# Patient Record
Sex: FEMALE | Race: WHITE | Employment: UNEMPLOYED | URBAN - METROPOLITAN AREA
[De-identification: names, ages, dates, MRNs, and addresses within clinical notes are randomized per-mention and may not be internally consistent; named-entity substitution may affect disease eponyms.]

---

## 2017-01-18 RX ORDER — LORAZEPAM 1 MG/1
1 TABLET ORAL 2 TIMES DAILY
Qty: 30 TAB | Refills: 0 | Status: SHIPPED | OUTPATIENT
Start: 2017-01-18 | End: 2017-03-28 | Stop reason: SDUPTHER

## 2017-03-28 NOTE — TELEPHONE ENCOUNTER
From: Merna Kim  To:  Jessica Kaufman MD  Sent: 3/28/2017 3:23 PM EDT  Subject: Medication Renewal Request    Original authorizing provider: MD Merna Ferris would like a refill of the following medications:  LORazepam (ATIVAN) 1 mg tablet Jessica Kaufman MD]    Preferred pharmacy: [facility]ACY #[id number] - [address] AT [facility]    Comment:

## 2017-03-29 RX ORDER — LORAZEPAM 1 MG/1
1 TABLET ORAL 2 TIMES DAILY
Qty: 30 TAB | Refills: 0 | Status: SHIPPED | OUTPATIENT
Start: 2017-03-29 | End: 2017-06-13 | Stop reason: SDUPTHER

## 2017-06-14 NOTE — TELEPHONE ENCOUNTER
From: Fiona Rogers  To:  Rosette Dalal MD  Sent: 6/13/2017 7:13 PM EDT  Subject: Medication Renewal Request    Original authorizing provider: MD Fiona Mauricio would like a refill of the following medications:  LORazepam (ATIVAN) 1 mg tablet Rosette Dalal MD]    Preferred pharmacy: [address], [address]     Comment:

## 2017-06-15 RX ORDER — LORAZEPAM 1 MG/1
1 TABLET ORAL 2 TIMES DAILY
Qty: 30 TAB | Refills: 0 | Status: SHIPPED | OUTPATIENT
Start: 2017-06-15 | End: 2017-06-19 | Stop reason: SDUPTHER

## 2017-06-19 ENCOUNTER — HOSPITAL ENCOUNTER (OUTPATIENT)
Dept: MAMMOGRAPHY | Age: 42
Discharge: HOME | End: 2017-06-19
Attending: INTERNAL MEDICINE
Payer: COMMERCIAL

## 2017-06-19 ENCOUNTER — TELEPHONE (OUTPATIENT)
Dept: INTERNAL MEDICINE CLINIC | Facility: CLINIC | Age: 42
End: 2017-06-19

## 2017-06-19 DIAGNOSIS — Z12.31 ENCOUNTER FOR MAMMOGRAM TO ESTABLISH BASELINE MAMMOGRAM: ICD-10-CM

## 2017-06-19 PROCEDURE — 77067 SCR MAMMO BI INCL CAD: CPT

## 2017-06-19 RX ORDER — LORAZEPAM 1 MG/1
1 TABLET ORAL 2 TIMES DAILY
Qty: 30 TAB | Refills: 0 | Status: SHIPPED | OUTPATIENT
Start: 2017-06-19 | End: 2017-08-28 | Stop reason: SDUPTHER

## 2017-06-19 NOTE — TELEPHONE ENCOUNTER
Pt is here to  her Rx but Dr. Lila Thomason is not yet here to sign the previously approved Rx. Wrote new Rx.  Lattie Collet will find and shred the not yet signed copy from Dr. Lila Thomason.

## 2017-06-22 ENCOUNTER — HOSPITAL ENCOUNTER (OUTPATIENT)
Dept: MAMMOGRAPHY | Age: 42
Discharge: HOME | End: 2017-06-22
Attending: INTERNAL MEDICINE
Payer: COMMERCIAL

## 2017-06-22 ENCOUNTER — HOSPITAL ENCOUNTER (OUTPATIENT)
Dept: ULTRASOUND IMAGING | Age: 42
Discharge: HOME | End: 2017-06-22
Attending: INTERNAL MEDICINE
Payer: COMMERCIAL

## 2017-06-22 DIAGNOSIS — R92.8 ABNORMAL MAMMOGRAM OF LEFT BREAST: ICD-10-CM

## 2017-06-22 PROCEDURE — 77065 DX MAMMO INCL CAD UNI: CPT

## 2017-06-22 PROCEDURE — 76642 ULTRASOUND BREAST LIMITED: CPT

## 2017-08-29 NOTE — TELEPHONE ENCOUNTER
From: Kobi Schwartz  To: Joann Ma PA-C  Sent: 8/28/2017 10:23 PM EDT  Subject: Medication Renewal Request    Original authorizing provider: SHUN Díaz would like a refill of the following medications:  LORazepam (ATIVAN) 1 mg tablet Joann Ma PA-C]    Preferred pharmacy: [address]     Comment:

## 2017-08-30 RX ORDER — LORAZEPAM 1 MG/1
1 TABLET ORAL 2 TIMES DAILY
Qty: 30 TAB | Refills: 0 | Status: SHIPPED | OUTPATIENT
Start: 2017-08-30 | End: 2017-11-29 | Stop reason: SDUPTHER

## 2017-09-01 ENCOUNTER — OFFICE VISIT (OUTPATIENT)
Dept: INTERNAL MEDICINE CLINIC | Facility: CLINIC | Age: 42
End: 2017-09-01

## 2017-09-01 VITALS
RESPIRATION RATE: 18 BRPM | HEART RATE: 80 BPM | TEMPERATURE: 97 F | HEIGHT: 69 IN | WEIGHT: 188 LBS | BODY MASS INDEX: 27.85 KG/M2 | DIASTOLIC BLOOD PRESSURE: 96 MMHG | SYSTOLIC BLOOD PRESSURE: 126 MMHG

## 2017-09-01 DIAGNOSIS — F41.9 ANXIETY AND DEPRESSION: ICD-10-CM

## 2017-09-01 DIAGNOSIS — F32.A ANXIETY AND DEPRESSION: ICD-10-CM

## 2017-09-01 DIAGNOSIS — Z23 ENCOUNTER FOR IMMUNIZATION: ICD-10-CM

## 2017-09-01 DIAGNOSIS — R60.9 PERIPHERAL EDEMA: ICD-10-CM

## 2017-09-01 DIAGNOSIS — Z00.01 ENCOUNTER FOR GENERAL ADULT MEDICAL EXAMINATION WITH ABNORMAL FINDINGS: Primary | ICD-10-CM

## 2017-09-01 DIAGNOSIS — F43.10 PTSD (POST-TRAUMATIC STRESS DISORDER): ICD-10-CM

## 2017-09-01 DIAGNOSIS — Z87.898 HISTORY OF SYNCOPE: ICD-10-CM

## 2017-09-01 DIAGNOSIS — E55.9 VITAMIN D DEFICIENCY: ICD-10-CM

## 2017-09-01 RX ORDER — HYDROCHLOROTHIAZIDE 12.5 MG/1
12.5 TABLET ORAL DAILY
Qty: 30 TAB | Refills: 0 | Status: SHIPPED | OUTPATIENT
Start: 2017-09-01 | End: 2017-09-30 | Stop reason: SDUPTHER

## 2017-09-01 RX ORDER — SERTRALINE HYDROCHLORIDE 50 MG/1
50 TABLET, FILM COATED ORAL DAILY
Qty: 30 TAB | Refills: 2 | Status: SHIPPED | OUTPATIENT
Start: 2017-09-01 | End: 2017-09-18 | Stop reason: DRUGHIGH

## 2017-09-01 NOTE — PROGRESS NOTES
Subjective:      Lary Eddy is a 40 y.o. female who presents today for CPE. She has a history of polyps and IBS-D, she had a colonoscopy recently due to persistent diarrhea. IBS: she has done a food journal, dietary changes and elimination diets but statesit does not help. She believes her stress/anxiety may be contributing to her symptoms. She states she is frustrated because she has been told she has IBS but has not been given therapy to treat it. Anxiety/Depression: She states she is having trouble sleeping, trouble with concentration, trouble speaking, confusion, panic attacks. She has a history of PTSD. She states she gets brain zaps with buspirone    Syncope: this week she states she was having a panic attack and passed out, she did not fall because she was already sitting but came to slumped over in her chair. She felt the same sensation begin to happen today and went to see a nurse at her work, she had her BP checked and it was 130s/high 90s. She has not been diagnosed with hypertension but states she had postural hypotension as a adolescent, she was also diagnosed with MVP. She has had recent peripheral lower extremity edema to the point where her sock \"cut into\" her leg. She states she is chronically dehydrated. Health Maintenance  Immunizations:    Influenza: she will plan on getting it this fall. Tetanus: up to date. Cancer screening:    Cervical: complete hysterctomy, reviewed guidelines. Breast: she will be going back to get additional imaging in 6 months, reviewed guidelines    Patient Care Team:  Pao Cox MD as PCP - General (Internal Medicine)  Vicki Granado MD as Obstetrician (Obstetrics & Gynecology)  Shanna Merrill MD (Gynecology)       The following sections were reviewed & updated as appropriate: PMH, PSH, FH, and SH.     Patient Active Problem List   Diagnosis Code    Fibroids D25.9    Cellulitis of left upper extremity L03.114    Arthralgia M25.50    Positive MAURICIO (antinuclear antibody) R76.8    Hyperlipidemia E78.5    Vitamin D deficiency E55.9    Anxiety F41.9    DDD (degenerative disc disease), lumbar M51.36    Colitis K52.9    Mild intermittent asthma without complication W50.81    Family history of exposure to Agent Orange Z84.89          Prior to Admission medications    Medication Sig Start Date End Date Taking? Authorizing Provider   LORazepam (ATIVAN) 1 mg tablet Take 1 Tab by mouth two (2) times a day. Max Daily Amount: 2 mg. Prn anxiety 8/30/17  Yes Ar Domínguez MD      Allergies   Allergen Reactions    Latex Rash    Sulfa (Sulfonamide Antibiotics) Anaphylaxis    Adhesive Tape-Silicones Rash    Morphine Rash    Neosporin [Benz[place] Chloride] Rash      Family History   Problem Relation Age of Onset    Cancer Mother     Heart Disease Father     Depression Father     Cancer Sister     Alzheimer Maternal Grandmother     Diabetes Maternal Grandfather     Heart Surgery Paternal Grandmother     Heart Surgery Paternal Grandfather      Social History   Substance Use Topics    Smoking status: Former Smoker     Packs/day: 1.00     Years: 10.00     Quit date: 4/7/2004    Smokeless tobacco: Never Used    Alcohol use No        Review of Systems    A comprehensive review of systems was negative except for: Neurological: positive for syncope  Behvioral/Psych: positive for anxiety and depression     Objective:     Visit Vitals    BP (!) 126/96    Pulse 80    Temp 97 °F (36.1 °C) (Oral)    Resp 18    Ht 5' 9\" (1.753 m)    Wt 188 lb (85.3 kg)    LMP 03/18/2016    BMI 27.76 kg/m2     General:  Alert, cooperative, no distress, appears stated age. Head:  Normocephalic, without obvious abnormality, atraumatic. Eyes:  Conjunctivae/corneas clear. PERRL, EOMs intact. Ears:  Normal TMs and external ear canals both ears. Nose: Nares normal. Septum midline. Mucosa normal. No drainage or sinus tenderness.    Throat: Lips, mucosa, and tongue normal. Teeth and gums normal.   Neck: Supple, symmetrical, trachea midline, no adenopathy, thyroid: no enlargement/tenderness/nodules, no carotid bruit and no JVD. Back:   Symmetric, no curvature. ROM normal. No CVA tenderness. Lungs:   Clear to auscultation bilaterally. Chest wall:  No tenderness or deformity. Heart:  Regular rate and rhythm, S1, S2 normal, no murmur, click, rub or gallop. Abdomen:   Soft, non-tender. Bowel sounds normal. No masses,  No organomegaly. Extremities: Extremities normal, atraumatic, no cyanosis or edema. Pulses: 2+ and symmetric all extremities. Skin: Skin color, texture, turgor normal. No rashes or lesions. Lymph nodes: Cervical, supraclavicular, and axillary nodes normal.   Neurologic: CNII-XII intact. Normal strength, sensation and reflexes throughout. Nursing note and vitals reviewed  Assessment/Plan:       ICD-10-CM ICD-9-CM    1. Encounter for general adult medical examination with abnormal findings Z00.01 V70.0 CBC WITH AUTOMATED DIFF      LIPID PANEL      METABOLIC PANEL, COMPREHENSIVE      TSH 3RD GENERATION      VITAMIN D, 25 HYDROXY   2. Encounter for immunization Z23 V03.89 PNEUMOCOCCAL POLYSACCHARIDE VACCINE, 23-VALENT, ADULT OR IMMUNOSUPPRESSED PT DOSE,   3. History of syncope Z87.898 V15.89 AMB POC EKG ROUTINE W/ 12 LEADS, INTER & REP      REFERRAL TO NEUROLOGY      REFERRAL TO CARDIOLOGY   4. Anxiety and depression F41.9 300.00 sertraline (ZOLOFT) 50 mg tablet    F32.9 311    5. PTSD (post-traumatic stress disorder) F43.10 309.81 REFERRAL TO BEHAVIORAL HEALTH   6. Vitamin D deficiency E55.9 268.9    7. Peripheral edema R60.9 782.3 hydroCHLOROthiazide (HYDRODIURIL) 12.5 mg tablet     Concern for TIA, referral to neurology placed.  Blood pressure has been erratic and now with new onset peripheral edema, cardiology referral placed, EKG done today and is normal. Low dose hydrochlorothiazide started, she will check pressure over the weekend and discontinue if it is low. Info given about bentyl, she will review and let me know if she wants to trial it. Behavioral therapist referral placed also. Follow-up Disposition:  Return in about 4 weeks (around 9/29/2017) for Blood pressure check, Anxiety/Depression. Advised her to call back or return to office if symptoms worsen/change/persist.  Discussed expected course/resolution/complications of diagnosis in detail with patient. Medication risks/benefits/costs/interactions/alternatives discussed with patient. She was given an after visit summary which includes diagnoses, current medications, & vitals. She expressed understanding with the diagnosis and plan.

## 2017-09-01 NOTE — PATIENT INSTRUCTIONS
Dicyclomine (By mouth)   Dicyclomine (dye-SYE-kloe-meen)  Treats irritable bowel syndrome. Brand Name(s): Bentyl   There may be other brand names for this medicine. When This Medicine Should Not Be Used: You should not use this medicine if you have had an allergic reaction to dicyclomine. Do not use this medicine if you have glaucoma, myasthenia gravis, or trouble urinating because of a blockage (such as an enlarged prostate). Make sure your doctor knows about all digestion problems you have, including reflux esophagitis (GERD), or severe ulcerative colitis. You should not use this medicine if you are breast feeding. This medicine should not be given to infants less than 6 months old. How to Use This Medicine:   Capsule, Tablet, Long Acting Tablet, Liquid  · Take your medicine as directed. Your dose may need to be changed several times to find what works best for you. · Swallow the extended-release tablet whole. Do not break, crush or chew. · Measure the oral liquid medicine with a marked measuring spoon, oral syringe, or medicine cup. If a dose is missed:   · Take a dose as soon as you remember. If it is almost time for your next dose, wait until then and take a regular dose. Do not take extra medicine to make up for a missed dose. How to Store and Dispose of This Medicine:   · Store the medicine in a closed container at room temperature, away from heat, moisture, and direct light. Do not freeze the oral liquid. · Ask your pharmacist, doctor, or health caregiver about the best way to dispose of any outdated medicine or medicine no longer needed. · Keep all medicine out of the reach of children. Never share your medicine with anyone. Drugs and Foods to Avoid:   Ask your doctor or pharmacist before using any other medicine, including over-the-counter medicines, vitamins, and herbal products.   · Make sure your doctor knows if you are using amantadine (Symmetrel®), digoxin (Lanoxin®), or a belladonna medicine such as atropine, hyoscyamine, scopolamine, Anaspaz®, Arco-Lase® Plus, or Donnatal®. Tell your doctor if you are using an MAO inhibitor such as Eldepryl®, Marplan®, [place], or Parnate®. · Tell your doctor if you are also using narcotic pain medicine, medicine for heart rhythm problems, an antihistamine, medicine to treat depression, phenothiazines (medicines to treat certain mental problems or severe nausea or vomiting), or a steroid medicine. Meperidine (Demerol®) is a narcotic pain medicine. Some medicines for heart rhythm problems are disopyramide, procainamide, quinidine, Cardioquin®, Norpace®, Procanbid®, or Natalie Lyme. Diphenhydramine (Benadryl®) is an antihistamine. Some phenothiazine medicines are Compazine®, Mellaril®, Phenergan®, Serentil®, Tacaryl®, Thorazine®, or Trilafon®. Some medicines to treat depression are amitriptyline, nortriptyline, Norpramin®, or Vivactil®. Cortisone and prednisone are steroid medicines. · You should not use dicyclomine within 2 to 3 hours of taking antacids (Maalox®, Mylanta®) or medicine to stop diarrhea. Warnings While Using This Medicine:   · Make sure your doctor knows if you are pregnant, if you have liver disease, kidney disease, or overactive thyroid. Tell your doctor about any heart or blood vessel problems you have, including heart rhythm problems, congestive heart failure, or high blood pressure. Make sure your doctor knows if you have ongoing diarrhea, or an ileostomy or colostomy. Tell your doctor if you have autonomic neuropathy (a nerve problem), or a hiatal hernia (problems with your esophagus). · This medicine may make you sweat less. Your body could get too hot if you do not sweat enough. Stay out of hot places. Try to stay indoors or somewhere cool during hot weather. If you have a fever, call your doctor for advice. If your body gets too hot, you might feel dizzy, weak, tired, or confused. You might have an upset stomach or vomit.  Call your doctor if you are too hot and cannot cool down. · This medicine may make you drowsy. Avoid driving, using machines, or doing anything else that could be dangerous if you are not alert. · Your eyes may be more sensitive to bright light while you are using this medicine. You may want to wear sunglasses in bright sunlight. Possible Side Effects While Using This Medicine:   Call your doctor right away if you notice any of these side effects:  · Allergic reaction: Itching or hives, swelling in your face or hands, swelling or tingling in your mouth or throat, chest tightness, trouble breathing  · Fast or uneven heartbeat. · Restlessness, agitation, or confusion. · Severe dizziness or light-headedness. If you notice these less serious side effects, talk with your doctor:   · Decrease in how much or how often you urinate. · Drowsiness or weakness. · Dry mouth. · Nausea, vomiting, constipation, or stomach pain. · Trouble focusing or other vision changes. If you notice other side effects that you think are caused by this medicine, tell your doctor. Call your doctor for medical advice about side effects. You may report side effects to FDA at 4-832-FDA-9839  © 2017 [address] Information is for End User's use only and may not be sold, redistributed or otherwise used for commercial purposes. The above information is an  only. It is not intended as medical advice for individual conditions or treatments. Talk to your doctor, nurse or pharmacist before following any medical regimen to see if it is safe and effective for you. Dicyclomine (By mouth)   Dicyclomine (dye-SYE-kloe-meen)  Treats irritable bowel syndrome. Brand Name(s): Bentyl   There may be other brand names for this medicine. When This Medicine Should Not Be Used: You should not use this medicine if you have had an allergic reaction to dicyclomine.  Do not use this medicine if you have glaucoma, myasthenia gravis, or trouble urinating because of a blockage (such as an enlarged prostate). Make sure your doctor knows about all digestion problems you have, including reflux esophagitis (GERD), or severe ulcerative colitis. You should not use this medicine if you are breast feeding. This medicine should not be given to infants less than 6 months old. How to Use This Medicine:   Capsule, Tablet, Long Acting Tablet, Liquid  · Take your medicine as directed. Your dose may need to be changed several times to find what works best for you. · Swallow the extended-release tablet whole. Do not break, crush or chew. · Measure the oral liquid medicine with a marked measuring spoon, oral syringe, or medicine cup. If a dose is missed:   · Take a dose as soon as you remember. If it is almost time for your next dose, wait until then and take a regular dose. Do not take extra medicine to make up for a missed dose. How to Store and Dispose of This Medicine:   · Store the medicine in a closed container at room temperature, away from heat, moisture, and direct light. Do not freeze the oral liquid. · Ask your pharmacist, doctor, or health caregiver about the best way to dispose of any outdated medicine or medicine no longer needed. · Keep all medicine out of the reach of children. Never share your medicine with anyone. Drugs and Foods to Avoid:   Ask your doctor or pharmacist before using any other medicine, including over-the-counter medicines, vitamins, and herbal products. · Make sure your doctor knows if you are using amantadine (Symmetrel®), digoxin (Lanoxin®), or a belladonna medicine such as atropine, hyoscyamine, scopolamine, Anaspaz®, Arco-Lase® Plus, or Donnatal®. Tell your doctor if you are using an MAO inhibitor such as Eldepryl®, Marplan®, [place], or Parnate®.   · Tell your doctor if you are also using narcotic pain medicine, medicine for heart rhythm problems, an antihistamine, medicine to treat depression, phenothiazines (medicines to treat certain mental problems or severe nausea or vomiting), or a steroid medicine. Meperidine (Demerol®) is a narcotic pain medicine. Some medicines for heart rhythm problems are disopyramide, procainamide, quinidine, Cardioquin®, Norpace®, Procanbid®, or Starlet Lev. Diphenhydramine (Benadryl®) is an antihistamine. Some phenothiazine medicines are Compazine®, Mellaril®, Phenergan®, Serentil®, Tacaryl®, Thorazine®, or Trilafon®. Some medicines to treat depression are amitriptyline, nortriptyline, Norpramin®, or Vivactil®. Cortisone and prednisone are steroid medicines. · You should not use dicyclomine within 2 to 3 hours of taking antacids (Maalox®, Mylanta®) or medicine to stop diarrhea. Warnings While Using This Medicine:   · Make sure your doctor knows if you are pregnant, if you have liver disease, kidney disease, or overactive thyroid. Tell your doctor about any heart or blood vessel problems you have, including heart rhythm problems, congestive heart failure, or high blood pressure. Make sure your doctor knows if you have ongoing diarrhea, or an ileostomy or colostomy. Tell your doctor if you have autonomic neuropathy (a nerve problem), or a hiatal hernia (problems with your esophagus). · This medicine may make you sweat less. Your body could get too hot if you do not sweat enough. Stay out of hot places. Try to stay indoors or somewhere cool during hot weather. If you have a fever, call your doctor for advice. If your body gets too hot, you might feel dizzy, weak, tired, or confused. You might have an upset stomach or vomit. Call your doctor if you are too hot and cannot cool down. · This medicine may make you drowsy. Avoid driving, using machines, or doing anything else that could be dangerous if you are not alert. · Your eyes may be more sensitive to bright light while you are using this medicine. You may want to wear sunglasses in bright sunlight.   Possible Side Effects While Using This Medicine:   Call your doctor right away if you notice any of these side effects:  · Allergic reaction: Itching or hives, swelling in your face or hands, swelling or tingling in your mouth or throat, chest tightness, trouble breathing  · Fast or uneven heartbeat. · Restlessness, agitation, or confusion. · Severe dizziness or light-headedness. If you notice these less serious side effects, talk with your doctor:   · Decrease in how much or how often you urinate. · Drowsiness or weakness. · Dry mouth. · Nausea, vomiting, constipation, or stomach pain. · Trouble focusing or other vision changes. If you notice other side effects that you think are caused by this medicine, tell your doctor. Call your doctor for medical advice about side effects. You may report side effects to FDA at 9-115-FDA-0268  © 2017 [facility] Information is for End User's use only and may not be sold, redistributed or otherwise used for commercial purposes. The above information is an  only. It is not intended as medical advice for individual conditions or treatments. Talk to your doctor, nurse or pharmacist before following any medical regimen to see if it is safe and effective for you. Irritable Bowel Syndrome: Care Instructions  Your Care Instructions  Irritable bowel syndrome, or IBS, is a problem with the intestines that causes belly pain, bloating, gas, constipation, and diarrhea. The cause of IBS is not well known. IBS can last for many years, but it does not get worse over time or lead to serious disease. Most people can control their symptoms by changing their diet and reducing stress. Follow-up care is a key part of your treatment and safety. Be sure to make and go to all appointments, and call your doctor if you are having problems. It's also a good idea to know your test results and keep a list of the medicines you take. How can you care for yourself at home?   · For constipation:  ¨ Include fruits, vegetables, beans, and whole grains in your diet each day. These foods are high in fiber. ¨ Drink plenty of fluids, enough so that your urine is light yellow or clear like water. If you have kidney, heart, or liver disease and have to limit fluids, talk with your doctor before you increase the amount of fluids you drink. ¨ Get some exercise every day. Build up slowly to 30 to 60 minutes a day on 5 or more days of the week. ¨ Take a fiber supplement, such as Citrucel or Metamucil, every day if needed. Read and follow all instructions on the label. ¨ Schedule time each day for a bowel movement. Having a daily routine may help. Take your time and do not strain when having a bowel movement. · If you often have diarrhea, limit foods and drinks that make it worse. These are different for each person but may include caffeine (found in coffee, tea, chocolate, and cola drinks), alcohol, fatty foods, gas-producing foods (such as beans, cabbage, and broccoli), some dairy products, and spicy foods. Do not eat candy or gum that contains sorbitol. · Keep a daily diary of what you eat and what symptoms you have. This may help find foods that cause you problems. · Eat slowly. Try to make mealtime relaxing. · Find ways to reduce stress. · Get at least 30 minutes of exercise on most days of the week. Exercise can help reduce tension and prevent constipation. Walking is a good choice. You also may want to do other activities, such as running, swimming, cycling, or playing tennis or team sports. When should you call for help? Call your doctor now or seek immediate medical care if:  · Your pain is different than usual or occurs with fever. · You lose weight without trying, or you lose your appetite and you do not know why. · Your symptoms often wake you from sleep. · Your stools are black and tarlike or have streaks of blood.   Watch closely for changes in your health, and be sure to contact your doctor if:  · Your IBS symptoms get worse or begin to disrupt your day-to-day life. · You become more tired than usual.  · Your home treatment stops working. Where can you learn more? Go to http://demarco-max.info/. Enter S435 in the search box to learn more about \"Irritable Bowel Syndrome: Care Instructions. \"  Current as of: August 9, 2016  Content Version: 11.3  © 8691-9102 [employer]. Care instructions adapted under license by [employer] (which disclaims liability or warranty for this information). If you have questions about a medical condition or this instruction, always ask your healthcare professional. [place] [address] any warranty or liability for your use of this information. Hydrochlorothiazide (By mouth)   Hydrochlorothiazide (genesis-droe-klor-hb-KJTM-g-zide)  Treats high blood pressure and fluid retention (edema). This medicine is a diuretic (water pill). Brand Name(s): Microzide   There may be other brand names for this medicine. When This Medicine Should Not Be Used: This medicine is not right for everyone. Do not use this medicine if you had an allergic reaction to hydrochlorothiazide or a sulfa drug. How to Use This Medicine:   Capsule, Liquid, Tablet  · Take your medicine as directed. Your dose may need to be changed several times to find what works best for you. · Measure the oral liquid medicine with a marked measuring spoon, oral syringe, or medicine cup. · Missed dose: Take a dose as soon as you remember. If it is almost time for your next dose, wait until then and take a regular dose. Do not take extra medicine to make up for a missed dose. · Store the medicine in a closed container at room temperature, away from heat, moisture, and direct light. Drugs and Foods to Avoid:   Ask your doctor or pharmacist before using any other medicine, including over-the-counter medicines, vitamins, and herbal products.   · Some medicines and foods can affect how hydrochlorothiazide works. Tell your doctor if you are also using any of the following:   ¨ Cholestyramine, colestipol, digoxin, lithium, insulin or other diabetes medicine  ¨ An NSAID pain or arthritis medicine (such as aspirin, diclofenac, ibuprofen, naproxen, celecoxib), or a steroid medicine (such as hydrocortisone, methylprednisolone, prednisone, prednisolone, dexamethasone)  Warnings While Using This Medicine:   · Tell your doctor if you are pregnant or breastfeeding, or if you have kidney disease, liver disease, heart disease or heart failure, high cholesterol, diabetes, gout, trouble urinating, or lupus. · This medicine may cause the following problems:  ¨ Glaucoma and other vision problems  ¨ Acute gout  ¨ Damage to the parathyroid gland  ¨ Low or high levels of minerals in your blood (including potassium and sodium)  · This medicine may make you dizzy. Do not drive or do anything else that could be dangerous until you know how this medicine affects you. · This medicine could lower your blood pressure too much, especially when you first use it or if you are dehydrated. Stand or sit up slowly if you feel lightheaded or dizzy. Alcohol may make this problem worse. · Tell any doctor or dentist who treats you that you are using this medicine. · Your doctor will check your progress and the effects of this medicine at regular visits. Keep all appointments. · Keep all medicine out of the reach of children. Never share your medicine with anyone.   Possible Side Effects While Using This Medicine:   Call your doctor right away if you notice any of these side effects:  · Allergic reaction: Itching or hives, swelling in your face or hands, swelling or tingling in your mouth or throat, chest tightness, trouble breathing  · Blistering, peeling, or red skin rash  · Confusion, weakness, and muscle twitching  · Dry mouth, increased thirst, muscle cramps, nausea or vomiting, uneven heartbeat  · Lightheadedness, dizziness, or fainting  · Nausea, vomiting, unusual tiredness or weakness, muscle cramps, confusion  · Trouble seeing, eye pain, blurred vision or other vision changes  If you notice these less serious side effects, talk with your doctor:   · Headache  · Mild diarrhea, constipation, nausea  If you notice other side effects that you think are caused by this medicine, tell your doctor. Call your doctor for medical advice about side effects. You may report side effects to FDA at 9-793-ZIN-2026  © 2017 [address] Information is for End User's use only and may not be sold, redistributed or otherwise used for commercial purposes. The above information is an  only. It is not intended as medical advice for individual conditions or treatments. Talk to your doctor, nurse or pharmacist before following any medical regimen to see if it is safe and effective for you. Elevated Blood Pressure: Care Instructions  Your Care Instructions    Blood pressure is a measure of how hard the blood pushes against the walls of your arteries. It's normal for blood pressure to go up and down throughout the day. But if it stays up over time, you have high blood pressure. Two numbers tell you your blood pressure. The first number is the systolic pressure. It shows how hard the blood pushes when your heart is pumping. The second number is the diastolic pressure. It shows how hard the blood pushes between heartbeats, when your heart is relaxed and filling with blood. An ideal blood pressure in adults is less than 120/80 (say \"120 over 80\"). High blood pressure is 140/90 or higher. You have high blood pressure if your top number is 140 or higher or your bottom number is 90 or higher, or both. The main test for high blood pressure is simple, fast, and painless. To diagnose high blood pressure, your doctor will test your blood pressure at different times.  After testing your blood pressure, your doctor may ask you to test it again when you are home. If you are diagnosed with high blood pressure, you can work with your doctor to make a long-term plan to manage it. Follow-up care is a key part of your treatment and safety. Be sure to make and go to all appointments, and call your doctor if you are having problems. It's also a good idea to know your test results and keep a list of the medicines you take. How can you care for yourself at home? · Do not smoke. Smoking increases your risk for heart attack and stroke. If you need help quitting, talk to your doctor about stop-smoking programs and medicines. These can increase your chances of quitting for good. · Stay at a healthy weight. · Try to limit how much sodium you eat to less than 2,300 milligrams (mg) a day. Your doctor may ask you to try to eat less than 1,500 mg a day. · Be physically active. Get at least 30 minutes of exercise on most days of the week. Walking is a good choice. You also may want to do other activities, such as running, swimming, cycling, or playing tennis or team sports. · Avoid or limit alcohol. Talk to your doctor about whether you can drink any alcohol. · Eat plenty of fruits, vegetables, and low-fat dairy products. Eat less saturated and total fats. · Learn how to check your blood pressure at home. When should you call for help? Call your doctor now or seek immediate medical care if:  · Your blood pressure is much higher than normal (such as 180/110 or higher). · You think high blood pressure is causing symptoms such as:  ¨ Severe headache. ¨ Blurry vision. Watch closely for changes in your health, and be sure to contact your doctor if:  · You do not get better as expected. Where can you learn more? Go to http://demarco-max.info/. Enter R870 in the search box to learn more about \"Elevated Blood Pressure: Care Instructions. \"  Current as of: April 3, 2017  Content Version: 11.3  © 3813-3241 [employer]. Care instructions adapted under license by [employer] (which disclaims liability or warranty for this information). If you have questions about a medical condition or this instruction, always ask your healthcare professional. [place] [address] any warranty or liability for your use of this information.

## 2017-09-01 NOTE — PROGRESS NOTES
Chief Complaint   Patient presents with    Physical    Medication Evaluation     Ativan       1. Have you been to the ER, urgent care clinic since your last visit? Hospitalized since your last visit? Yes, [facility] 2 weeks ago for back pain    2. Have you seen or consulted any other health care providers outside of the [address] [place] [address] since your last visit? Include any pap smears or colon screening. Sandrine Hinton  is a 40 y.o.  female  who present for Pneumovax 23 immunizations/injections. He/she denies any symptoms , reactions or allergies that would exclude them from being immunized today. Risks and adverse reactions were discussed and the VIS was given if applicable to them. All questions were addressed. He/She was observed for 5 min post injection. There were no reactions observed.     Michelle Cuenca LPN

## 2017-09-01 NOTE — MR AVS SNAPSHOT
Visit Information Date & Time Provider Department Dept. Phone Encounter #  
 9/1/2017  3:30 PM Teodoro Thayer, [address] Internal Medicine [phone] [id number] Upcoming Health Maintenance Date Due Pneumococcal 19-64 Medium Risk (1 of 1 - PPSV23) 5/18/1995 INFLUENZA AGE 9 TO ADULT 8/1/2017 PAP AKA CERVICAL CYTOLOGY 2/22/2019 DTaP/Tdap/Td series (2 - Td) 2/22/2026 Allergies as of 9/1/2017  Review Complete On: 9/1/2017 By: Teodoro Thayer NP Severity Noted Reaction Type Reactions Latex  02/03/2016    Rash  
 Sulfa (Sulfonamide Antibiotics) High 02/03/2016    Anaphylaxis Adhesive Tape-silicones  19/10/3341    Rash Morphine  02/03/2016    Rash Neosporin [Benzalkonium Chloride]  08/18/2016    Rash Current Immunizations  Reviewed on 2/23/2016 Name Date Influenza Vaccine (Quad) PF 2/3/2016 Pneumococcal Polysaccharide (PPSV-23)  Incomplete Tdap 2/22/2016 Not reviewed this visit You Were Diagnosed With   
  
 Codes Comments Encounter for general adult medical examination with abnormal findings    -  Primary ICD-10-CM: Z00.01 
ICD-9-CM: V70.0 Encounter for immunization     ICD-10-CM: F02 ICD-9-CM: V03.89 History of syncope     ICD-10-CM: Z87.898 ICD-9-CM: V15.89 Anxiety and depression     ICD-10-CM: F41.9, F32.9 ICD-9-CM: 300.00, 311 PTSD (post-traumatic stress disorder)     ICD-10-CM: F43.10 ICD-9-CM: 309.81 Vitamin D deficiency     ICD-10-CM: E55.9 ICD-9-CM: 268.9 Peripheral edema     ICD-10-CM: R60.9 ICD-9-CM: 026. 3 Vitals BP Pulse Temp Resp Height(growth percentile) Weight(growth percentile) (!) 126/96 80 97 °F (36.1 °C) (Oral) 18 5' 9\" (1.753 m) 188 lb (85.3 kg) LMP BMI OB Status Smoking Status 03/18/2016 27.76 kg/m2 Hysterectomy Former Smoker Vitals History BMI and BSA Data  Body Mass Index Body Surface Area  
 27.76 kg/m 2 2.04 m 2  
  
  
 Preferred Pharmacy Pharmacy Name Phone [facility]/PHARMACY #[id number] AT [address] [place] [address] [phone] Your Updated Medication List  
  
   
This list is accurate as of: 9/1/17  4:26 PM.  Always use your most recent med list.  
  
  
  
  
 hydroCHLOROthiazide 12.5 mg tablet Commonly known as:  HYDRODIURIL Take 1 Tab by mouth daily. LORazepam 1 mg tablet Commonly known as:  ATIVAN Take 1 Tab by mouth two (2) times a day. Max Daily Amount: 2 mg. Prn anxiety  
  
 sertraline 50 mg tablet Commonly known as:  ZOLOFT Take 1 Tab by mouth daily. Prescriptions Sent to Pharmacy Refills  
 sertraline (ZOLOFT) 50 mg tablet 2 Sig: Take 1 Tab by mouth daily. Class: Normal  
 Pharmacy: [facility]/pharmacy #[id number] AT [address] [place] [address] Ph #: [phone] Route: Oral  
 hydroCHLOROthiazide (HYDRODIURIL) 12.5 mg tablet 0 Sig: Take 1 Tab by mouth daily. Class: Normal  
 Pharmacy: [facility]/pharmacy #[id number] AT [address] [place] [address] Ph #: [phone] Route: Oral  
  
We Performed the Following AMB POC EKG ROUTINE W/ 12 LEADS, INTER & REP [50403 CPT(R)] CBC WITH AUTOMATED DIFF [45235 CPT(R)] LIPID PANEL [62422 CPT(R)] METABOLIC PANEL, COMPREHENSIVE [45717 CPT(R)] PNEUMOCOCCAL POLYSACCHARIDE VACCINE, 23-VALENT, ADULT OR IMMUNOSUPPRESSED PT DOSE, [98065 CPT(R)] REFERRAL TO BEHAVIORAL HEALTH [REF8 Custom] REFERRAL TO CARDIOLOGY [BZM34 Custom] REFERRAL TO NEUROLOGY [KEW62 Custom] TSH 3RD GENERATION [70543 CPT(R)] VITAMIN D, 25 HYDROXY [id number] CPT(R)] To-Do List   
 12/19/2017 8:30 AM  
  Appointment with Pending sale to [facility] at [facility] ([phone]) Shower or bathe using soap and water.   Do not use deodorant, powder, perfumes, or lotion the day of your exam.  If your prior mammograms were not performed at [facility] 6 please bring films with you or forward prior images 2 days before your procedure. If patient is not a callback diagnostic, the patient must have an order/script from the physician for the diagnostic. Please bring it on the day of the mammogram or have it faxed to the department. [facility]  [phone] [facility] [phone] SANDR[facility]  [phone] [facility] [phone] [facility] [phone] [facility] [phone] Chele Alegria [phone]  
  
 12/19/2017 9:00 AM  
  Appointment with [address] [place] [address] at [facility] ([phone]) Shower or bathe using soap and water. Do not use deodorant, powder, perfumes, or lotion. If your prior films were not performed at a local [facility] facility please bring or forward prior images 2 days before procedure. Referral Information Referral ID Referred By Referred To  
  
 [id number] SKIFF, Jack Quail, [facility] [address] [facility], [address] Phone: [phone] Fax: [phone] Visits Status Start Date End Date 1 New Request 9/1/17 9/1/18 If your referral has a status of pending review or denied, additional information will be sent to support the outcome of this decision. Referral ID Referred By Referred To  
 [id number] SKIFF, [address], DO  
   [address] [facility] [address] [facility] Neurology [facility], [address] Phone: [phone] Fax: [phone] Visits Status Start Date End Date 1 New Request 9/1/17 9/1/18 If your referral has a status of pending review or denied, additional information will be sent to support the outcome of this decision. Referral ID Referred By Referred To  
 [id number] SKIFF, Patrecia Settles[address] [facility] [address], [address] Phone: [phone] Fax: [phone] Visits Status Start Date End Date 1 New Request 9/1/17 9/1/18 If your referral has a status of pending review or denied, additional information will be sent to support the outcome of this decision. Patient Instructions Dicyclomine (By mouth) Dicyclomine (dye-SYE-kloe-meen) Treats irritable bowel syndrome. Brand Name(s): Bentyl There may be other brand names for this medicine. When This Medicine Should Not Be Used: You should not use this medicine if you have had an allergic reaction to dicyclomine. Do not use this medicine if you have glaucoma, myasthenia gravis, or trouble urinating because of a blockage (such as an enlarged prostate). Make sure your doctor knows about all digestion problems you have, including reflux esophagitis (GERD), or severe ulcerative colitis. You should not use this medicine if you are breast feeding. This medicine should not be given to infants less than 6 months old. How to Use This Medicine:  
Capsule, Tablet, Long Acting Tablet, Liquid · Take your medicine as directed. Your dose may need to be changed several times to find what works best for you. · Swallow the extended-release tablet whole. Do not break, crush or chew. · Measure the oral liquid medicine with a marked measuring spoon, oral syringe, or medicine cup. If a dose is missed: · Take a dose as soon as you remember. If it is almost time for your next dose, wait until then and take a regular dose. Do not take extra medicine to make up for a missed dose. How to Store and Dispose of This Medicine: · Store the medicine in a closed container at room temperature, away from heat, moisture, and direct light. Do not freeze the oral liquid. · Ask your pharmacist, doctor, or health caregiver about the best way to dispose of any outdated medicine or medicine no longer needed. · Keep all medicine out of the reach of children. Never share your medicine with anyone. Drugs and Foods to Avoid: Ask your doctor or pharmacist before using any other medicine, including over-the-counter medicines, vitamins, and herbal products. · Make sure your doctor knows if you are using amantadine (Symmetrel®), digoxin (Lanoxin®), or a belladonna medicine such as atropine, hyoscyamine, scopolamine, Anaspaz®, Arco-Lase® Plus, or Donnatal®. Tell your doctor if you are using an MAO inhibitor such as Eldepryl®, Marplan®, [place], or Parnate®. · Tell your doctor if you are also using narcotic pain medicine, medicine for heart rhythm problems, an antihistamine, medicine to treat depression, phenothiazines (medicines to treat certain mental problems or severe nausea or vomiting), or a steroid medicine. Meperidine (Demerol®) is a narcotic pain medicine. Some medicines for heart rhythm problems are disopyramide, procainamide, quinidine, Cardioquin®, Norpace®, Procanbid®, or Carlos Weller. Diphenhydramine (Benadryl®) is an antihistamine. Some phenothiazine medicines are Compazine®, Mellaril®, Phenergan®, Serentil®, Tacaryl®, Thorazine®, or Trilafon®. Some medicines to treat depression are amitriptyline, nortriptyline, Norpramin®, or Vivactil®. Cortisone and prednisone are steroid medicines. · You should not use dicyclomine within 2 to 3 hours of taking antacids (Maalox®, Mylanta®) or medicine to stop diarrhea. Warnings While Using This Medicine: · Make sure your doctor knows if you are pregnant, if you have liver disease, kidney disease, or overactive thyroid. Tell your doctor about any heart or blood vessel problems you have, including heart rhythm problems, congestive heart failure, or high blood pressure. Make sure your doctor knows if you have ongoing diarrhea, or an ileostomy or colostomy. Tell your doctor if you have autonomic neuropathy (a nerve problem), or a hiatal hernia (problems with your esophagus). · This medicine may make you sweat less. Your body could get too hot if you do not sweat enough. Stay out of hot places.  Try to stay indoors or somewhere cool during hot weather. If you have a fever, call your doctor for advice. If your body gets too hot, you might feel dizzy, weak, tired, or confused. You might have an upset stomach or vomit. Call your doctor if you are too hot and cannot cool down. · This medicine may make you drowsy. Avoid driving, using machines, or doing anything else that could be dangerous if you are not alert. · Your eyes may be more sensitive to bright light while you are using this medicine. You may want to wear sunglasses in bright sunlight. Possible Side Effects While Using This Medicine:  
Call your doctor right away if you notice any of these side effects: · Allergic reaction: Itching or hives, swelling in your face or hands, swelling or tingling in your mouth or throat, chest tightness, trouble breathing · Fast or uneven heartbeat. · Restlessness, agitation, or confusion. · Severe dizziness or light-headedness. If you notice these less serious side effects, talk with your doctor: · Decrease in how much or how often you urinate. · Drowsiness or weakness. · Dry mouth. · Nausea, vomiting, constipation, or stomach pain. · Trouble focusing or other vision changes. If you notice other side effects that you think are caused by this medicine, tell your doctor. Call your doctor for medical advice about side effects. You may report side effects to FDA at 5-596-FDA-0128 © 2017 [facility] Information is for End User's use only and may not be sold, redistributed or otherwise used for commercial purposes. The above information is an  only. It is not intended as medical advice for individual conditions or treatments. Talk to your doctor, nurse or pharmacist before following any medical regimen to see if it is safe and effective for you. Dicyclomine (By mouth) Dicyclomine (dye-SYE-kloe-meen) Treats irritable bowel syndrome. Brand Name(s): Bentyl There may be other brand names for this medicine. When This Medicine Should Not Be Used: You should not use this medicine if you have had an allergic reaction to dicyclomine. Do not use this medicine if you have glaucoma, myasthenia gravis, or trouble urinating because of a blockage (such as an enlarged prostate). Make sure your doctor knows about all digestion problems you have, including reflux esophagitis (GERD), or severe ulcerative colitis. You should not use this medicine if you are breast feeding. This medicine should not be given to infants less than 6 months old. How to Use This Medicine:  
Capsule, Tablet, Long Acting Tablet, Liquid · Take your medicine as directed. Your dose may need to be changed several times to find what works best for you. · Swallow the extended-release tablet whole. Do not break, crush or chew. · Measure the oral liquid medicine with a marked measuring spoon, oral syringe, or medicine cup. If a dose is missed: · Take a dose as soon as you remember. If it is almost time for your next dose, wait until then and take a regular dose. Do not take extra medicine to make up for a missed dose. How to Store and Dispose of This Medicine: · Store the medicine in a closed container at room temperature, away from heat, moisture, and direct light. Do not freeze the oral liquid. · Ask your pharmacist, doctor, or health caregiver about the best way to dispose of any outdated medicine or medicine no longer needed. · Keep all medicine out of the reach of children. Never share your medicine with anyone. Drugs and Foods to Avoid: Ask your doctor or pharmacist before using any other medicine, including over-the-counter medicines, vitamins, and herbal products. · Make sure your doctor knows if you are using amantadine (Symmetrel®), digoxin (Lanoxin®), or a belladonna medicine such as atropine, hyoscyamine, scopolamine, Anaspaz®, Arco-Lase® Plus, or Donnatal®.  Tell your doctor if you are using an MAO inhibitor such as Eldepryl®, Marplan®, [place], or Parnate®. · Tell your doctor if you are also using narcotic pain medicine, medicine for heart rhythm problems, an antihistamine, medicine to treat depression, phenothiazines (medicines to treat certain mental problems or severe nausea or vomiting), or a steroid medicine. Meperidine (Demerol®) is a narcotic pain medicine. Some medicines for heart rhythm problems are disopyramide, procainamide, quinidine, Cardioquin®, Norpace®, Procanbid®, or Benjy Coon. Diphenhydramine (Benadryl®) is an antihistamine. Some phenothiazine medicines are Compazine®, Mellaril®, Phenergan®, Serentil®, Tacaryl®, Thorazine®, or Trilafon®. Some medicines to treat depression are amitriptyline, nortriptyline, Norpramin®, or Vivactil®. Cortisone and prednisone are steroid medicines. · You should not use dicyclomine within 2 to 3 hours of taking antacids (Maalox®, Mylanta®) or medicine to stop diarrhea. Warnings While Using This Medicine: · Make sure your doctor knows if you are pregnant, if you have liver disease, kidney disease, or overactive thyroid. Tell your doctor about any heart or blood vessel problems you have, including heart rhythm problems, congestive heart failure, or high blood pressure. Make sure your doctor knows if you have ongoing diarrhea, or an ileostomy or colostomy. Tell your doctor if you have autonomic neuropathy (a nerve problem), or a hiatal hernia (problems with your esophagus). · This medicine may make you sweat less. Your body could get too hot if you do not sweat enough. Stay out of hot places. Try to stay indoors or somewhere cool during hot weather. If you have a fever, call your doctor for advice. If your body gets too hot, you might feel dizzy, weak, tired, or confused. You might have an upset stomach or vomit. Call your doctor if you are too hot and cannot cool down. · This medicine may make you drowsy. Avoid driving, using machines, or doing anything else that could be dangerous if you are not alert. · Your eyes may be more sensitive to bright light while you are using this medicine. You may want to wear sunglasses in bright sunlight. Possible Side Effects While Using This Medicine:  
Call your doctor right away if you notice any of these side effects: · Allergic reaction: Itching or hives, swelling in your face or hands, swelling or tingling in your mouth or throat, chest tightness, trouble breathing · Fast or uneven heartbeat. · Restlessness, agitation, or confusion. · Severe dizziness or light-headedness. If you notice these less serious side effects, talk with your doctor: · Decrease in how much or how often you urinate. · Drowsiness or weakness. · Dry mouth. · Nausea, vomiting, constipation, or stomach pain. · Trouble focusing or other vision changes. If you notice other side effects that you think are caused by this medicine, tell your doctor. Call your doctor for medical advice about side effects. You may report side effects to FDA at 6-890-FDA-1849 © 2017 [facility] Information is for End User's use only and may not be sold, redistributed or otherwise used for commercial purposes. The above information is an  only. It is not intended as medical advice for individual conditions or treatments. Talk to your doctor, nurse or pharmacist before following any medical regimen to see if it is safe and effective for you. Irritable Bowel Syndrome: Care Instructions Your Care Instructions Irritable bowel syndrome, or IBS, is a problem with the intestines that causes belly pain, bloating, gas, constipation, and diarrhea. The cause of IBS is not well known. IBS can last for many years, but it does not get worse over time or lead to serious disease.  
Most people can control their symptoms by changing their diet and reducing stress. Follow-up care is a key part of your treatment and safety. Be sure to make and go to all appointments, and call your doctor if you are having problems. It's also a good idea to know your test results and keep a list of the medicines you take. How can you care for yourself at home? · For constipation: 
¨ Include fruits, vegetables, beans, and whole grains in your diet each day. These foods are high in fiber. ¨ Drink plenty of fluids, enough so that your urine is light yellow or clear like water. If you have kidney, heart, or liver disease and have to limit fluids, talk with your doctor before you increase the amount of fluids you drink. ¨ Get some exercise every day. Build up slowly to 30 to 60 minutes a day on 5 or more days of the week. ¨ Take a fiber supplement, such as Citrucel or Metamucil, every day if needed. Read and follow all instructions on the label. ¨ Schedule time each day for a bowel movement. Having a daily routine may help. Take your time and do not strain when having a bowel movement. · If you often have diarrhea, limit foods and drinks that make it worse. These are different for each person but may include caffeine (found in coffee, tea, chocolate, and cola drinks), alcohol, fatty foods, gas-producing foods (such as beans, cabbage, and broccoli), some dairy products, and spicy foods. Do not eat candy or gum that contains sorbitol. · Keep a daily diary of what you eat and what symptoms you have. This may help find foods that cause you problems. · Eat slowly. Try to make mealtime relaxing. · Find ways to reduce stress. · Get at least 30 minutes of exercise on most days of the week. Exercise can help reduce tension and prevent constipation. Walking is a good choice. You also may want to do other activities, such as running, swimming, cycling, or playing tennis or team sports. When should you call for help? Call your doctor now or seek immediate medical care if: · Your pain is different than usual or occurs with fever. · You lose weight without trying, or you lose your appetite and you do not know why. · Your symptoms often wake you from sleep. · Your stools are black and tarlike or have streaks of blood. Watch closely for changes in your health, and be sure to contact your doctor if: 
· Your IBS symptoms get worse or begin to disrupt your day-to-day life. · You become more tired than usual. 
· Your home treatment stops working. Where can you learn more? Go to http://demarco-max.info/. Enter O992 in the search box to learn more about \"Irritable Bowel Syndrome: Care Instructions. \" Current as of: August 9, 2016 Content Version: 11.3 © 5751-0032 [employer]. Care instructions adapted under license by [employer] (which disclaims liability or warranty for this information). If you have questions about a medical condition or this instruction, always ask your healthcare professional. [place] [address] any warranty or liability for your use of this information. Hydrochlorothiazide (By mouth) Hydrochlorothiazide (genesis-droe-klor-kn-HZEU-i-zide) Treats high blood pressure and fluid retention (edema). This medicine is a diuretic (water pill). Brand Name(s): Microzide There may be other brand names for this medicine. When This Medicine Should Not Be Used: This medicine is not right for everyone. Do not use this medicine if you had an allergic reaction to hydrochlorothiazide or a sulfa drug. How to Use This Medicine:  
Capsule, Liquid, Tablet · Take your medicine as directed. Your dose may need to be changed several times to find what works best for you. · Measure the oral liquid medicine with a marked measuring spoon, oral syringe, or medicine cup. · Missed dose: Take a dose as soon as you remember. If it is almost time for your next dose, wait until then and take a regular dose.  Do not take extra medicine to make up for a missed dose. · Store the medicine in a closed container at room temperature, away from heat, moisture, and direct light. Drugs and Foods to Avoid: Ask your doctor or pharmacist before using any other medicine, including over-the-counter medicines, vitamins, and herbal products. · Some medicines and foods can affect how hydrochlorothiazide works. Tell your doctor if you are also using any of the following: ¨ Cholestyramine, colestipol, digoxin, lithium, insulin or other diabetes medicine ¨ An NSAID pain or arthritis medicine (such as aspirin, diclofenac, ibuprofen, naproxen, celecoxib), or a steroid medicine (such as hydrocortisone, methylprednisolone, prednisone, prednisolone, dexamethasone) Warnings While Using This Medicine: · Tell your doctor if you are pregnant or breastfeeding, or if you have kidney disease, liver disease, heart disease or heart failure, high cholesterol, diabetes, gout, trouble urinating, or lupus. · This medicine may cause the following problems: ¨ Glaucoma and other vision problems ¨ Acute gout ¨ Damage to the parathyroid gland ¨ Low or high levels of minerals in your blood (including potassium and sodium) · This medicine may make you dizzy. Do not drive or do anything else that could be dangerous until you know how this medicine affects you. · This medicine could lower your blood pressure too much, especially when you first use it or if you are dehydrated. Stand or sit up slowly if you feel lightheaded or dizzy. Alcohol may make this problem worse. · Tell any doctor or dentist who treats you that you are using this medicine. · Your doctor will check your progress and the effects of this medicine at regular visits. Keep all appointments. · Keep all medicine out of the reach of children. Never share your medicine with anyone. Possible Side Effects While Using This Medicine: Call your doctor right away if you notice any of these side effects: · Allergic reaction: Itching or hives, swelling in your face or hands, swelling or tingling in your mouth or throat, chest tightness, trouble breathing · Blistering, peeling, or red skin rash · Confusion, weakness, and muscle twitching · Dry mouth, increased thirst, muscle cramps, nausea or vomiting, uneven heartbeat · Lightheadedness, dizziness, or fainting · Nausea, vomiting, unusual tiredness or weakness, muscle cramps, confusion · Trouble seeing, eye pain, blurred vision or other vision changes If you notice these less serious side effects, talk with your doctor:  
· Headache · Mild diarrhea, constipation, nausea If you notice other side effects that you think are caused by this medicine, tell your doctor. Call your doctor for medical advice about side effects. You may report side effects to FDA at 3-392-FDA-1367 © 2017 [address] Information is for End User's use only and may not be sold, redistributed or otherwise used for commercial purposes. The above information is an  only. It is not intended as medical advice for individual conditions or treatments. Talk to your doctor, nurse or pharmacist before following any medical regimen to see if it is safe and effective for you. Introducing [facility]! Dear Ernie Lovelace: 
Thank you for requesting a [employer] account. Our records indicate that you already have an active [employer] account. You can access your account anytime at [email or web address] Did you know that you can access your hospital and ER discharge instructions at any time in [employer]? You can also review all of your test results from your hospital stay or ER visit. Additional Information If you have questions, please visit the Frequently Asked Questions section of the [employer] website at [email or web address] Remember, MyChart is NOT to be used for urgent needs. For medical emergencies, dial 911. Now available from your iPhone and Android! Please provide this summary of care documentation to your next provider. Your primary care clinician is listed as Bartolome French. If you have any questions after today's visit, please call [phone].

## 2017-09-02 LAB
25(OH)D3+25(OH)D2 SERPL-MCNC: 23.6 NG/ML (ref 30–100)
ALBUMIN SERPL-MCNC: 4.8 G/DL (ref 3.5–5.5)
ALBUMIN/GLOB SERPL: 2.1 {RATIO} (ref 1.2–2.2)
ALP SERPL-CCNC: 70 IU/L (ref 39–117)
ALT SERPL-CCNC: 79 IU/L (ref 0–32)
AST SERPL-CCNC: 39 IU/L (ref 0–40)
BASOPHILS # BLD AUTO: 0.1 X10E3/UL (ref 0–0.2)
BASOPHILS NFR BLD AUTO: 1 %
BILIRUB SERPL-MCNC: 1.4 MG/DL (ref 0–1.2)
BUN SERPL-MCNC: 10 MG/DL (ref 6–24)
BUN/CREAT SERPL: 14 (ref 9–23)
CALCIUM SERPL-MCNC: 9.7 MG/DL (ref 8.7–10.2)
CHLORIDE SERPL-SCNC: 103 MMOL/L (ref 96–106)
CHOLEST SERPL-MCNC: 203 MG/DL (ref 100–199)
CO2 SERPL-SCNC: 22 MMOL/L (ref 18–29)
CREAT SERPL-MCNC: 0.73 MG/DL (ref 0.57–1)
EOSINOPHIL # BLD AUTO: 0.2 X10E3/UL (ref 0–0.4)
EOSINOPHIL NFR BLD AUTO: 2 %
ERYTHROCYTE [DISTWIDTH] IN BLOOD BY AUTOMATED COUNT: 13.1 % (ref 12.3–15.4)
GLOBULIN SER CALC-MCNC: 2.3 G/DL (ref 1.5–4.5)
GLUCOSE SERPL-MCNC: 90 MG/DL (ref 65–99)
HCT VFR BLD AUTO: 43.6 % (ref 34–46.6)
HDLC SERPL-MCNC: 45 MG/DL
HGB BLD-MCNC: 14.6 G/DL (ref 11.1–15.9)
IMM GRANULOCYTES # BLD: 0 X10E3/UL (ref 0–0.1)
IMM GRANULOCYTES NFR BLD: 0 %
LDLC SERPL CALC-MCNC: 134 MG/DL (ref 0–99)
LYMPHOCYTES # BLD AUTO: 2.8 X10E3/UL (ref 0.7–3.1)
LYMPHOCYTES NFR BLD AUTO: 35 %
MCH RBC QN AUTO: 29.7 PG (ref 26.6–33)
MCHC RBC AUTO-ENTMCNC: 33.5 G/DL (ref 31.5–35.7)
MCV RBC AUTO: 89 FL (ref 79–97)
MONOCYTES # BLD AUTO: 0.6 X10E3/UL (ref 0.1–0.9)
MONOCYTES NFR BLD AUTO: 7 %
NEUTROPHILS # BLD AUTO: 4.6 X10E3/UL (ref 1.4–7)
NEUTROPHILS NFR BLD AUTO: 55 %
PLATELET # BLD AUTO: 268 X10E3/UL (ref 150–379)
POTASSIUM SERPL-SCNC: 4.3 MMOL/L (ref 3.5–5.2)
PROT SERPL-MCNC: 7.1 G/DL (ref 6–8.5)
RBC # BLD AUTO: 4.92 X10E6/UL (ref 3.77–5.28)
SODIUM SERPL-SCNC: 140 MMOL/L (ref 134–144)
TRIGL SERPL-MCNC: 122 MG/DL (ref 0–149)
TSH SERPL DL<=0.005 MIU/L-ACNC: 1.66 UIU/ML (ref 0.45–4.5)
VLDLC SERPL CALC-MCNC: 24 MG/DL (ref 5–40)
WBC # BLD AUTO: 8.2 X10E3/UL (ref 3.4–10.8)

## 2017-09-05 DIAGNOSIS — E55.9 VITAMIN D DEFICIENCY: Primary | ICD-10-CM

## 2017-09-05 PROBLEM — R74.01 ELEVATED ALT MEASUREMENT: Status: ACTIVE | Noted: 2017-09-05

## 2017-09-05 PROBLEM — E80.6 HYPERBILIRUBINEMIA: Status: ACTIVE | Noted: 2017-09-05

## 2017-09-05 RX ORDER — MELATONIN
1000 DAILY
Qty: 60 TAB | Refills: 11 | Status: SHIPPED | OUTPATIENT
Start: 2017-09-05 | End: 2019-07-26

## 2017-09-05 NOTE — PROGRESS NOTES
CBC was normal, LDL cholesterol elevated at 134 but is improved since last year, bilirubin and ALT elevated

## 2017-09-12 ENCOUNTER — OFFICE VISIT (OUTPATIENT)
Dept: CARDIOLOGY CLINIC | Age: 42
End: 2017-09-12

## 2017-09-12 VITALS
BODY MASS INDEX: 27.46 KG/M2 | OXYGEN SATURATION: 97 % | HEIGHT: 69 IN | HEART RATE: 89 BPM | WEIGHT: 185.4 LBS | DIASTOLIC BLOOD PRESSURE: 85 MMHG | SYSTOLIC BLOOD PRESSURE: 132 MMHG

## 2017-09-12 DIAGNOSIS — R55 SYNCOPE, UNSPECIFIED SYNCOPE TYPE: Primary | ICD-10-CM

## 2017-09-12 DIAGNOSIS — R00.2 RAPID PALPITATIONS: ICD-10-CM

## 2017-09-12 DIAGNOSIS — R07.9 CHEST PAIN, UNSPECIFIED TYPE: ICD-10-CM

## 2017-09-12 DIAGNOSIS — E78.5 HYPERLIPIDEMIA, UNSPECIFIED HYPERLIPIDEMIA TYPE: ICD-10-CM

## 2017-09-12 NOTE — MR AVS SNAPSHOT
Visit Information Date & Time Provider Department Dept. Phone Encounter #  
 9/12/2017  3:00 PM Rodger Merrill MD [facility] 1 [id number] Your Appointments 9/22/2017  3:00 PM  
New Patient with DO K[facility] [address]) Appt Note: n/p possible TIA-referred by Corinne Kennedy $0cp 9/5/17 [address] [facility] [address]  
[phone]  
  
   
 [address] [address] Upcoming Health Maintenance Date Due INFLUENZA AGE 9 TO ADULT 8/1/2017 PAP AKA CERVICAL CYTOLOGY 2/22/2019 DTaP/Tdap/Td series (2 - Td) 2/22/2026 Allergies as of 9/12/2017  Review Complete On: 9/1/2017 By: Heidi Dawn NP Severity Noted Reaction Type Reactions Latex  02/03/2016    Rash  
 Sulfa (Sulfonamide Antibiotics) High 02/03/2016    Anaphylaxis Adhesive Tape-silicones  44/54/7251    Rash Morphine  02/03/2016    Rash Neosporin [Benzalkonium Chloride]  08/18/2016    Rash Current Immunizations  Reviewed on 2/23/2016 Name Date Influenza Vaccine (Quad) PF 2/3/2016 Pneumococcal Polysaccharide (PPSV-23) 9/1/2017 Tdap 2/22/2016 Not reviewed this visit You Were Diagnosed With   
  
 Codes Comments Syncope, unspecified syncope type    -  Primary ICD-10-CM: R55 
ICD-9-CM: 780.2 Hyperlipidemia, unspecified hyperlipidemia type     ICD-10-CM: E78.5 ICD-9-CM: 272.4 Chest pain, unspecified type     ICD-10-CM: R07.9 ICD-9-CM: 786.50 Rapid palpitations     ICD-10-CM: R00.2 ICD-9-CM: 785.1 Vitals BP Pulse Height(growth percentile) Weight(growth percentile) LMP SpO2  
 132/85 89 5' 9\" (1.753 m) 185 lb 6.4 oz (84.1 kg) 03/18/2016 97% BMI OB Status Smoking Status 27.38 kg/m2 Hysterectomy Former Smoker Vitals History BMI and BSA Data Body Mass Index Body Surface Area  
 27.38 kg/m 2 2.02 m 2 Preferred Pharmacy Pharmacy Name Phone [id number] [place] - [address] AT [address] [place] [address] [phone] Your Updated Medication List  
  
   
This list is accurate as of: 9/12/17  4:51 PM.  Always use your most recent med list.  
  
  
  
  
 cholecalciferol 1,000 unit tablet Commonly known as:  VITAMIN D3 Take 1 Tab by mouth daily. hydroCHLOROthiazide 12.5 mg tablet Commonly known as:  HYDRODIURIL Take 1 Tab by mouth daily. LORazepam 1 mg tablet Commonly known as:  ATIVAN Take 1 Tab by mouth two (2) times a day. Max Daily Amount: 2 mg. Prn anxiety  
  
 sertraline 50 mg tablet Commonly known as:  ZOLOFT Take 1 Tab by mouth daily. ZYRTEC PO Take  by mouth as needed. We Performed the Following AMB POC EKG ROUTINE W/ 12 LEADS, INTER & REP [45188 CPT(R)] To-Do List   
 09/12/2017 ECHO:  2D ECHO COMPLETE ADULT (TTE) W OR WO CONTR   
  
 09/12/2017 ECG:  ECG HOLTER MONITOR, UP TO 48 HRS   
  
 12/19/2017 8:30 AM  
  Appointment with [address] \[facility] [place] [facility]\"" at [facility] ([phone]) Shower or bathe using soap and water. Do not use deodorant, powder, perfumes, or lotion the day of your exam.  If your prior mammograms were not performed at [facility] 6 please bring films with you or forward prior images 2 days before your procedure. If patient is not a callback diagnostic, the patient must have an order/script from the physician for the diagnostic. Please bring it on the day of the mammogram or have it faxed to the department. [facility] [place]  [phone] [address] [place] [address] [phone] [facility]  [phone] [phone] [facility] [phone] [facility] [phone] Hermes Thomason [phone]  
  
 12/19/2017 9:00 AM  
  Appointment with [address] [place] [address] at [facility] ([phone]) Shower or bathe using soap and water. Do not use deodorant, powder, perfumes, or lotion. If your prior films were not performed at a local [address] [place] [address] facility please bring or forward prior images 2 days before procedure. Patient Instructions -- Please schedule the echo and holter monitor here at [facility] [address] -- Please make a follow up appointment to discuss test results in 2-3 weeks Transthoracic Echocardiogram: About This Test 
What is it? An echocardiogram (also called an echo) uses sound waves to make an image of your heart. A device called a transducer sends sound waves that echo off your heart and back to the transducer. These echoes are turned into moving pictures of your heart that can be seen on a video screen. In a transthoracic echocardiogram (TTE), the transducer is moved across your chest or belly. A TTE is the most common type of echocardiogram. 
Why is this test done? This test is done to check your heart health. It's used for many reasons. Your doctor may do an echocardiogram to: · Check a heart murmur. · Look for the cause of shortness of breath or unexplained chest pain or pressure. · Check how well your heart is pumping blood. · Check to see how well your heart valves are working. · Look for blood clots inside your heart. What happens during the test? 
· You will remove your clothes above your waist. You may be given a cloth or paper covering to use during the test. 
· You will lie on your back or on your left side on a bed or table. · You may receive medicine through a vein (intravenously, or IV). The IV can be used to give you a contrast material, which helps your doctor get good views of your heart. · Small pads or patches (electrodes) will be taped to your arms and legs to record your heart rate during the test. 
· A small amount of gel will be rubbed on the side of your chest to help  the sound waves. · The transducer will be pressed firmly against your chest and moved slowly back and forth. It is usually moved to different areas on your chest or belly to get specific views of your heart. · You will be asked to do several things, such as hold very still, breathe in and out very slowly, hold your breath, or lie on your left side. This test usually takes 30 to 60 minutes. What else should you know about the test? 
· You will not have any pain from an echocardiogram. You may have a brief, sharp pain if an intravenous (IV) needle is placed in a vein in your arm. · No electricity passes through your body during the test. There is no danger of getting an electrical shock. · You do not receive any radiation. What happens after the test? 
· You will probably be able to go home right away. · You can go back to your usual activities right away. Follow-up care is a key part of your treatment and safety. Be sure to make and go to all appointments, and call your doctor if you are having problems. It's also a good idea to keep a list of the medicines you take. Ask your doctor when you can expect to have your test results. Where can you learn more? Go to http://demarco-max.info/. Enter E130 in the search box to learn more about \"Transthoracic Echocardiogram: About This Test.\" Current as of: April 3, 2017 Content Version: 11.3 © 5499-9550 Simplificare. Care instructions adapted under license by Copyright Agent (which disclaims liability or warranty for this information). If you have questions about a medical condition or this instruction, always ask your healthcare professional. [place] [address] any warranty or liability for your use of this information. Cardiac Event Monitoring: About This Test 
What is it?  
 
Cardiac event monitoring is a test that records the electrical activity of your heart. The test is done with a heart monitor device that you may wear or keep with you for up to a month. This test may be done to find out why you're having symptoms. Or it may be done to look for heartbeats that are too fast, too slow, or irregular. These are cardiac events. The monitor will give your doctor information similar to an electrocardiogram (EKG or ECG). An EKG translates the heart's electrical activity into line tracings on paper. There are different types of monitors. Your doctor will choose the type that works best for you and is most likely to help diagnose your heart problem. These monitors are safe to use. No electricity is sent through your body. So there is no chance of getting an electric shock. Why is this test done? This test is used to look for irregular heartbeats. It can help your doctor find out what is causing chest pain, fainting, lightheadedness, or other symptoms of heart problems. It also can help the doctor check to see if treatment for an irregular heartbeat is working. Many people have irregular heartbeats from time to time. Because these kinds of heartbeats can come and go, it may be hard to record one while you are in the doctor's office. Monitoring your heart for a longer time and during your normal activities makes it easier to capture your cardiac events. What happens before the test? 
If you are getting a monitor with electrode pads on your chest: 
· Several areas on your chest may be shaved and cleaned. Then a small amount of gel will be put on those areas. The electrode pads will then be attached to the skin of your chest. Thin wires will connect the electrodes to the monitor. · You will get instructions for how and when to change the electrodes at home. Some types of monitors don't use electrode pads. Some types are worn on your wrist like a watch.  Others are stuck to your chest with a sticky patch. Or you may have a monitor that you carry with you. Your doctor will explain which type of monitor you have and how to use it. What happens during the test? 
· Your monitor may start recording on its own when it detects an abnormal heartbeat. Or you may need to do something to start the recording when you have symptoms. You might use a handheld device to start the monitor. Or you may need to press a button on the monitor itself. Your doctor will explain which type you have and what you need to do. · You may keep a diary of what you were doing when you had symptoms such as chest pain or dizziness. Your doctor will show you how to do this. · You may need to send the information from your monitor to your doctor through a phone line or online. Some monitors send it automatically. You will get instructions from your doctor. Your information will stay private and secure whether you send it or it is sent automatically. · You may be able to do most of the things you usually do. But it's important to follow your doctor's instructions for bathing, exercise, and other daily activities. How long does the test take? You may use the monitor for up to a month or longer. It depends on how long it takes to record irregular heartbeat episodes. It also depends on how long your doctor wants to keep monitoring your heart. What happens after the test? 
· Lauren Humphreys return the monitor to your doctor's office or hospital. 
· You'll meet with your doctor to talk about the information recorded during your test. 
· Your doctor will check your diary of symptoms. He or she will compare the timing of your activities and symptoms with the recorded heart pattern. · Depending on your test results, your doctor may talk with you about other tests or treatment options. Follow-up care is a key part of your treatment and safety.  Be sure to make and go to all appointments, and call your doctor if you are having problems. It's also a good idea to keep a list of the medicines you take. Ask your doctor when you can expect to have your test results. Where can you learn more? Go to http://demarco-max.info/. Enter B927 in the search box to learn more about \"Cardiac Event Monitoring: About This Test.\" Current as of: March 7, 2017 Content Version: 11.3 © 7219-7883 Flight Steward. Care instructions adapted under license by [employer] (which disclaims liability or warranty for this information). If you have questions about a medical condition or this instruction, always ask your healthcare professional. [address] any warranty or liability for your use of this information. Introducing [facility]! Dear Jazmin Maynard: 
Thank you for requesting a [employer]he[employer] account. Our records indicate that you already have an active Dhe[employer] account. You can access your account anytime at [email or web address] Did you know that you can access your hospital and ER discharge instructions at any time in Dheere Bolo? You can also review all of your test results from your hospital stay or ER visit. Additional Information If you have questions, please visit the Frequently Asked Questions section of the Dheere [employer] website at [email or web address] Remember, Dheere Bolo is NOT to be used for urgent needs. For medical emergencies, dial 911. Now available from your iPhone and Android! Please provide this summary of care documentation to your next provider. Your primary care clinician is listed as Ian Kovacs. If you have any questions after today's visit, please call [phone].

## 2017-09-12 NOTE — PATIENT INSTRUCTIONS
-- Please schedule the echo and holter monitor here at [facility] [address]  -- Please make a follow up appointment to discuss test results in 2-3 weeks       Transthoracic Echocardiogram: About This Test  What is it? An echocardiogram (also called an echo) uses sound waves to make an image of your heart. A device called a transducer sends sound waves that echo off your heart and back to the transducer. These echoes are turned into moving pictures of your heart that can be seen on a video screen. In a transthoracic echocardiogram (TTE), the transducer is moved across your chest or belly. A TTE is the most common type of echocardiogram.  Why is this test done? This test is done to check your heart health. It's used for many reasons. Your doctor may do an echocardiogram to:  · Check a heart murmur. · Look for the cause of shortness of breath or unexplained chest pain or pressure. · Check how well your heart is pumping blood. · Check to see how well your heart valves are working. · Look for blood clots inside your heart. What happens during the test?  · You will remove your clothes above your waist. You may be given a cloth or paper covering to use during the test.  · You will lie on your back or on your left side on a bed or table. · You may receive medicine through a vein (intravenously, or IV). The IV can be used to give you a contrast material, which helps your doctor get good views of your heart. · Small pads or patches (electrodes) will be taped to your arms and legs to record your heart rate during the test.  · A small amount of gel will be rubbed on the side of your chest to help  the sound waves. · The transducer will be pressed firmly against your chest and moved slowly back and forth. It is usually moved to different areas on your chest or belly to get specific views of your heart.   · You will be asked to do several things, such as hold very still, breathe in and out very slowly, hold your breath, or lie on your left side. This test usually takes 30 to 60 minutes. What else should you know about the test?  · You will not have any pain from an echocardiogram. You may have a brief, sharp pain if an intravenous (IV) needle is placed in a vein in your arm. · No electricity passes through your body during the test. There is no danger of getting an electrical shock. · You do not receive any radiation. What happens after the test?  · You will probably be able to go home right away. · You can go back to your usual activities right away. Follow-up care is a key part of your treatment and safety. Be sure to make and go to all appointments, and call your doctor if you are having problems. It's also a good idea to keep a list of the medicines you take. Ask your doctor when you can expect to have your test results. Where can you learn more? Go to http://demarco-max.info/. Enter E130 in the search box to learn more about \"Transthoracic Echocardiogram: About This Test.\"  Current as of: April 3, 2017  Content Version: 11.3  © 8093-1819 Lucena Research. Care instructions adapted under license by Xen[employer] Biosciences (which disclaims liability or warranty for this information). If you have questions about a medical condition or this instruction, always ask your healthcare professional. [address] any warranty or liability for your use of this information. Cardiac Event Monitoring: About This Test  What is it? Cardiac event monitoring is a test that records the electrical activity of your heart. The test is done with a heart monitor device that you may wear or keep with you for up to a month. This test may be done to find out why you're having symptoms. Or it may be done to look for heartbeats that are too fast, too slow, or irregular. These are cardiac events.   The monitor will give your doctor information similar to an electrocardiogram (EKG or ECG). An EKG translates the heart's electrical activity into line tracings on paper. There are different types of monitors. Your doctor will choose the type that works best for you and is most likely to help diagnose your heart problem. These monitors are safe to use. No electricity is sent through your body. So there is no chance of getting an electric shock. Why is this test done? This test is used to look for irregular heartbeats. It can help your doctor find out what is causing chest pain, fainting, lightheadedness, or other symptoms of heart problems. It also can help the doctor check to see if treatment for an irregular heartbeat is working. Many people have irregular heartbeats from time to time. Because these kinds of heartbeats can come and go, it may be hard to record one while you are in the doctor's office. Monitoring your heart for a longer time and during your normal activities makes it easier to capture your cardiac events. What happens before the test?  If you are getting a monitor with electrode pads on your chest:  · Several areas on your chest may be shaved and cleaned. Then a small amount of gel will be put on those areas. The electrode pads will then be attached to the skin of your chest. Thin wires will connect the electrodes to the monitor. · You will get instructions for how and when to change the electrodes at home. Some types of monitors don't use electrode pads. Some types are worn on your wrist like a watch. Others are stuck to your chest with a sticky patch. Or you may have a monitor that you carry with you. Your doctor will explain which type of monitor you have and how to use it. What happens during the test?  · Your monitor may start recording on its own when it detects an abnormal heartbeat. Or you may need to do something to start the recording when you have symptoms. You might use a handheld device to start the monitor.  Or you may need to press a button on the monitor itself. Your doctor will explain which type you have and what you need to do. · You may keep a diary of what you were doing when you had symptoms such as chest pain or dizziness. Your doctor will show you how to do this. · You may need to send the information from your monitor to your doctor through a phone line or online. Some monitors send it automatically. You will get instructions from your doctor. Your information will stay private and secure whether you send it or it is sent automatically. · You may be able to do most of the things you usually do. But it's important to follow your doctor's instructions for bathing, exercise, and other daily activities. How long does the test take? You may use the monitor for up to a month or longer. It depends on how long it takes to record irregular heartbeat episodes. It also depends on how long your doctor wants to keep monitoring your heart. What happens after the test?  · Georgina Silveira return the monitor to your doctor's office or hospital.  · You'll meet with your doctor to talk about the information recorded during your test.  · Your doctor will check your diary of symptoms. He or she will compare the timing of your activities and symptoms with the recorded heart pattern. · Depending on your test results, your doctor may talk with you about other tests or treatment options. Follow-up care is a key part of your treatment and safety. Be sure to make and go to all appointments, and call your doctor if you are having problems. It's also a good idea to keep a list of the medicines you take. Ask your doctor when you can expect to have your test results. Where can you learn more? Go to http://demarco-max.info/. Enter K008 in the search box to learn more about \"Cardiac Event Monitoring: About This Test.\"  Current as of: March 7, 2017  Content Version: 11.3  © 8821-8057 [employer]vIt[employer].  Care instructions adapted under license by [employer] Connections (which disclaims liability or warranty for this information). If you have questions about a medical condition or this instruction, always ask your healthcare professional. [address] any warranty or liability for your use of this information.

## 2017-09-12 NOTE — Clinical Note
Thank you very much for this interesting referral.  She may turn out to have some form of dysautonomia but we need to make sure she is not having an arrhythmia and exclude potential anatomic substrate for that. I will keep you posted.  Jude Sanchez

## 2017-09-12 NOTE — PROGRESS NOTES
[facility]   [address] [facility], [address] [place] [address]                                          NEW PATIENT HPI/FOLLOW-UP      NAME:  Serina Prader   :   1975   MRN:   [id number]   PCP:  Lester Delcid NP           Subjective: The patient is a 40y.o. year old female h/o high-functioning autism/Asperger's, HTN, non-alcoholic fatty liver disease, IBS, anxiety and high cholesterol who presents to clinic for evaluation of syncopal episodes x 2. Patient reports that a few weeks ago, she was at school - which she describes as a high stress environment - sitting at her desk when she felt hot, sweaty, racing heart, and chest pain and \"passed out\". She describes this as \"slumping\" onto her desk. It was unwitnessed and she does not know how long she was out, but she recovered on her own, without aid from co-workers or anyone else. She reports a repeat episode later that week. She saw her PCP, SIGIFREDO Parkinson, who placed her on Zoloft for anxiety and managed her HTN and referred her to cardiology for evaluation. Pt reports occasional palpitations. She reports occasional SOB. No exertional sx. She reports intermittent swelling of both hands and feet. She is a former smoker. A non-drinker and a rare marijuana user. Doing satisfactorily. Review of Systems  General: Pt denies excessive weight gain or loss. Pt is able to conduct ADL's. Respiratory: +shortness of breath, Denies DALAL, wheezing or stridor.   Cardiovascular: +precordial pain, palpitations, edema   Gastrointestinal: Denies poor appetite, indigestion, abdominal pain or blood in stool  Peripheral vascular: Denies claudication, leg cramps  Neuropsychiatric: +anxiety Denies paresthesias,tingling,numbness,,depression,fatigue  Musculoskeletal: Denies pain,tenderness, soreness,swelling      Past Medical History:   Diagnosis Date    Adverse effect of anesthesia     hard to wake up    Anxiety     Arthritis     back    Asperger syndrome     Asthma     Hypercholesterolemia     IBD (inflammatory bowel disease)     Ill-defined condition     discomfort hips shoulders knees and feet and hands MAURICIO was positive    Irritable bowel disease 1995    Psychiatric disorder     anxiety and depression     Patient Active Problem List    Diagnosis Date Noted    Hyperbilirubinemia 09/05/2017    Elevated ALT measurement 09/05/2017    Family history of exposure to Agent Orange 12/21/2016    Cellulitis of left upper extremity 08/19/2016    Arthralgia 08/19/2016    Positive MAURICIO (antinuclear antibody) 08/19/2016    Hyperlipidemia 08/19/2016    Vitamin D deficiency 08/19/2016    Anxiety 08/19/2016    DDD (degenerative disc disease), lumbar 08/19/2016    Colitis 08/19/2016    Mild intermittent asthma without complication 10/09/9982    Fibroids 03/23/2016      Past Surgical History:   Procedure Laterality Date    HX HYSTERECTOMY  4/14/16    da Dotty Robotic Total Laparoscopic Hysterectomy with bilateral  salpingectomy more than 250 grams. Cystoscopy.     HX ORTHOPAEDIC      Spinal fusion, back surgery,rods and screws    HX ORTHOPAEDIC      achilles lenghtening    HX ORTHOPAEDIC      laminectomy    HX OTHER SURGICAL      tumor removed from nose as child    HX OTHER SURGICAL      dental extraction     Allergies   Allergen Reactions    Latex Rash    Sulfa (Sulfonamide Antibiotics) Anaphylaxis    Adhesive Tape-Silicones Rash    Morphine Rash    Neosporin [Benzalkonium Chloride] Rash      Family History   Problem Relation Age of Onset    Cancer Mother     Heart Disease Father     Depression Father     Cancer Sister     Alzheimer Maternal Grandmother     Diabetes Maternal Grandfather     Heart Surgery Paternal Grandmother     Heart Surgery Paternal Grandfather       Social History     Social History    Marital status:      Spouse name: N/A    Number of children: N/A    Years of education: N/A Occupational History    Not on file. Social History Main Topics    Smoking status: Former Smoker     Packs/day: 1.00     Years: 10.00     Quit date: 4/7/2004    Smokeless tobacco: Never Used    Alcohol use No    Drug use: No    Sexual activity: Yes     Partners: Female     Birth control/ protection: None     Other Topics Concern    Not on file     Social History Narrative      Current Outpatient Prescriptions   Medication Sig    CETIRIZINE HCL (ZYRTEC PO) Take  by mouth as needed.  cholecalciferol (VITAMIN D3) 1,000 unit tablet Take 1 Tab by mouth daily.  sertraline (ZOLOFT) 50 mg tablet Take 1 Tab by mouth daily.  hydroCHLOROthiazide (HYDRODIURIL) 12.5 mg tablet Take 1 Tab by mouth daily.  LORazepam (ATIVAN) 1 mg tablet Take 1 Tab by mouth two (2) times a day. Max Daily Amount: 2 mg. Prn anxiety     No current facility-administered medications for this visit. I have reviewed the MAs notes, vitals, problem list, allergy list, medical history, family medical, social history and medications. Objective:     Physical Exam:     Vitals:    09/12/17 1520   BP: 132/85   Pulse: 89   SpO2: 97%   Weight: 185 lb 6.4 oz (84.1 kg)   Height: 5' 9\" (1.753 m)    Body mass index is 27.38 kg/(m^2). General: WDWN adult female, in no acute distress. Full affect. SO accompanying her during visit. HEENT: No carotid bruits, no JVD, trach is midline. Heart:  Normal S1/S2 negative S3 or S4. Regular, no murmur, gallop or rub.   Respiratory: Clear bilaterally, no wheezing or rales  Abdomen:   Soft, non-tender, bowel sounds are active.   Extremities:  No edema, normal cap refill, no cyanosis. Neuro: A&Ox3, speech clear, gait stable. Skin: Skin color is normal. No rashes or lesions. No diaphoresis. Vascular: 2+ pulses symmetric in all extremities      Data Review:       Cardiographics:    EKG: NSR, biphasic P waves I, aVL, V1-V2, and left axis.  Early QRS fragmentation with normal IL interval. Cardiology Labs:    No results found for this or any previous visit. Lab Results   Component Value Date/Time    Cholesterol, total 203 09/01/2017 04:18 PM    HDL Cholesterol 45 09/01/2017 04:18 PM    LDL, calculated 134 09/01/2017 04:18 PM    Triglyceride 122 09/01/2017 04:18 PM       Lab Results   Component Value Date/Time    Sodium 140 09/01/2017 04:18 PM    Potassium 4.3 09/01/2017 04:18 PM    Chloride 103 09/01/2017 04:18 PM    CO2 22 09/01/2017 04:18 PM    Anion gap 10 04/15/2016 04:48 AM    Glucose 90 09/01/2017 04:18 PM    BUN 10 09/01/2017 04:18 PM    Creatinine 0.73 09/01/2017 04:18 PM    BUN/Creatinine ratio 14 09/01/2017 04:18 PM    GFR est  09/01/2017 04:18 PM    GFR est non- 09/01/2017 04:18 PM    Calcium 9.7 09/01/2017 04:18 PM    Bilirubin, total 1.4 09/01/2017 04:18 PM    AST (SGOT) 39 09/01/2017 04:18 PM    Alk. phosphatase 70 09/01/2017 04:18 PM    Protein, total 7.1 09/01/2017 04:18 PM    Albumin 4.8 09/01/2017 04:18 PM    A-G Ratio 2.1 09/01/2017 04:18 PM    ALT (SGPT) 79 09/01/2017 04:18 PM          Assessment:       ICD-10-CM ICD-9-CM    1. Syncope, unspecified syncope type R55 780.2 AMB POC EKG ROUTINE W/ 12 LEADS, INTER & REP   2. Hyperlipidemia, unspecified hyperlipidemia type E78.5 272.4    3. Chest pain, unspecified type R07.9 786.50          Discussion: Patient presents at this time stable from a cardiac perspective with concerning syncopal episodes. BP well controlled. Plan:   Discussed with Dr. Pagan     1. Continue same meds. Lipid profile and labs followed by PCP.   -- Echo and 48-hr holter    2. Encouraged to exercise to tolerance, lose weight, stop smoking and follow low fat, low cholesterol, low sodium predominantly Plant-based (consider Mediterranean) diet. 3.Follow up: 2 weeks after testing completed   -- Call with questions or concerns. -- Will follow up any test results by phone and/or f/u here in office if needed. .      I have discussed the diagnosis with the patient and the intended plan as seen in the above orders. The patient has received an after-visit summary and questions were answered concerning future plans. I have discussed any concerning medication side effects and warnings with the patient as well.     Patrick Cowan PA-C  9/12/2017

## 2017-09-18 ENCOUNTER — OFFICE VISIT (OUTPATIENT)
Dept: INTERNAL MEDICINE CLINIC | Facility: CLINIC | Age: 42
End: 2017-09-18

## 2017-09-18 ENCOUNTER — HOSPITAL ENCOUNTER (OUTPATIENT)
Dept: NON INVASIVE DIAGNOSTICS | Age: 42
Discharge: HOME | End: 2017-09-18
Attending: PHYSICIAN ASSISTANT
Payer: COMMERCIAL

## 2017-09-18 VITALS
DIASTOLIC BLOOD PRESSURE: 78 MMHG | OXYGEN SATURATION: 98 % | BODY MASS INDEX: 27.71 KG/M2 | SYSTOLIC BLOOD PRESSURE: 118 MMHG | TEMPERATURE: 98.4 F | WEIGHT: 187.1 LBS | RESPIRATION RATE: 18 BRPM | HEIGHT: 69 IN | HEART RATE: 88 BPM

## 2017-09-18 DIAGNOSIS — R03.0 ELEVATED BP WITHOUT DIAGNOSIS OF HYPERTENSION: ICD-10-CM

## 2017-09-18 DIAGNOSIS — F41.9 ANXIETY: Primary | ICD-10-CM

## 2017-09-18 DIAGNOSIS — R07.9 CHEST PAIN, UNSPECIFIED TYPE: ICD-10-CM

## 2017-09-18 DIAGNOSIS — R00.2 RAPID PALPITATIONS: ICD-10-CM

## 2017-09-18 DIAGNOSIS — R55 SYNCOPE, UNSPECIFIED SYNCOPE TYPE: ICD-10-CM

## 2017-09-18 PROCEDURE — 93306 TTE W/DOPPLER COMPLETE: CPT

## 2017-09-18 RX ORDER — SERTRALINE HYDROCHLORIDE 100 MG/1
100 TABLET, FILM COATED ORAL DAILY
Qty: 30 TAB | Refills: 5 | Status: SHIPPED | OUTPATIENT
Start: 2017-09-18 | End: 2017-10-18 | Stop reason: SDUPTHER

## 2017-09-18 NOTE — PROGRESS NOTES
holter 48  hr placed with insts and extra supplies given  Noting to pt to return device  To  on wed afternoon after three pm,

## 2017-09-18 NOTE — PROGRESS NOTES
Room 8    Chief Complaint   Patient presents with    Chest Pain     Patient states the pain started about an hour ago. No other symptoms     1. Have you been to the ER, urgent care clinic since your last visit? Hospitalized since your last visit? No    2. Have you seen or consulted any other health care providers outside of the [address] [place] [address] since your last visit? Include any pap smears or colon screening.  No     Health Maintenance Due   Topic Date Due    INFLUENZA AGE 9 TO ADULT  08/01/2017

## 2017-09-18 NOTE — PROGRESS NOTES
HISTORY OF PRESENT ILLNESS  Shane Hinton is a 40 y.o. female. Chief Complaint   Patient presents with    Chest Pain     Patient states the pain started about an hour ago. No other symptoms     HPI  1) Chest pain/Anxiety - has consulted with cardiology and has Echo and Holter scheduled at 2 PM today. Saw therapist (counselor) this morning - this was not a pleasant visit and pt had chest pain while driving home this morning. Pt's wife notes that she is concerned that pt may be having thoughts of ending her life. Pt denies that she has any suicidal intention or plan, but \"I don't want to be here right now\", she notes that she doesn't want to get out of bed, nothing that \"life is too confusing right now\". She has had some improvement in depression/anxiety with Zoloft 50 mg x 2.5 weeks and would like to try increasing the dose. Review of Systems   Constitutional: Positive for malaise/fatigue. Respiratory: Negative for shortness of breath. Cardiovascular: Negative for chest pain and palpitations. No current chest pain/SOB/palpitations. Neurological: Negative for focal weakness. Psychiatric/Behavioral: Positive for depression. Negative for substance abuse and suicidal ideas. The patient is nervous/anxious. Physical Exam   Constitutional: She is oriented to person, place, and time. She appears well-developed and well-nourished. No distress. HENT:   Head: Normocephalic and atraumatic. Neck: Neck supple. No JVD present. Cardiovascular: Normal rate, regular rhythm and normal heart sounds. Pulmonary/Chest: Effort normal and breath sounds normal. No respiratory distress. Musculoskeletal: She exhibits no edema. Neurological: She is alert and oriented to person, place, and time. Skin: Skin is warm and dry. Psychiatric: Her behavior is normal. Judgment and thought content normal.   Slightly anxious mood. Nursing note and vitals reviewed.       ASSESSMENT and PLAN ICD-10-CM ICD-9-CM    1. Anxiety F41.9 300.00 REFERRAL TO SOCIAL WORK      REFERRAL TO SOCIAL WORK      Increase dose to: sertraline (ZOLOFT) 100 mg tablet    Long discussion with pt and wife about going to [facility] ER if SI develops. They agree. Chest pain sounds non-cardiac as it started at rest this morning with significant emotional stress. As pt has studies scheduled at 2 PM this afternoon, I encouraged pt to attend this testing appointment, but go to ER if exertional chest pain develops or sx change. Pt and her wife agree. 2. Elevated BP without diagnosis of hypertension R03.0 796.2 Resolved at recheck.

## 2017-09-22 ENCOUNTER — OFFICE VISIT (OUTPATIENT)
Dept: NEUROLOGY | Age: 42
End: 2017-09-22

## 2017-09-22 VITALS
HEART RATE: 78 BPM | RESPIRATION RATE: 16 BRPM | DIASTOLIC BLOOD PRESSURE: 80 MMHG | SYSTOLIC BLOOD PRESSURE: 118 MMHG | OXYGEN SATURATION: 100 % | BODY MASS INDEX: 27.16 KG/M2 | WEIGHT: 183.4 LBS | HEIGHT: 69 IN

## 2017-09-22 DIAGNOSIS — G45.1 HEMISPHERIC CAROTID ARTERY SYNDROME: Primary | ICD-10-CM

## 2017-09-22 DIAGNOSIS — M21.372 FOOT DROP, LEFT: ICD-10-CM

## 2017-09-22 NOTE — MR AVS SNAPSHOT
Visit Information Date & Time Provider Department Dept. Phone Encounter #  
 9/22/2017  3:00  [facility], [address] [facility] at [address] [id number] Follow-up Instructions Return if symptoms worsen or fail to improve. Routing History Your Appointments 10/4/2017  3:00 PM  
ESTABLISHED PATIENT with Deirdre Gregory MD  
[address] [facility]) Appt Note: 3 WEEKS FOLLOWING ECHO HOLTER MONITOR  
 [address] 
[address] [place]  
[phone] [address] Upcoming Health Maintenance Date Due INFLUENZA AGE 9 TO ADULT 8/1/2017 PAP AKA CERVICAL CYTOLOGY 2/22/2019 DTaP/Tdap/Td series (2 - Td) 2/22/2026 Allergies as of 9/22/2017  Review Complete On: 9/22/2017 By: Mago Evans LPN Severity Noted Reaction Type Reactions Latex  02/03/2016    Rash  
 Sulfa (Sulfonamide Antibiotics) High 02/03/2016    Anaphylaxis Adhesive Tape-silicones  47/80/6707    Rash Morphine  02/03/2016    Rash Neosporin [Benzalkonium Chloride]  08/18/2016    Rash Current Immunizations  Reviewed on 2/23/2016 Name Date Influenza Vaccine (Quad) PF 2/3/2016 Pneumococcal Polysaccharide (PPSV-23) 9/1/2017 Tdap 2/22/2016 Not reviewed this visit You Were Diagnosed With   
  
 Codes Comments Hemispheric carotid artery syndrome    -  Primary ICD-10-CM: G45.1 ICD-9-CM: 435.8 Foot drop, left     ICD-10-CM: M21.372 ICD-9-CM: 736.79 Vitals BP Pulse Resp Height(growth percentile) Weight(growth percentile) LMP  
 118/80 78 16 5' 9\" (1.753 m) 183 lb 6.4 oz (83.2 kg) 03/18/2016 SpO2 BMI OB Status Smoking Status 100% 27.08 kg/m2 Hysterectomy Former Smoker Vitals History BMI and BSA Data Body Mass Index Body Surface Area  27.08 kg/m 2 2.01 m 2  
  
  
 Preferred Pharmacy Pharmacy Name Phone [id number] [place], [place] - [address] AT [facility] [phone] Your Updated Medication List  
  
   
This list is accurate as of: 9/22/17  3:34 PM.  Always use your most recent med list.  
  
  
  
  
 cholecalciferol 1,000 unit tablet Commonly known as:  VITAMIN D3 Take 1 Tab by mouth daily. hydroCHLOROthiazide 12.5 mg tablet Commonly known as:  HYDRODIURIL Take 1 Tab by mouth daily. LORazepam 1 mg tablet Commonly known as:  ATIVAN Take 1 Tab by mouth two (2) times a day. Max Daily Amount: 2 mg. Prn anxiety  
  
 sertraline 100 mg tablet Commonly known as:  ZOLOFT Take 1 Tab by mouth daily. ZYRTEC PO Take  by mouth as needed. We Performed the Following REFERRAL TO PHYSICAL THERAPY [YQB36 Custom] Comments:  
 Left AFO fitting Follow-up Instructions Return if symptoms worsen or fail to improve. To-Do List   
 09/22/2017 Imaging:  MRA BRAIN WO CONT   
  
 09/22/2017 Imaging:  MRI BRAIN WO CONT   
  
 12/19/2017 8:30 AM  
  Appointment with [place] [address] at [facility] ([phone]) Shower or bathe using soap and water. Do not use deodorant, powder, perfumes, or lotion the day of your exam.  If your prior mammograms were not performed at [facility] please bring films with you or forward prior images 2 days before your procedure. If patient is not a callback diagnostic, the patient must have an order/script from the physician for the diagnostic. Please bring it on the day of the mammogram or have it faxed to the department. [facility]  [phone] [facility] [phone] [place]  [phone] [facility] [phone] [facility] [phone] [facility] [phone] Tiffany Ro [phone]  
  
 12/19/2017 9:00 AM  
  Appointment with [address] [place] [address] at [facility] ([phone]) Shower or bathe using soap and water. Do not use deodorant, powder, perfumes, or lotion. If your prior films were not performed at a local [facility] facility please bring or forward prior images 2 days before procedure. Referral Information Referral ID Referred By Referred To  
  
 [id number] Carley Galdamez Not Available Visits Status Start Date End Date 1 New Request 9/22/17 9/22/18 If your referral has a status of pending review or denied, additional information will be sent to support the outcome of this decision. Patient Instructions Learning About Transient Ischemic Attack (TIA) What is a TIA? A transient ischemic attack (TIA) means that the blood flow to a part of the brain is blocked for a short time. A TIA feels like a stroke but usually lasts only 10 to 20 minutes. Unlike a stroke, a TIA does not cause lasting brain damage. A TIA is usually caused by a blood clot that blocks blood flow in the brain. A blood clot can form in another part of the body (often the heart) and travel through the bloodstream to the brain. When blood flow to part of the brain is blocked, the brain cells in that area are affected within seconds. This causes symptoms in parts of the body controlled by those brain cells. When the blood clot dissolves, blood flow returns, and the symptoms go away. Blood clots can be the result of hardening of the arteries (atherosclerosis) or a heart attack. Sometimes a TIA is caused by a sharp drop in blood pressure that reduces blood flow to the brain. This is called a \"low-flow\" TIA. It is not as common as a TIA caused by a blood clot. What happens after a TIA? TIA is a serious warning sign of a possible stroke in the future. If you have other medical conditions such as coronary artery disease or atherosclerosis, you may also have an increased risk for a heart attack. Talk to your doctor about your risk.  Understanding your risk will help you and your doctor plan your treatment options. You can do a lot to lower your chance of having another TIA or a stroke. Medicines can help, and you may also need to make lifestyle changes. What are the symptoms? Symptoms of a TIA are the same as symptoms of a stroke. But symptoms of a TIA don't last very long. Most of the time, they go away in 10 to 20 minutes. They may include: 
· Sudden numbness, tingling, weakness, or loss of movement in your face, arm, or leg, especially on only one side of your body. · Sudden vision changes. · Sudden trouble speaking. · Sudden confusion or trouble understanding simple statements. · Sudden problems with walking or balance. If you have any of these symptoms, call 911 or other emergency services right away. Ask your family, friends, and coworkers to learn the signs of a TIA. They may notice these signs before you do. Make sure they know to call 911 if these signs appear. How is a TIA treated? If you've had a TIA, you may need more testing and treatment after you get checked by your doctor. If you have a high risk of stroke, you may have to stay in the hospital for treatment. Your treatment for a TIA may include taking medicines to prevent blood clots or a stroke, or having surgery to reopen narrow arteries. How can you prevent another TIA? · Work with your doctor to treat any health problems you have. High blood pressure, high cholesterol, atrial fibrillation, and diabetes all raise your chances of having a stroke. · Be safe with medicines. Take your medicine exactly as prescribed. Call your doctor if you think you are having a problem with your medicine. · Have a healthy lifestyle. ¨ Do not smoke or allow others to smoke around you. If you need help quitting, talk to your doctor about stop-smoking programs and medicines. These can increase your chances of quitting for good. Smoking makes a stroke more likely. ¨ Limit alcohol to 2 drinks a day for men and 1 drink a day for women. ¨ Lose weight if you need to. A healthy weight will help you keep your heart and body healthy. ¨ Be active. Ask your doctor what type and level of activity are safe for you. ¨ Eat heart-healthy foods, like fruits, vegetables, and high-fiber foods. Follow-up care is a key part of your treatment and safety. Be sure to make and go to all appointments, and call your doctor if you are having problems. It's also a good idea to know your test results and keep a list of the medicines you take. Where can you learn more? Go to http://demarco-max.info/. Enter X371 in the search box to learn more about \"Learning About Transient Ischemic Attack (TIA). \" 
Current as of: March 20, 2017 Content Version: 11.3 © 5846-9675 [employer]. Care instructions adapted under license by [employer] (which disclaims liability or warranty for this information). If you have questions about a medical condition or this instruction, always ask your healthcare professional. [address] any warranty or liability for your use of this information. Introducing \[facility]\"" & HEALTH SERVICES! Dear Veronika Harman: 
Thank you for requesting a [employer] account. Our records indicate that you already have an active [employer] account. You can access your account anytime at [email or web address]. [employer] [facility] [place]/[employer] Did you know that you can access your hospital and ER discharge instructions at any time in [employer]? You can also review all of your test results from your hospital stay or ER visit. Additional Information If you have questions, please visit the Frequently Asked Questions section of the [employer] website at [email or web address]. [employer] [place]/. Remember, [employer] is NOT to be used for urgent needs. For medical emergencies, dial 911. Now available from your iPhone and Android! Please provide this summary of care documentation to your next provider. Your primary care clinician is listed as Ofelia Mcfadden. If you have any questions after today's visit, please call [phone].

## 2017-09-22 NOTE — PROGRESS NOTES
Chief Complaint   Patient presents with    TIA     eval       Referred by: NP Skiff      [facility]    Taylor Miles is a 50-year-old  here for various symptoms. 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 She recalls sitting at her desk suddenly feeling diaphoretic with some lightheadedness. She began to feel palpitations and then had tunnel vision. That is the last thing she clearly recalls. Her next clear memory is waking up at her desk. She was not confused. No incontinence or tongue biting. A few days prior she had a similar event but did not lose consciousness. At the same time prior to the syncopal event, she had an episode of word finding difficulty and imbalance when she got home one evening. By the next morning the symptoms had gone. She also has chronic mild bifrontal headaches usually worse at the end of the day. No migrainous symptoms. She is undergoing cardiac workup currently for the syncopal event. She is a history of hypertension. She is a non-smoker. She has a history of lumbar disc disease and surgeries. She has residual neuropathy of the left leg. Various family members on her father's side had heart disease at young ages. Review of Systems   Neurological: Positive for speech change and loss of consciousness. Imbalance   All other systems reviewed and are negative.       Past Medical History:   Diagnosis Date    Adverse effect of anesthesia     hard to wake up    Anxiety     Arthritis     back    Asperger syndrome     Asthma     Hypercholesterolemia     IBD (inflammatory bowel disease)     Ill-defined condition     discomfort hips shoulders knees and feet and hands MAURICIO was positive    Irritable bowel disease 1995    Psychiatric disorder     anxiety and depression     Family History   Problem Relation Age of Onset    Cancer Mother     Heart Disease Father  Depression Father     Cancer Sister     Alzheimer Maternal Grandmother     Diabetes Maternal Grandfather     Heart Surgery Paternal Grandmother     Heart Surgery Paternal Grandfather      Social History     Social History    Marital status:      Spouse name: N/A    Number of children: N/A    Years of education: N/A     Occupational History     9th and 12th grade      Social History Main Topics    Smoking status: Former Smoker     Packs/day: 1.00     Years: 10.00     Quit date: 4/7/2004    Smokeless tobacco: Never Used    Alcohol use No    Drug use: Yes     Special: Marijuana      Comment: 2x/week     Sexual activity: Yes     Partners: Female     Birth control/ protection: None     Other Topics Concern    Not on file     Social History Narrative    Lives with wife.  in 2013. Together since 2004.     1 cat. Current Outpatient Prescriptions   Medication Sig    sertraline (ZOLOFT) 100 mg tablet Take 1 Tab by mouth daily.  CETIRIZINE HCL (ZYRTEC PO) Take  by mouth as needed.  cholecalciferol (VITAMIN D3) 1,000 unit tablet Take 1 Tab by mouth daily.  hydroCHLOROthiazide (HYDRODIURIL) 12.5 mg tablet Take 1 Tab by mouth daily.  LORazepam (ATIVAN) 1 mg tablet Take 1 Tab by mouth two (2) times a day. Max Daily Amount: 2 mg. Prn anxiety     No current facility-administered medications for this visit. Allergies   Allergen Reactions    Latex Rash    Sulfa (Sulfonamide Antibiotics) Anaphylaxis    Adhesive Tape-Silicones Rash    Morphine Rash    Neosporin [Benzalkonium Chloride] Rash         Neurologic Exam     Mental Status   Oriented to person, place, and time. Attention: normal.   Speech: speech is normal   Level of consciousness: alert    Cranial Nerves   Cranial nerves II through XII intact. Motor Exam   Muscle bulk: normal  Overall muscle tone: normal    Strength   Strength 5/5 throughout.         Left foot drop noted  Left dorsiflexion 4+     Sensory Exam   Light touch normal.   Sensory deficit distribution on left: non-dermatomal distribution    Gait, Coordination, and Reflexes     Gait  Gait: normal    Tremor   Resting tremor: absent    Reflexes   Right biceps: 2+  Left biceps: 2+  Right patellar: 2+  Left patellar: 1+    Physical Exam   Constitutional: She is oriented to person, place, and time. She appears well-developed and well-nourished. Cardiovascular: Normal rate. Pulmonary/Chest: Effort normal.   Neurological: She is oriented to person, place, and time. She has normal strength. Gait normal.   Reflex Scores:       Bicep reflexes are 2+ on the right side and 2+ on the left side. Patellar reflexes are 2+ on the right side and 1+ on the left side. Skin: Skin is warm and dry. Psychiatric: She has a normal mood and affect. Her speech is normal and behavior is normal.   Vitals reviewed. Visit Vitals    /80    Pulse 78    Resp 16    Ht 5' 9\" (1.753 m)    Wt 83.2 kg (183 lb 6.4 oz)    LMP 03/18/2016    SpO2 100%    BMI 27.08 kg/m2       Lab Results  Component Value Date/Time   WBC 8.2 09/01/2017 04:18 PM   HGB 14.6 09/01/2017 04:18 PM   HCT 43.6 09/01/2017 04:18 PM   PLATELET 620 96/06/9535 04:18 PM   MCV 89 09/01/2017 04:18 PM     Lab Results  Component Value Date/Time   Glucose 90 09/01/2017 04:18 PM   LDL, calculated 134 09/01/2017 04:18 PM   Creatinine 0.73 09/01/2017 04:18 PM      Lab Results  Component Value Date/Time   Cholesterol, total 203 09/01/2017 04:18 PM   HDL Cholesterol 45 09/01/2017 04:18 PM   LDL, calculated 134 09/01/2017 04:18 PM   Triglyceride 122 09/01/2017 04:18 PM   Lab Results  Component Value Date/Time   ALT (SGPT) 79 09/01/2017 04:18 PM   AST (SGOT) 39 09/01/2017 04:18 PM   GGT 42 11/07/2016 09:05 AM   Alk.  phosphatase 70 09/01/2017 04:18 PM   Bilirubin, direct 0.14 12/21/2016 12:00 AM   Bilirubin, total 1.4 09/01/2017 04:18 PM   Albumin 4.8 09/01/2017 04:18 PM   Protein, total 7.1 09/01/2017 04:18 PM   INR 1.0 12/01/2011 07:30 AM   Prothrombin time 12.3 12/01/2011 07:30 AM   PLATELET 758 72/31/1651 04:18 PM                      Assessment and Plan Diagnoses and all orders for this visit:    1. Hemispheric carotid artery syndrome  -     MRI BRAIN WO CONT; Future  -     MRA BRAIN WO CONT; Future    2. Foot drop, left  -     REFERRAL TO PHYSICAL THERAPY      42-year-old woman who had an episode of word finding difficulty and imbalance that could be indicative for a TIA. She does have risk factors for stroke so I am going to proceed with an MRI and MRA of the brain. Noncontrast studies. She should start taking aspirin 81 mg daily for stroke prevention. Complete the cardiac workup ongoing. They are completing an echo. The syncopal event sounds purely syncopal.  It does not sound epileptiform. Would not pursue epilepsy workup. Her results will be available in her online medical record to review. I will review the MRI results when done in place a comment. No need follow-up unless she develops new symptoms. She does have recurrent tension type headaches. If those become debilitating she should follow-up to discuss those. I am sending her to physical therapy for evaluation for an AFO to the left foot. She has mild foot drop and is a falls risk. Suspect this is due to her history of lumbar disc disease. A notice of this visit/encounter being completed has been sent electronically to the patient's PCP and/or referring provider.      [address] [place] [address], [facility] Joaquim Machucaomatdemian WILBURN

## 2017-09-22 NOTE — PATIENT INSTRUCTIONS
Learning About Transient Ischemic Attack (TIA)  What is a TIA? A transient ischemic attack (TIA) means that the blood flow to a part of the brain is blocked for a short time. A TIA feels like a stroke but usually lasts only 10 to 20 minutes. Unlike a stroke, a TIA does not cause lasting brain damage. A TIA is usually caused by a blood clot that blocks blood flow in the brain. A blood clot can form in another part of the body (often the heart) and travel through the bloodstream to the brain. When blood flow to part of the brain is blocked, the brain cells in that area are affected within seconds. This causes symptoms in parts of the body controlled by those brain cells. When the blood clot dissolves, blood flow returns, and the symptoms go away. Blood clots can be the result of hardening of the arteries (atherosclerosis) or a heart attack. Sometimes a TIA is caused by a sharp drop in blood pressure that reduces blood flow to the brain. This is called a \"low-flow\" TIA. It is not as common as a TIA caused by a blood clot. What happens after a TIA? TIA is a serious warning sign of a possible stroke in the future. If you have other medical conditions such as coronary artery disease or atherosclerosis, you may also have an increased risk for a heart attack. Talk to your doctor about your risk. Understanding your risk will help you and your doctor plan your treatment options. You can do a lot to lower your chance of having another TIA or a stroke. Medicines can help, and you may also need to make lifestyle changes. What are the symptoms? Symptoms of a TIA are the same as symptoms of a stroke. But symptoms of a TIA don't last very long. Most of the time, they go away in 10 to 20 minutes. They may include:  · Sudden numbness, tingling, weakness, or loss of movement in your face, arm, or leg, especially on only one side of your body. · Sudden vision changes. · Sudden trouble speaking.   · Sudden confusion or trouble understanding simple statements. · Sudden problems with walking or balance. If you have any of these symptoms, call 911 or other emergency services right away. Ask your family, friends, and coworkers to learn the signs of a TIA. They may notice these signs before you do. Make sure they know to call 911 if these signs appear. How is a TIA treated? If you've had a TIA, you may need more testing and treatment after you get checked by your doctor. If you have a high risk of stroke, you may have to stay in the hospital for treatment. Your treatment for a TIA may include taking medicines to prevent blood clots or a stroke, or having surgery to reopen narrow arteries. How can you prevent another TIA? · Work with your doctor to treat any health problems you have. High blood pressure, high cholesterol, atrial fibrillation, and diabetes all raise your chances of having a stroke. · Be safe with medicines. Take your medicine exactly as prescribed. Call your doctor if you think you are having a problem with your medicine. · Have a healthy lifestyle. ¨ Do not smoke or allow others to smoke around you. If you need help quitting, talk to your doctor about stop-smoking programs and medicines. These can increase your chances of quitting for good. Smoking makes a stroke more likely. ¨ Limit alcohol to 2 drinks a day for men and 1 drink a day for women. ¨ Lose weight if you need to. A healthy weight will help you keep your heart and body healthy. ¨ Be active. Ask your doctor what type and level of activity are safe for you. ¨ Eat heart-healthy foods, like fruits, vegetables, and high-fiber foods. Follow-up care is a key part of your treatment and safety. Be sure to make and go to all appointments, and call your doctor if you are having problems. It's also a good idea to know your test results and keep a list of the medicines you take. Where can you learn more?   Go to http://talisha.info/. Enter X675 in the search box to learn more about \"Learning About Transient Ischemic Attack (TIA). \"  Current as of: March 20, 2017  Content Version: 11.3  © 8292-0145 [employer]. Care instructions adapted under license by [employer] (which disclaims liability or warranty for this information). If you have questions about a medical condition or this instruction, always ask your healthcare professional. [address] any warranty or liability for your use of this information.

## 2017-09-25 NOTE — PROGRESS NOTES
Dear Ms. Chetna Dye echo looks entirely normal with good heart function and valves. You have a slightly thickened heart muscle, which can be seen with high blood pressure over time. But there is no indication from your echo that the fainting you are experiencing is related to your heart. The results from your holter monitor are still pending. You should follow up with your PCP to monitor for any blood pressure issues -- which can be provoked by stress. And good stress management will be essential.     We look forward to seeing you at your follow up visit next week to discuss these results further and determine what next steps we should take.     Thank you,  Minna Rosas PA-C

## 2017-09-30 DIAGNOSIS — R60.9 PERIPHERAL EDEMA: ICD-10-CM

## 2017-10-02 RX ORDER — HYDROCHLOROTHIAZIDE 12.5 MG/1
TABLET ORAL
Qty: 30 TAB | Refills: 0 | Status: SHIPPED | OUTPATIENT
Start: 2017-10-02 | End: 2017-10-04 | Stop reason: ALTCHOICE

## 2017-10-04 ENCOUNTER — OFFICE VISIT (OUTPATIENT)
Dept: CARDIOLOGY CLINIC | Age: 42
End: 2017-10-04

## 2017-10-04 VITALS
WEIGHT: 182 LBS | SYSTOLIC BLOOD PRESSURE: 113 MMHG | OXYGEN SATURATION: 98 % | HEART RATE: 78 BPM | HEIGHT: 69 IN | BODY MASS INDEX: 26.96 KG/M2 | DIASTOLIC BLOOD PRESSURE: 82 MMHG

## 2017-10-04 DIAGNOSIS — I11.9 HYPERTENSIVE HEART DISEASE WITHOUT HEART FAILURE: Primary | ICD-10-CM

## 2017-10-04 DIAGNOSIS — R55 VASOVAGAL SYNCOPE: ICD-10-CM

## 2017-10-04 DIAGNOSIS — I51.89 DIASTOLIC DYSFUNCTION: ICD-10-CM

## 2017-10-04 NOTE — PATIENT INSTRUCTIONS
I have printed some information about the DASH diet and lifestyle. <Much of this may be material you already are using. Please stop the hydrochlorothoiazide. It may be related to your fainting and it can make hypertrophic changes in the heart get worse with age. If your blood pressure creeps up we can address it right away by telephone. Please call with further questions.  Your symptoms may get better simply with stopping the hydrochlorothiazide, but if they persist or get worse please call    The reference materials  for blood pressure activation and provocation of hypertrophic changes in the heart are as follows       R: renin   A: aldosterone   A Angiotensin (this one is the culprit in early hypertrophy)   S: system

## 2017-10-04 NOTE — Clinical Note
Despite her young age, this patient has early LV diastolic dysfunction. Hydrochlorothiazide can make this progressively worse by triggering angiotensin production, and it can also predispose to postural syncope by raul plasma volume. I stopped it without substitution with a plan to observe the blood pressure and remain open for questions or future medication selection. Thank you very much for this interesting referral.  We will be happy to reevaluate anytime you wish.  Felisha Mark

## 2017-10-04 NOTE — MR AVS SNAPSHOT
Visit Information Date & Time Provider Department Dept. Phone Encounter #  
 10/4/2017  3:00 PM Yehuda Reyes MD [facility] [place] [id number] Upcoming Health Maintenance Date Due INFLUENZA AGE 9 TO ADULT 8/1/2017 PAP AKA CERVICAL CYTOLOGY 2/22/2019 DTaP/Tdap/Td series (2 - Td) 2/22/2026 Allergies as of 10/4/2017  Review Complete On: 9/22/2017 By: [facility],  Severity Noted Reaction Type Reactions Latex  02/03/2016    Rash  
 Sulfa (Sulfonamide Antibiotics) High 02/03/2016    Anaphylaxis Adhesive Tape-silicones  53/45/6880    Rash Morphine  02/03/2016    Rash Neosporin [Benzalkonium Chloride]  08/18/2016    Rash Current Immunizations  Reviewed on 2/23/2016 Name Date Influenza Vaccine (Quad) PF 2/3/2016 Pneumococcal Polysaccharide (PPSV-23) 9/1/2017 Tdap 2/22/2016 Not reviewed this visit Vitals BP Pulse Height(growth percentile) Weight(growth percentile) LMP SpO2  
 113/82 78 5' 9\" (1.753 m) 182 lb (82.6 kg) 03/18/2016 98% BMI OB Status Smoking Status 26.88 kg/m2 Hysterectomy Former Smoker Vitals History BMI and BSA Data Body Mass Index Body Surface Area  
 26.88 kg/m 2 2.01 m 2 Preferred Pharmacy Pharmacy Name Phone [id number] [address] AT [address] [place] [address] [phone] Your Updated Medication List  
  
   
This list is accurate as of: 10/4/17  4:45 PM.  Always use your most recent med list.  
  
  
  
  
 cholecalciferol 1,000 unit tablet Commonly known as:  VITAMIN D3 Take 1 Tab by mouth daily. LORazepam 1 mg tablet Commonly known as:  ATIVAN Take 1 Tab by mouth two (2) times a day. Max Daily Amount: 2 mg. Prn anxiety  
  
 sertraline 100 mg tablet Commonly known as:  ZOLOFT Take 1 Tab by mouth daily. ZYRTEC PO Take  by mouth as needed. To-Do List   
 10/06/2017 4:15 PM  
(Arrive by 4:00 PM) Appointment with [facility] MRI 1 at [facility] [address] ([phone]) 1. Please bring a list or a bag of your current medications to your appointment 2. Please be sure to remove ALL hair clips, pins, extensions, etc., prior to arriving for your MRI procedure. 3. If you have any medical implants or devices, please bring associated medical card with you. 4. Bring any non [facility] films or CDs pertaining to the area being imaged with you on the day of appointment. 5. A written order with a valid diagnosis and Physicians  signature is required for all scheduled tests. 6. Check in at registration 30min before your appointment time unless you were instructed to do otherwise. [place] patients, please arrive 15 minutes prior to appointment for registration. 10/06/2017 5:00 PM  
(Arrive by 4:45 PM) Appointment with [facility] MRI 1 at [facility] [address] ([phone]) 1. Please bring a list or a bag of your current medications to your appointment 2. Please be sure to remove ALL hair clips, pins, extensions, etc., prior to arriving for your MRI procedure. 3. If you have any medical implants or devices, please bring associated medical card with you. 4. Bring any non [facility] films or CDs pertaining to the area being imaged with you on the day of appointment. 5. A written order with a valid diagnosis and Physicians  signature is required for all scheduled tests. 6. Check in at registration 30min before your appointment time unless you were instructed to do otherwise. [place] patients, please arrive 15 minutes prior to appointment for registration. 12/19/2017 8:30 AM  
  Appointment with [facility] 1 at [facility] ([phone]) Shower or bathe using soap and water.   Do not use deodorant, powder, perfumes, or lotion the day of your exam.  If your prior mammograms were not performed at [facility] 6 please bring films with you or forward prior images 2 days before your procedure. If patient is not a callback diagnostic, the patient must have an order/script from the physician for the diagnostic. Please bring it on the day of the mammogram or have it faxed to the department. [facility]  [phone] [facility] [phone] SANDR[place]  [id number] [address] [phone] [facility] [phone] [facility] [phone] Kevannini Ledesma [phone]  
  
 12/19/2017 9:00 AM  
  Appointment with [address] [place] [address] at [facility] ([phone]) Shower or bathe using soap and water. Do not use deodorant, powder, perfumes, or lotion. If your prior films were not performed at a local [facility] facility please bring or forward prior images 2 days before procedure. Patient Instructions I have printed some information about the DASH diet and lifestyle. <Much of this may be material you already are using. Please stop the hydrochlorothoiazide. It may be related to your fainting and it can make hypertrophic changes in the heart get worse with age. If your blood pressure creeps up we can address it right away by telephone. Please call with further questions. Your symptoms may get better simply with stopping the hydrochlorothiazide, but if they persist or get worse please call The reference materials  for blood pressure activation and provocation of hy[pertrophic changes in the heart are as follows R: renin A: aldosterone A Angiotensin (this one is the culprit in early hypertrophy) S: system Introducing [facility]! Dear Moe Hernandez: 
Thank you for requesting a [employer] account. Our records indicate that you already have an active Amed[employer] account. You can access your account anytime at [email or web address] Did you know that you can access your hospital and ER discharge instructions at any time in ARPU? You can also review all of your test results from your hospital stay or ER visit. Additional Information If you have questions, please visit the Frequently Asked Questions section of the ARPU website at [email or web address] Remember, ARPU is NOT to be used for urgent needs. For medical emergencies, dial 911. Now available from your iPhone and Android! Please provide this summary of care documentation to your next provider. Your primary care clinician is listed as Braydon Jean. If you have any questions after today's visit, please call [phone].

## 2017-10-13 ENCOUNTER — TELEPHONE (OUTPATIENT)
Dept: NEUROLOGY | Age: 42
End: 2017-10-13

## 2017-10-13 NOTE — TELEPHONE ENCOUNTER
----- Message from Igor Wharton DO sent at 10/13/2017  3:38 PM EDT -----  Regarding: RE: P2P Needed  Contact: [phone]  I received word of denial on October 4. Apparently the case was closed on October 5 prior to allowing enough time to even appeal.  We are going to resubmit request.  Patient suffered a TIA and requires MRI brain and MRA of the head. SOLE Lipscomb let the patient know where still working on this.  ----- Message -----     From: Narciso Gramajo LPN     Sent: 37/8/6700   2:29 PM       To: Igor Wharton DO  Subject: FW: P2P Needed                                       ----- Message -----     From: Julissa Galeano     Sent: 10/4/2017  10:10 AM       To: Dustin Oliver LPN  Subject: W6T Needed                                       Good Morning,    This patient's tests are requiring a p2p:    Per AIM website Senia Penn is pending for cpt [id number] & [id number], ordering  MD office needs to call [facility] @ [phone] to speak with one of their MD reviewers regarding the studies. .    Please let me know if the doctor is able to do these, otherwise the patient will be canceled/recheduled. Thank you.

## 2017-10-18 ENCOUNTER — OFFICE VISIT (OUTPATIENT)
Dept: INTERNAL MEDICINE CLINIC | Facility: CLINIC | Age: 42
End: 2017-10-18

## 2017-10-18 VITALS
WEIGHT: 184.9 LBS | HEIGHT: 69 IN | TEMPERATURE: 97.8 F | SYSTOLIC BLOOD PRESSURE: 128 MMHG | HEART RATE: 71 BPM | OXYGEN SATURATION: 98 % | RESPIRATION RATE: 18 BRPM | DIASTOLIC BLOOD PRESSURE: 82 MMHG | BODY MASS INDEX: 27.39 KG/M2

## 2017-10-18 DIAGNOSIS — M54.50 ACUTE RIGHT-SIDED LOW BACK PAIN WITHOUT SCIATICA: ICD-10-CM

## 2017-10-18 DIAGNOSIS — E78.5 HYPERLIPIDEMIA, UNSPECIFIED HYPERLIPIDEMIA TYPE: ICD-10-CM

## 2017-10-18 DIAGNOSIS — I47.1 SVT (SUPRAVENTRICULAR TACHYCARDIA) (HCC): ICD-10-CM

## 2017-10-18 DIAGNOSIS — F41.9 ANXIETY: Primary | ICD-10-CM

## 2017-10-18 RX ORDER — ASPIRIN 81 MG/1
TABLET ORAL DAILY
COMMUNITY

## 2017-10-18 RX ORDER — SERTRALINE HYDROCHLORIDE 100 MG/1
100 TABLET, FILM COATED ORAL DAILY
Qty: 30 TAB | Refills: 5 | Status: SHIPPED | OUTPATIENT
Start: 2017-10-18 | End: 2017-11-29 | Stop reason: DRUGHIGH

## 2017-10-18 RX ORDER — DICLOFENAC SODIUM 10 MG/G
4 GEL TOPICAL 4 TIMES DAILY
Qty: 100 G | Refills: 2 | Status: SHIPPED | OUTPATIENT
Start: 2017-10-18 | End: 2017-11-01 | Stop reason: ALTCHOICE

## 2017-10-18 RX ORDER — SERTRALINE HYDROCHLORIDE 50 MG/1
50 TABLET, FILM COATED ORAL DAILY
Qty: 30 TAB | Refills: 5 | Status: SHIPPED | OUTPATIENT
Start: 2017-10-18 | End: 2017-11-29 | Stop reason: DRUGHIGH

## 2017-10-18 NOTE — PROGRESS NOTES
Subjective:      Serina Prader is a 40 y.o. female who presents today for follow up on. Cardiovascular Review  The patient has SVT. Since last visit: she has seen Dr. Josy Do, he diagnosed her with SVT, took her off HCTZ, she has had echo and halter tests. She reports no TIA's, no chest pain on exertion, no dyspnea on exertion, no swelling of ankles, no palpitations. Diet and Lifestyle: generally follows a low fat low cholesterol diet, generally follows a low sodium diet, no formal exercise but active during the day. Labs: no lab studies available for review at time of visit. She has not passed out since her last visit with me. Neurological Review  She is here today to talk about TIA/syncope. This is a follow up problem, she has seen Dr. Billie Perez and has an MRI pending. She denies any episodes of syncope since last visit. Mental Health Review  Patient is seen for anxiety disorder. Since last visit: she has started seeing a therapist, she states anxiety is better controlled but the zoloft is not working as well as it has in the past. She states the medication changes give her improved symptoms for 2 weeks but then if feels like the zoloft is wearing off. Ongoing symptoms include: racing thoughts, racing heart, mood changes. She denies: SI/SA. Reported side effects from the treatment: none. She is now seeing KORY Sanford. Weight loss goals:  1. Meal and exercise plan  2. Going to the gym 3 times weekly for an hour  3. 5,000 steps daily  4. Identifying restaurants where she can get healthy options  5.  Get a fit bit    Patient Active Problem List    Diagnosis Date Noted    Elevated BP without diagnosis of hypertension 09/18/2017    Hyperbilirubinemia 09/05/2017    Elevated ALT measurement 09/05/2017    Family history of exposure to Agent Orange 12/21/2016    Cellulitis of left upper extremity 08/19/2016    Arthralgia 08/19/2016    Positive MAURICIO (antinuclear antibody) 08/19/2016  Hyperlipidemia 08/19/2016    Vitamin D deficiency 08/19/2016    Anxiety 08/19/2016    DDD (degenerative disc disease), lumbar 08/19/2016    Colitis 08/19/2016    Mild intermittent asthma without complication 28/41/3111    Fibroids 03/23/2016     Current Outpatient Prescriptions   Medication Sig Dispense Refill    aspirin delayed-release 81 mg tablet Take  by mouth daily.  sertraline (ZOLOFT) 50 mg tablet Take 1 Tab by mouth daily. 30 Tab 5    sertraline (ZOLOFT) 100 mg tablet Take 1 Tab by mouth daily. 30 Tab 5    diclofenac (VOLTAREN) 1 % gel Apply 4 g to affected area four (4) times daily. 100 g 2    CETIRIZINE HCL (ZYRTEC PO) Take  by mouth as needed.  cholecalciferol (VITAMIN D3) 1,000 unit tablet Take 1 Tab by mouth daily. 60 Tab 11    LORazepam (ATIVAN) 1 mg tablet Take 1 Tab by mouth two (2) times a day. Max Daily Amount: 2 mg.  Prn anxiety 30 Tab 0     Allergies   Allergen Reactions    Latex Rash    Sulfa (Sulfonamide Antibiotics) Anaphylaxis    Adhesive Tape-Silicones Rash    Morphine Rash    Neosporin [Benzalkonium Chloride] Rash     Past Medical History:   Diagnosis Date    Adverse effect of anesthesia     hard to wake up    Anxiety     Arthritis     back    Asperger syndrome     Asthma     Hypercholesterolemia     IBD (inflammatory bowel disease)     Ill-defined condition     discomfort hips shoulders knees and feet and hands MAURICIO was positive    Irritable bowel disease 1995    Psychiatric disorder     anxiety and depression     Family History   Problem Relation Age of Onset    Cancer Mother     Heart Disease Father     Depression Father     Cancer Sister     Alzheimer Maternal Grandmother     Diabetes Maternal Grandfather     Heart Surgery Paternal Grandmother     Heart Surgery Paternal Grandfather      Social History   Substance Use Topics    Smoking status: Former Smoker     Packs/day: 1.00     Years: 10.00     Quit date: 4/7/2004    Smokeless tobacco: Never Used    Alcohol use No        Review of Systems    A comprehensive review of systems was negative except for that written in the HPI. Objective:     Visit Vitals    /82 (BP 1 Location: Left arm, BP Patient Position: Sitting)    Pulse 71    Temp 97.8 °F (36.6 °C) (Oral)    Resp 18    Ht 5' 9\" (1.753 m)    Wt 184 lb 14.4 oz (83.9 kg)    LMP 03/18/2016    SpO2 98%    BMI 27.3 kg/m2     General appearance: alert, cooperative, no distress, appears stated age  Head: Normocephalic, without obvious abnormality, atraumatic  Back: symmetric, no curvature. ROM normal. No CVA tenderness. , tender to right of lumbar spine with deep palpation  Lungs: clear to auscultation bilaterally  Heart: regular rate and rhythm, S1, S2 normal, no murmur, click, rub or gallop  Extremities: extremities normal, atraumatic, no cyanosis or edema  Pulses: 2+ and symmetric  Skin: Skin color, texture, turgor normal. No rashes or lesions  Neurologic: Alert and oriented X 3, normal strength and tone. Normal symmetric reflexes. Normal coordination and gait  Psych: appropriate mood, speech, affect  Nursing note and vitals reviewed  Assessment/Plan:       ICD-10-CM ICD-9-CM    1. Anxiety F41.9 300.00 REFERRAL TO PSYCHIATRY      sertraline (ZOLOFT) 50 mg tablet      sertraline (ZOLOFT) 100 mg tablet   2. Acute right-sided low back pain without sciatica M54.5 724.2 diclofenac (VOLTAREN) 1 % gel   3. Hyperlipidemia, unspecified hyperlipidemia type E78.5 272.4    4. SVT (supraventricular tachycardia) (HCC) I47.1 427.89    She requests a letter for work regarding how stressed she has been, letter printed. She will bring FMLA forms. Weight loss/health goals sent to her [facility]. Follow-up Disposition:  Return in about 6 weeks (around 11/29/2017) for Hypercholesterolemia and fasting labs.     Advised her to call back or return to office if symptoms worsen/change/persist.  Discussed expected course/resolution/complications of diagnosis in detail with patient. Medication risks/benefits/costs/interactions/alternatives discussed with patient. She was given an after visit summary which includes diagnoses, current medications, & vitals. She expressed understanding with the diagnosis and plan.

## 2017-10-18 NOTE — PATIENT INSTRUCTIONS
Starting a Weight Loss Plan: Care Instructions  Your Care Instructions  If you are thinking about losing weight, it can be hard to know where to start. Your doctor can help you set up a weight loss plan that best meets your needs. You may want to take a class on nutrition or exercise, or join a weight loss support group. If you have questions about how to make changes to your eating or exercise habits, ask your doctor about seeing a registered dietitian or an exercise specialist.  It can be a big challenge to lose weight. But you do not have to make huge changes at once. Make small changes, and stick with them. When those changes become habit, add a few more changes. If you do not think you are ready to make changes right now, try to pick a date in the future. Make an appointment to see your doctor to discuss whether the time is right for you to start a plan. Follow-up care is a key part of your treatment and safety. Be sure to make and go to all appointments, and call your doctor if you are having problems. Its also a good idea to know your test results and keep a list of the medicines you take. How can you care for yourself at home? · Set realistic goals. Many people expect to lose much more weight than is likely. A weight loss of 5% to 10% of your body weight may be enough to improve your health. · Get family and friends involved to provide support. Talk to them about why you are trying to lose weight, and ask them to help. They can help by participating in exercise and having meals with you, even if they may be eating something different. · Find what works best for you. If you do not have time or do not like to cook, a program that offers meal replacement bars or shakes may be better for you. Or if you like to prepare meals, finding a plan that includes daily menus and recipes may be best.  · Ask your doctor about other health professionals who can help you achieve your weight loss goals.   ¨ A dietitian can help you make healthy changes in your diet. ¨ An exercise specialist or  can help you develop a safe and effective exercise program.  ¨ A counselor or psychiatrist can help you cope with issues such as depression, anxiety, or family problems that can make it hard to focus on weight loss. · Consider joining a support group for people who are trying to lose weight. Your doctor can suggest groups in your area. Where can you learn more? Go to http://demarco-max.info/. Enter I784 in the search box to learn more about \"Starting a Weight Loss Plan: Care Instructions. \"  Current as of: October 13, 2016  Content Version: 11.3  © 2495-6422 [email or web address]. Care instructions adapted under license by [employer] (which disclaims liability or warranty for this information). If you have questions about a medical condition or this instruction, always ask your healthcare professional. [address] any warranty or liability for your use of this information. Learning About the [address] Diet  What is the Mediterranean diet? The Mediterranean diet is a style of eating rather than a diet plan. It features foods eaten in [place], [place], [place] and [place], and other countries along the [facility]. It emphasizes eating foods like fish, fruits, vegetables, beans, high-fiber breads and whole grains, nuts, and olive oil. This style of eating includes limited red meat, cheese, and sweets. Why choose the Mediterranean diet? A Mediterranean-style diet may improve heart health. It contains more fat than other heart-healthy diets. But the fats are mainly from nuts, unsaturated oils (such as fish oils and olive oil), and certain nut or seed oils (such as canola, soybean, or flaxseed oil). These fats may help protect the heart and blood vessels. How can you get started on the Mediterranean diet?   Here are some things you can do to switch to a more Mediterranean way of eating. What to eat  · Eat a variety of fruits and vegetables each day, such as grapes, blueberries, tomatoes, broccoli, peppers, figs, olives, spinach, eggplant, beans, lentils, and chickpeas. · Eat a variety of whole-grain foods each day, such as oats, brown rice, and whole wheat bread, pasta, and couscous. · Eat fish at least 2 times a week. Try tuna, salmon, mackerel, lake trout, herring, or sardines. · Eat moderate amounts of low-fat dairy products, such as milk, cheese, or yogurt. · Eat moderate amounts of poultry and eggs. · Choose healthy (unsaturated) fats, such as nuts, olive oil, and certain nut or seed oils like canola, soybean, and flaxseed. · Limit unhealthy (saturated) fats, such as butter, palm oil, and coconut oil. And limit fats found in animal products, such as meat and dairy products made with whole milk. Try to eat red meat only a few times a month in very small amounts. · Limit sweets and desserts to only a few times a week. This includes sugar-sweetened drinks like soda. The Mediterranean diet may also include red wine with your meal--1 glass each day for women and up to 2 glasses a day for men. Tips for eating at home  · Use herbs, spices, garlic, lemon zest, and citrus juice instead of salt to add flavor to foods. · Add avocado slices to your sandwich instead of mayer. · Have fish for lunch or dinner instead of red meat. Brush the fish with olive oil, and broil or grill it. · Sprinkle your salad with seeds or nuts instead of cheese. · Cook with olive or canola oil instead of butter or oils that are high in saturated fat. · Switch from 2% milk or whole milk to 1% or fat-free milk. · Dip raw vegetables in a vinaigrette dressing or hummus instead of dips made from mayonnaise or sour cream.  · Have a piece of fruit for dessert instead of a piece of cake. Try baked apples, or have some dried fruit.   Tips for eating out  · Try broiled, grilled, baked, or poached fish instead of having it fried or breaded. · Ask your  to have your meals prepared with olive oil instead of butter. · Order dishes made with marinara sauce or sauces made from olive oil. Avoid sauces made from cream or mayonnaise. · Choose whole-grain breads, whole wheat pasta and pizza crust, brown rice, beans, and lentils. · Cut back on butter or margarine on bread. Instead, you can dip your bread in a small amount of olive oil. · Ask for a side salad or grilled vegetables instead of french fries or chips. Where can you learn more? Go to http://demarcoCaseRevmax.info/. Enter [phone] in the search box to learn more about \"Learning About the Mediterranean Diet. \"  Current as of: December 29, 2016  Content Version: 11.3  © 1278-4165 1-800-DOCTORS. Care instructions adapted under license by [employer] (which disclaims liability or warranty for this information). If you have questions about a medical condition or this instruction, always ask your healthcare professional. [place] [address] any warranty or liability for your use of this information. Learning About Low-Fat Eating  What is low-fat eating? Most food has some fat in it. Your body needs some fat to be healthy. But some kinds of fats are healthier than others. In a low-fat eating plan, you try to choose healthier fats and eat fewer unhealthy fats. Healthy fats include olive and canola oil. Try to avoid eating too much saturated fat (such as in cheese and meats) and trans fat (a type of fat found in many packaged snack foods and other baked goods). You do not need to cut all fat from your diet. But you can make healthier choices about the types and amount of fat you eat. Even though it is a good idea to choose healthier fats, it is still important to be careful of how much fat you eat, because all fats are high in calories.   What are the different types of fats?  Unhealthy fats  · Saturated fat. Saturated fats are mostly in animal foods, such as meat and dairy foods. Tropical oils, such as coconut oil, palm oil, and cocoa butter, are also saturated fats. · Trans fat. Trans fats include shortening, partially hydrogenated vegetable oils, and hydrogenated vegetable oils. Trans fats are made when a liquid fat is turned into a solid fat (for example, when corn oil is made into stick margarine). They are in many processed foods, such as cookies, crackers, and snack foods. Healthy fats  · Monounsaturated fat. Monounsaturated fats are liquid at room temperature but get solid when refrigerated. Eating foods that are high in this fat may help lower your \"bad\" (LDL) cholesterol, keep your \"good\" (HDL) cholesterol level up, and lower your chances of getting heart disease. This fat is found in canola oil, olive oil, peanut oil, olives, avocados, nuts, and nut butters. · Polyunsaturated fat. Polyunsaturated fats are liquid at room temperature. They are in safflower, sunflower, and corn oils. They are also the main fat in seafood. Omega-3 fatty acids are types of polyunsaturated fat. Omega-3 fatty acids may lower your chances of getting heart disease. Fatty fish such as salmon and mackerel contain these healthy fatty acids. So do ground flaxseeds and flaxseed oil, soybeans, walnuts, and seeds. Why cut down on unhealthy fats? Eating foods that contain saturated fats can raise the LDL (\"bad\") cholesterol in your blood. Having a high level of LDL cholesterol increases your chance of hardening of the arteries (atherosclerosis), which can lead to heart disease, heart attack, and stroke. Trans fat raises the level of \"bad\" LDL cholesterol in your blood and may lower the \"good\" HDL cholesterol in your blood. Trans fat can raise your risk of heart disease, heart attack, and stroke. In general:  · No more than 10% of your daily calories should come from saturated fat.  This is about 20 grams in a 2,000-calorie diet. · No more than 10% of your daily calories should come from polyunsaturated fat. This is about 20 grams in a 2,000-calorie diet. · Monounsaturated fats can be up to 15% of your daily calories. This is about 25 to 30 grams in a 2,000-calorie diet. If you're not sure how much fat you should be eating or how many calories you need each day to stay at a healthy weight, talk to a registered dietitian. He or she can help you create a plan that's right for you. What can you do to cut down on fat? Foods like cheese, butter, sausage, and desserts can have a lot of unhealthy fats. Try these tips for healthier meals at home and when you eat out. At home  · Fill up on fruits, vegetables, and whole grains. · Think of meat as a side dish instead of as the main part of your meal.  · When you do eat meat, make it extra-lean ground beef (97% lean), ground turkey breast (without skin added), meats with fat trimmed off before cooking, or skinless chicken. · Try main dishes that use whole wheat pasta, brown rice, dried beans, or vegetables. · Use cooking methods that use little or no fat, such as broiling, steaming, or grilling. Use cooking spray instead of oil. If you use oil, use a monounsaturated oil, such as canola or olive oil. · Read food labels on canned, bottled, or packaged foods. Choose those with little saturated fat and no trans fat. When eating out at a restaurant  · Order foods that are broiled or poached instead of fried or breaded. · Cut back on the amount of butter or margarine that you use on bread. Use small amounts of olive oil instead. · Order sauces, gravies, and salad dressings on the side, and use only a little. · When you order pasta, choose tomato sauce instead of cream sauce. · Ask for salsa with your baked potato instead of sour cream, butter, cheese, or mayer. Where can you learn more? Go to http://demarco-max.info/.   Enter X556 in the search box to learn more about \"Learning About Low-Fat Eating. \"  Current as of: July 26, 2016  Content Version: 11.3  © 0693-9536 [employer]. Care instructions adapted under license by "[employer]" (which disclaims liability or warranty for this information). If you have questions about a medical condition or this instruction, always ask your healthcare professional. [place] [address] any warranty or liability for your use of this information.

## 2017-10-18 NOTE — LETTER
NOTIFICATION RETURN TO WORK  
 
10/18/2017 4:52 PM 
 
Ms. Andrew Olivera 
[address], [place]. \[facility]"" [address] [id number] To Whom It May Concern: 
 
Cristian Bustamante is currently under the care of Mgao Jarrett. She is suffering from anxiety and depression that is exacerbated by her current employment. Changing employment has been recommended as part of the solution to this long term, chronic condition. If there are questions or concerns please have the patient contact our office.  
 
 
 
Sincerely, 
 
 
Jose Luis Rodriguez NP

## 2017-10-18 NOTE — MR AVS SNAPSHOT
Visit Information Date & Time Provider Department Dept. Phone Encounter #  
 10/18/2017  4:00 PM Champ Orellana, Adrian [address] Internal Medicine [phone] [id number] Follow-up Instructions Return in about 6 weeks (around 11/29/2017) for Hypercholesterolemia and fasting labs. Upcoming Health Maintenance Date Due INFLUENZA AGE 9 TO ADULT 8/1/2017 PAP AKA CERVICAL CYTOLOGY 2/22/2019 DTaP/Tdap/Td series (2 - Td) 2/22/2026 Allergies as of 10/18/2017  Review Complete On: 10/18/2017 By: Champ Orellana, NP Severity Noted Reaction Type Reactions Latex  02/03/2016    Rash  
 Sulfa (Sulfonamide Antibiotics) High 02/03/2016    Anaphylaxis Adhesive Tape-silicones  97/73/6478    Rash Morphine  02/03/2016    Rash Neosporin [Benzalkonium Chloride]  08/18/2016    Rash Current Immunizations  Reviewed on 2/23/2016 Name Date Influenza Vaccine (Quad) PF 2/3/2016 Pneumococcal Polysaccharide (PPSV-23) 9/1/2017 Tdap 2/22/2016 Not reviewed this visit You Were Diagnosed With   
  
 Codes Comments Anxiety    -  Primary ICD-10-CM: F41.9 ICD-9-CM: 300.00 Acute right-sided low back pain without sciatica     ICD-10-CM: M54.5 ICD-9-CM: 724.2 Hyperlipidemia, unspecified hyperlipidemia type     ICD-10-CM: E78.5 ICD-9-CM: 272.4 SVT (supraventricular tachycardia) (HCC)     ICD-10-CM: I47.1 ICD-9-CM: 427.89 Vitals BP Pulse Temp Resp Height(growth percentile) Weight(growth percentile) 128/82 (BP 1 Location: Left arm, BP Patient Position: Sitting) 71 97.8 °F (36.6 °C) (Oral) 18 5' 9\" (1.753 m) 184 lb 14.4 oz (83.9 kg) LMP SpO2 BMI OB Status Smoking Status 03/18/2016 98% 27.3 kg/m2 Hysterectomy Former Smoker Vitals History BMI and BSA Data Body Mass Index Body Surface Area  
 27.3 kg/m 2 2.02 m 2 Preferred Pharmacy Pharmacy Name Phone [facility]/PHARMACY #[id number] AT [address] [place] [address] [phone] Your Updated Medication List  
  
   
This list is accurate as of: 10/18/17  4:52 PM.  Always use your most recent med list.  
  
  
  
  
 aspirin delayed-release 81 mg tablet Take  by mouth daily. cholecalciferol 1,000 unit tablet Commonly known as:  VITAMIN D3 Take 1 Tab by mouth daily. diclofenac 1 % Gel Commonly known as:  VOLTAREN Apply 4 g to affected area four (4) times daily. LORazepam 1 mg tablet Commonly known as:  ATIVAN Take 1 Tab by mouth two (2) times a day. Max Daily Amount: 2 mg. Prn anxiety * sertraline 50 mg tablet Commonly known as:  ZOLOFT Take 1 Tab by mouth daily. * sertraline 100 mg tablet Commonly known as:  ZOLOFT Take 1 Tab by mouth daily. ZYRTEC PO Take  by mouth as needed. * Notice: This list has 2 medication(s) that are the same as other medications prescribed for you. Read the directions carefully, and ask your doctor or other care provider to review them with you. Prescriptions Sent to Pharmacy Refills  
 sertraline (ZOLOFT) 50 mg tablet 5 Sig: Take 1 Tab by mouth daily. Class: Normal  
 Pharmacy: [facility]/pharmacy #[id number] AT [address] [place] [address] Ph #: [phone] Route: Oral  
 sertraline (ZOLOFT) 100 mg tablet 5 Sig: Take 1 Tab by mouth daily. Class: Normal  
 Pharmacy: [facility]/pharmacy #[id number] AT [address] [place] [address] Ph #: [phone] Route: Oral  
 diclofenac (VOLTAREN) 1 % gel 2 Sig: Apply 4 g to affected area four (4) times daily. Class: Normal  
 Pharmacy: [facility]/pharmacy #[id number] AT [address] [place] [address] Ph #: [phone] Route: Topical  
  
We Performed the Following REFERRAL TO PSYCHIATRY [REF91 Custom] Follow-up Instructions Return in about 6 weeks (around 11/29/2017) for Hypercholesterolemia and fasting labs. To-Do List   
 12/19/2017 8:30 AM  
  Appointment with [address] [facility] 1 at [facility] ([phone]) Shower or bathe using soap and water. Do not use deodorant, powder, perfumes, or lotion the day of your exam.  If your prior mammograms were not performed at [place] [address] please bring films with you or forward prior images 2 days before your procedure. If patient is not a callback diagnostic, the patient must have an order/script from the physician for the diagnostic. Please bring it on the day of the mammogram or have it faxed to the department. [address] [place] [address]  [phone] [address] [place] [address] [phone] [facility]  [phone] [phone] [facility] [phone] [facility] [phone] [facility] [phone]  
  
 12/19/2017 9:00 AM  
  Appointment with [address] [facility] 1 at [facility] ([phone]) Shower or bathe using soap and water. Do not use deodorant, powder, perfumes, or lotion. If your prior films were not performed at a local [facility] facility please bring or forward prior images 2 days before procedure. Referral Information Referral ID Referred By Referred To  
  
 [id number] SKIFF, Charlyn Prom, NP   
   [address] [facility] [address] [facility], [address] Phone: [phone] Fax: [phone] Visits Status Start Date End Date 1 New Request 10/18/17 10/18/18 If your referral has a status of pending review or denied, additional information will be sent to support the outcome of this decision. Patient Instructions Starting a Weight Loss Plan: Care Instructions Your Care Instructions If you are thinking about losing weight, it can be hard to know where to start.  Your doctor can help you set up a weight loss plan that best meets your needs. You may want to take a class on nutrition or exercise, or join a weight loss support group. If you have questions about how to make changes to your eating or exercise habits, ask your doctor about seeing a registered dietitian or an exercise specialist. 
It can be a big challenge to lose weight. But you do not have to make huge changes at once. Make small changes, and stick with them. When those changes become habit, add a few more changes. If you do not think you are ready to make changes right now, try to pick a date in the future. Make an appointment to see your doctor to discuss whether the time is right for you to start a plan. Follow-up care is a key part of your treatment and safety. Be sure to make and go to all appointments, and call your doctor if you are having problems. Its also a good idea to know your test results and keep a list of the medicines you take. How can you care for yourself at home? · Set realistic goals. Many people expect to lose much more weight than is likely. A weight loss of 5% to 10% of your body weight may be enough to improve your health. · Get family and friends involved to provide support. Talk to them about why you are trying to lose weight, and ask them to help. They can help by participating in exercise and having meals with you, even if they may be eating something different. · Find what works best for you. If you do not have time or do not like to cook, a program that offers meal replacement bars or shakes may be better for you. Or if you like to prepare meals, finding a plan that includes daily menus and recipes may be best. 
· Ask your doctor about other health professionals who can help you achieve your weight loss goals. ¨ A dietitian can help you make healthy changes in your diet.  
¨ An exercise specialist or  can help you develop a safe and effective exercise program. 
 ¨ A counselor or psychiatrist can help you cope with issues such as depression, anxiety, or family problems that can make it hard to focus on weight loss. · Consider joining a support group for people who are trying to lose weight. Your doctor can suggest groups in your area. Where can you learn more? Go to http://demarco-max.info/. Enter K614 in the search box to learn more about \"Starting a Weight Loss Plan: Care Instructions. \" Current as of: October 13, 2016 Content Version: 11.3 © 6539-0750 TonZof. Care instructions adapted under license by [employer] (which disclaims liability or warranty for this information). If you have questions about a medical condition or this instruction, always ask your healthcare professional. [address] any warranty or liability for your use of this information. Learning About the [address] Diet What is the Mediterranean diet? The Mediterranean diet is a style of eating rather than a diet plan. It features foods eaten in [place], [place], [place] and [place], and other countries along the [facility]. It emphasizes eating foods like fish, fruits, vegetables, beans, high-fiber breads and whole grains, nuts, and olive oil. This style of eating includes limited red meat, cheese, and sweets. Why choose the Mediterranean diet? A Mediterranean-style diet may improve heart health. It contains more fat than other heart-healthy diets. But the fats are mainly from nuts, unsaturated oils (such as fish oils and olive oil), and certain nut or seed oils (such as canola, soybean, or flaxseed oil). These fats may help protect the heart and blood vessels. How can you get started on the Mediterranean diet? Here are some things you can do to switch to a more Mediterranean way of eating. What to eat · Eat a variety of fruits and vegetables each day, such as grapes, blueberries, tomatoes, broccoli, peppers, figs, olives, spinach, eggplant, beans, lentils, and chickpeas. · Eat a variety of whole-grain foods each day, such as oats, brown rice, and whole wheat bread, pasta, and couscous. · Eat fish at least 2 times a week. Try tuna, salmon, mackerel, lake trout, herring, or sardines. · Eat moderate amounts of low-fat dairy products, such as milk, cheese, or yogurt. · Eat moderate amounts of poultry and eggs. · Choose healthy (unsaturated) fats, such as nuts, olive oil, and certain nut or seed oils like canola, soybean, and flaxseed. · Limit unhealthy (saturated) fats, such as butter, palm oil, and coconut oil. And limit fats found in animal products, such as meat and dairy products made with whole milk. Try to eat red meat only a few times a month in very small amounts. · Limit sweets and desserts to only a few times a week. This includes sugar-sweetened drinks like soda. The Mediterranean diet may also include red wine with your meal1 glass each day for women and up to 2 glasses a day for men. Tips for eating at home · Use herbs, spices, garlic, lemon zest, and citrus juice instead of salt to add flavor to foods. · Add avocado slices to your sandwich instead of mayer. · Have fish for lunch or dinner instead of red meat. Brush the fish with olive oil, and broil or grill it. · Sprinkle your salad with seeds or nuts instead of cheese. · Cook with olive or canola oil instead of butter or oils that are high in saturated fat. · Switch from 2% milk or whole milk to 1% or fat-free milk. · Dip raw vegetables in a vinaigrette dressing or hummus instead of dips made from mayonnaise or sour cream. 
· Have a piece of fruit for dessert instead of a piece of cake. Try baked apples, or have some dried fruit. Tips for eating out · Try broiled, grilled, baked, or poached fish instead of having it fried or breaded. · Ask your  to have your meals prepared with olive oil instead of butter. · Order dishes made with marinara sauce or sauces made from olive oil. Avoid sauces made from cream or mayonnaise. · Choose whole-grain breads, whole wheat pasta and pizza crust, brown rice, beans, and lentils. · Cut back on butter or margarine on bread. Instead, you can dip your bread in a small amount of olive oil. · Ask for a side salad or grilled vegetables instead of french fries or chips. Where can you learn more? Go to http://demarcoXi'an 029ZP.commax.info/. Enter 212-162-6667 in the search box to learn more about \"Learning About the Mediterranean Diet. \" Current as of: December 29, 2016 Content Version: 11.3 © 1269-1585 [email or web address]. Care instructions adapted under license by [employer]bound (which disclaims liability or warranty for this information). If you have questions about a medical condition or this instruction, always ask your healthcare professional. [place] [address] any warranty or liability for your use of this information. Learning About Low-Fat Eating What is low-fat eating? Most food has some fat in it. Your body needs some fat to be healthy. But some kinds of fats are healthier than others. In a low-fat eating plan, you try to choose healthier fats and eat fewer unhealthy fats. Healthy fats include olive and canola oil. Try to avoid eating too much saturated fat (such as in cheese and meats) and trans fat (a type of fat found in many packaged snack foods and other baked goods). You do not need to cut all fat from your diet. But you can make healthier choices about the types and amount of fat you eat. Even though it is a good idea to choose healthier fats, it is still important to be careful of how much fat you eat, because all fats are high in calories. What are the different types of fats? Unhealthy fats · Saturated fat. Saturated fats are mostly in animal foods, such as meat and dairy foods. Tropical oils, such as coconut oil, palm oil, and cocoa butter, are also saturated fats. · Trans fat. Trans fats include shortening, partially hydrogenated vegetable oils, and hydrogenated vegetable oils. Trans fats are made when a liquid fat is turned into a solid fat (for example, when corn oil is made into stick margarine). They are in many processed foods, such as cookies, crackers, and snack foods. Healthy fats · Monounsaturated fat. Monounsaturated fats are liquid at room temperature but get solid when refrigerated. Eating foods that are high in this fat may help lower your \"bad\" (LDL) cholesterol, keep your \"good\" (HDL) cholesterol level up, and lower your chances of getting heart disease. This fat is found in canola oil, olive oil, peanut oil, olives, avocados, nuts, and nut butters. · Polyunsaturated fat. Polyunsaturated fats are liquid at room temperature. They are in safflower, sunflower, and corn oils. They are also the main fat in seafood. Omega-3 fatty acids are types of polyunsaturated fat. Omega-3 fatty acids may lower your chances of getting heart disease. Fatty fish such as salmon and mackerel contain these healthy fatty acids. So do ground flaxseeds and flaxseed oil, soybeans, walnuts, and seeds. Why cut down on unhealthy fats? Eating foods that contain saturated fats can raise the LDL (\"bad\") cholesterol in your blood. Having a high level of LDL cholesterol increases your chance of hardening of the arteries (atherosclerosis), which can lead to heart disease, heart attack, and stroke. Trans fat raises the level of \"bad\" LDL cholesterol in your blood and may lower the \"good\" HDL cholesterol in your blood. Trans fat can raise your risk of heart disease, heart attack, and stroke. In general: · No more than 10% of your daily calories should come from saturated fat. This is about 20 grams in a 2,000-calorie diet. · No more than 10% of your daily calories should come from polyunsaturated fat. This is about 20 grams in a 2,000-calorie diet. · Monounsaturated fats can be up to 15% of your daily calories. This is about 25 to 30 grams in a 2,000-calorie diet. If you're not sure how much fat you should be eating or how many calories you need each day to stay at a healthy weight, talk to a registered dietitian. He or she can help you create a plan that's right for you. What can you do to cut down on fat? Foods like cheese, butter, sausage, and desserts can have a lot of unhealthy fats. Try these tips for healthier meals at home and when you eat out. At home · Fill up on fruits, vegetables, and whole grains. · Think of meat as a side dish instead of as the main part of your meal. 
· When you do eat meat, make it extra-lean ground beef (97% lean), ground turkey breast (without skin added), meats with fat trimmed off before cooking, or skinless chicken. · Try main dishes that use whole wheat pasta, brown rice, dried beans, or vegetables. · Use cooking methods that use little or no fat, such as broiling, steaming, or grilling. Use cooking spray instead of oil. If you use oil, use a monounsaturated oil, such as canola or olive oil. · Read food labels on canned, bottled, or packaged foods. Choose those with little saturated fat and no trans fat. When eating out at a restaurant · Order foods that are broiled or poached instead of fried or breaded. · Cut back on the amount of butter or margarine that you use on bread. Use small amounts of olive oil instead. · Order sauces, gravies, and salad dressings on the side, and use only a little. · When you order pasta, choose tomato sauce instead of cream sauce. · Ask for salsa with your baked potato instead of sour cream, butter, cheese, or mayer. Where can you learn more? Go to http://demarco-max.info/. Enter G827 in the search box to learn more about \"Learning About Low-Fat Eating. \" Current as of: July 26, 2016 Content Version: 11.3 © 9927-9163 [employer], [employer]. Care instructions adapted under license by Tripshare (which disclaims liability or warranty for this information). If you have questions about a medical condition or this instruction, always ask your healthcare professional. [address] any warranty or liability for your use of this information. Introducing [facility]! Dear Tiffanie Jurado: 
Thank you for requesting a [employer] account. Our records indicate that you already have an active [employer] account. You can access your account anytime at [email or web address] Did you know that you can access your hospital and ER discharge instructions at any time in [employer]? You can also review all of your test results from your hospital stay or ER visit. Additional Information If you have questions, please visit the Frequently Asked Questions section of the [employer] website at [email or web address] Remember, [employer] is NOT to be used for urgent needs. For medical emergencies, dial 911. Now available from your iPhone and Android! Please provide this summary of care documentation to your next provider. Your primary care clinician is listed as Jan Tamayo. If you have any questions after today's visit, please call [phone].

## 2017-10-18 NOTE — PROGRESS NOTES
Room 5    Chief Complaint   Patient presents with    Results     Follow up from neurology and cardiology     1. Have you been to the ER, urgent care clinic since your last visit? Hospitalized since your last visit? No    2. Have you seen or consulted any other health care providers outside of the [address] [place] [address] since your last visit? Include any pap smears or colon screening.  NO

## 2017-10-20 ENCOUNTER — TELEPHONE (OUTPATIENT)
Dept: NEUROLOGY | Age: 42
End: 2017-10-20

## 2017-10-20 DIAGNOSIS — G45.9 TRANSIENT CEREBRAL ISCHEMIA, UNSPECIFIED TYPE: Primary | ICD-10-CM

## 2017-10-20 NOTE — TELEPHONE ENCOUNTER
----- Message from Anca Patel LPN sent at 18/95/5283 10:42 AM EDT -----  Regarding: FW: P2P Needed  Contact: [phone]  Reorder tests to initiate P2P again    ----- Message -----     From: Aminta Renae DO     Sent: 10/16/2017   8:21 AM       To: Memory Retort, LPN  Subject: FW: H9J Needed                                    See Pam's comment below. .. Need to contact \"coordination of care\"  ----- Message -----     From: Yasmin Teague     Sent: 10/13/2017   4:07 PM       To: Aminta Renae DO  Subject: RE: P2P Needed                                   Any appeals, peer to peers, etc., for high tech imaging orders (MRI, CT) can be communicated from nurse to Coordination of Care directly. I appreciate you informing me, Dr. Nereida Laureano, but honestly I don't need to be in the loop. If there are not giving you enough notice (which we have experienced here as well), the office should contact [facility] over at [place] to bring to her attention (which Emelyn Alvarez did for us a while back). It's still happening, but not as often it seems. I hope this helps. Rufus Salgado     ----- Message -----     From: Aminta Renae DO     Sent: 10/13/2017   3:38 PM       To: Cris Nguyễn LPN, #  Subject: RE: P2P Needed                                   I received word of denial on October 4. Apparently the case was closed on October 5 prior to allowing enough time to even appeal.  We are going to resubmit request.  Patient suffered a TIA and requires MRI brain and MRA of the head.     SOLE Lipscomb let the patient know where still working on this.  ----- Message -----     From: Anca Patel LPN     Sent: 00/9/6016   2:29 PM       To: Aminta Renae DO  Subject: FW: P2P Needed                                       ----- Message -----     From: Georgina Dodd     Sent: 10/4/2017  10:10 AM       To: Memory Retort, LPN  Subject: H1U Needed Good Morning,    This patient's tests are requiring a p2p:    Per AIM website Ania Pouch is pending for cpt [id number] & [id number], ordering  MD office needs to call [facility] @ [phone] to speak with one of their MD reviewers regarding the studies. .    Please let me know if the doctor is able to do these, otherwise the patient will be canceled/recheduled. Thank you.

## 2017-10-29 DIAGNOSIS — R60.9 PERIPHERAL EDEMA: ICD-10-CM

## 2017-10-30 RX ORDER — HYDROCHLOROTHIAZIDE 12.5 MG/1
TABLET ORAL
Refills: 0 | COMMUNITY
Start: 2017-10-02 | End: 2017-10-30 | Stop reason: SDUPTHER

## 2017-10-30 RX ORDER — HYDROCHLOROTHIAZIDE 12.5 MG/1
TABLET ORAL
Qty: 30 TAB | Refills: 0 | Status: SHIPPED | OUTPATIENT
Start: 2017-10-30 | End: 2017-10-30

## 2017-10-30 NOTE — PROGRESS NOTES
Ambulatory cardiology attending note:    Ms. Selwyn Cote is a 60-year-old female kindly referred by [facility] for cardiac assessment after an episode of syncope with a prodrome of malaise and palpitations. Detailed history is outlined by MARQUISE Chatterjee. I also participated in the interview and examination of this patient. Full discussion was then held both with and without the patient to decide a diagnostic plan of action.     Joycelyn Luong MD [facility]

## 2017-10-31 ENCOUNTER — TELEPHONE (OUTPATIENT)
Dept: NEUROLOGY | Age: 42
End: 2017-10-31

## 2017-10-31 DIAGNOSIS — K83.9 BILE DUCT ABNORMALITY: Primary | ICD-10-CM

## 2017-11-01 ENCOUNTER — OFFICE VISIT (OUTPATIENT)
Dept: SURGERY | Age: 42
End: 2017-11-01

## 2017-11-01 VITALS
DIASTOLIC BLOOD PRESSURE: 70 MMHG | SYSTOLIC BLOOD PRESSURE: 116 MMHG | BODY MASS INDEX: 26.36 KG/M2 | RESPIRATION RATE: 18 BRPM | HEIGHT: 69 IN | WEIGHT: 178 LBS | TEMPERATURE: 98.9 F | HEART RATE: 80 BPM | OXYGEN SATURATION: 98 %

## 2017-11-01 DIAGNOSIS — R10.13 EPIGASTRIC ABDOMINAL PAIN: ICD-10-CM

## 2017-11-01 DIAGNOSIS — E80.6 HYPERBILIRUBINEMIA: Primary | ICD-10-CM

## 2017-11-01 DIAGNOSIS — R19.7 DIARRHEA, UNSPECIFIED TYPE: ICD-10-CM

## 2017-11-01 NOTE — MR AVS SNAPSHOT
Visit Information Date & Time Provider Department Dept. Phone Encounter #  
 11/1/2017  1:20 PM Bria Dumont MD [address] [address] [phone] [id number] Follow-up Instructions Return in about 3 weeks (around 11/22/2017). Routing History Your Appointments 11/29/2017  9:00 AM  
PHYSICAL PRE OP with [facility] Appt Note: fu fasting labs [address] Upcoming Health Maintenance Date Due INFLUENZA AGE 9 TO ADULT 8/1/2017 PAP AKA CERVICAL CYTOLOGY 2/22/2019 DTaP/Tdap/Td series (2 - Td) 2/22/2026 Allergies as of 11/1/2017  Review Complete On: 11/1/2017 By: Bria Dumont MD  
  
 Severity Noted Reaction Type Reactions Latex  02/03/2016    Rash  
 Sulfa (Sulfonamide Antibiotics) High 02/03/2016    Anaphylaxis Adhesive Tape-silicones  05/20/2302    Rash Morphine  02/03/2016    Rash Neosporin [Benzalkonium Chloride]  08/18/2016    Rash Current Immunizations  Reviewed on 2/23/2016 Name Date Influenza Vaccine (Quad) PF 2/3/2016 Pneumococcal Polysaccharide (PPSV-23) 9/1/2017 Tdap 2/22/2016 Not reviewed this visit You Were Diagnosed With   
  
 Codes Comments Hyperbilirubinemia    -  Primary ICD-10-CM: E80.6 ICD-9-CM: 782.4 Diarrhea, unspecified type     ICD-10-CM: R19.7 ICD-9-CM: 787.91 Epigastric abdominal pain     ICD-10-CM: R10.13 ICD-9-CM: 789.06 Vitals BP Pulse Temp Resp Height(growth percentile) Weight(growth percentile) 116/70 80 98.9 °F (37.2 °C) 18 5' 9\" (1.753 m) 178 lb (80.7 kg) LMP SpO2 BMI OB Status Smoking Status 03/18/2016 98% 26.29 kg/m2 Hysterectomy Former Smoker Vitals History BMI and BSA Data Body Mass Index Body Surface Area  
 26.29 kg/m 2 1.98 m 2 Preferred Pharmacy Pharmacy Name Phone [facility][id number]- [place], [place] - [address] [facility] [phone] Your Updated Medication List  
  
   
This list is accurate as of: 11/1/17  4:34 PM.  Always use your most recent med list.  
  
  
  
  
 aspirin delayed-release 81 mg tablet Take  by mouth daily. cholecalciferol 1,000 unit tablet Commonly known as:  VITAMIN D3 Take 1 Tab by mouth daily. LORazepam 1 mg tablet Commonly known as:  ATIVAN Take 1 Tab by mouth two (2) times a day. Max Daily Amount: 2 mg. Prn anxiety * sertraline 50 mg tablet Commonly known as:  ZOLOFT Take 1 Tab by mouth daily. * sertraline 100 mg tablet Commonly known as:  ZOLOFT Take 1 Tab by mouth daily. ZYRTEC PO Take  by mouth as needed. * Notice: This list has 2 medication(s) that are the same as other medications prescribed for you. Read the directions carefully, and ask your doctor or other care provider to review them with you. We Performed the Following REFERRAL TO GASTROENTEROLOGY [UMJ73 Custom] Comments:  
 Elevated bilireubin levels, dilated common bile duct, setting up for MRCP Follow-up Instructions Return in about 3 weeks (around 11/22/2017). To-Do List   
 11/01/2017 Imaging:  MRI ABD W MRCP W WO CONT   
  
 12/19/2017 8:30 AM  
  Appointment with [facility]FAEL 1 at [facility] ([phone]) Shower or bathe using soap and water. Do not use deodorant, powder, perfumes, or lotion the day of your exam.  If your prior mammograms were not performed at [facility] 6 please bring films with you or forward prior images 2 days before your procedure. If patient is not a callback diagnostic, the patient must have an order/script from the physician for the diagnostic. Please bring it on the day of the mammogram or have it faxed to the department.   [facility]  [phone] [facility] [phone] CIL [phone] [facility] [phone] [facility] [phone] Martin Calloway [phone]  
  
 12/19/2017 9:00 AM  
  Appointment with [address] [facility] [address] at [address] [place] [address] ([phone]) Shower or bathe using soap and water. Do not use deodorant, powder, perfumes, or lotion. If your prior films were not performed at a local [facility] facility please bring or forward prior images 2 days before procedure. Referral Information Referral ID Referred By Referred To  
  
 [id number] [facility] [address] [facility] [phone] Tanner Vashti [place] [facility] Visits Status Start Date End Date 1 Open 11/1/17 11/1/18 If your referral has a status of pending review or denied, additional information will be sent to support the outcome of this decision. Patient Instructions Abdominal Pain: Care Instructions Your Care Instructions Abdominal pain has many possible causes. Some aren't serious and get better on their own in a few days. Others need more testing and treatment. If your pain continues or gets worse, you need to be rechecked and may need more tests to find out what is wrong. You may need surgery to correct the problem. Don't ignore new symptoms, such as fever, nausea and vomiting, urination problems, pain that gets worse, and dizziness. These may be signs of a more serious problem. Your doctor may have recommended a follow-up visit in the next 8 to 12 hours. If you are not getting better, you may need more tests or treatment. The doctor has checked you carefully, but problems can develop later. If you notice any problems or new symptoms, get medical treatment right away. Follow-up care is a key part of your treatment and safety. Be sure to make and go to all appointments, and call your doctor if you are having problems. It's also a good idea to know your test results and keep a list of the medicines you take. How can you care for yourself at home? · Rest until you feel better. · To prevent dehydration, drink plenty of fluids, enough so that your urine is light yellow or clear like water. Choose water and other caffeine-free clear liquids until you feel better. If you have kidney, heart, or liver disease and have to limit fluids, talk with your doctor before you increase the amount of fluids you drink. · If your stomach is upset, eat mild foods, such as rice, dry toast or crackers, bananas, and applesauce. Try eating several small meals instead of two or three large ones. · Wait until 48 hours after all symptoms have gone away before you have spicy foods, alcohol, and drinks that contain caffeine. · Do not eat foods that are high in fat. · Avoid anti-inflammatory medicines such as aspirin, ibuprofen (Advil, Motrin), and naproxen (Aleve). These can cause stomach upset. Talk to your doctor if you take daily aspirin for another health problem. When should you call for help? Call 911 anytime you think you may need emergency care. For example, call if: 
? · You passed out (lost consciousness). ? · You pass maroon or very bloody stools. ? · You vomit blood or what looks like coffee grounds. ? · You have new, severe belly pain. ?Call your doctor now or seek immediate medical care if: 
? · Your pain gets worse, especially if it becomes focused in one area of your belly. ? · You have a new or higher fever. ? · Your stools are black and look like tar, or they have streaks of blood. ? · You have unexpected vaginal bleeding. ? · You have symptoms of a urinary tract infection. These may include: 
¨ Pain when you urinate. ¨ Urinating more often than usual. 
¨ Blood in your urine. ? · You are dizzy or lightheaded, or you feel like you may faint. ? Watch closely for changes in your health, and be sure to contact your doctor if: 
? · You are not getting better after 1 day (24 hours). Where can you learn more? Go to http://demarco-max.info/. Enter X107 in the search box to learn more about \"Abdominal Pain: Care Instructions. \" Current as of: March 20, 2017 Content Version: 11.4 © 1065-5677 [employer]. Care instructions adapted under license by [employer] (which disclaims liability or warranty for this information). If you have questions about a medical condition or this instruction, always ask your healthcare professional. [address] any warranty or liability for your use of this information. Introducing [facility] & HEALTH SERVICES! Dear Milagros Young: 
Thank you for requesting a 404 Found! account. Our records indicate that you already have an active 404 Found! account. You can access your account anytime at [email or web address]. [email or web address] Did you know that you can access your hospital and ER discharge instructions at any time in 404 Found!? You can also review all of your test results from your hospital stay or ER visit. Additional Information If you have questions, please visit the Frequently Asked Questions section of the 404 Found! website at [email or web address] Remember, 404 Found! is NOT to be used for urgent needs. For medical emergencies, dial 911. Now available from your iPhone and Android! Please provide this summary of care documentation to your next provider. Your primary care clinician is listed as Cheli Hirsch. If you have any questions after today's visit, please call [phone].

## 2017-11-01 NOTE — PATIENT INSTRUCTIONS
Abdominal Pain: Care Instructions  Your Care Instructions    Abdominal pain has many possible causes. Some aren't serious and get better on their own in a few days. Others need more testing and treatment. If your pain continues or gets worse, you need to be rechecked and may need more tests to find out what is wrong. You may need surgery to correct the problem. Don't ignore new symptoms, such as fever, nausea and vomiting, urination problems, pain that gets worse, and dizziness. These may be signs of a more serious problem. Your doctor may have recommended a follow-up visit in the next 8 to 12 hours. If you are not getting better, you may need more tests or treatment. The doctor has checked you carefully, but problems can develop later. If you notice any problems or new symptoms, get medical treatment right away. Follow-up care is a key part of your treatment and safety. Be sure to make and go to all appointments, and call your doctor if you are having problems. It's also a good idea to know your test results and keep a list of the medicines you take. How can you care for yourself at home? · Rest until you feel better. · To prevent dehydration, drink plenty of fluids, enough so that your urine is light yellow or clear like water. Choose water and other caffeine-free clear liquids until you feel better. If you have kidney, heart, or liver disease and have to limit fluids, talk with your doctor before you increase the amount of fluids you drink. · If your stomach is upset, eat mild foods, such as rice, dry toast or crackers, bananas, and applesauce. Try eating several small meals instead of two or three large ones. · Wait until 48 hours after all symptoms have gone away before you have spicy foods, alcohol, and drinks that contain caffeine. · Do not eat foods that are high in fat. · Avoid anti-inflammatory medicines such as aspirin, ibuprofen (Advil, Motrin), and naproxen (Aleve).  These can cause stomach upset. Talk to your doctor if you take daily aspirin for another health problem. When should you call for help? Call 911 anytime you think you may need emergency care. For example, call if:  ? · You passed out (lost consciousness). ? · You pass maroon or very bloody stools. ? · You vomit blood or what looks like coffee grounds. ? · You have new, severe belly pain. ?Call your doctor now or seek immediate medical care if:  ? · Your pain gets worse, especially if it becomes focused in one area of your belly. ? · You have a new or higher fever. ? · Your stools are black and look like tar, or they have streaks of blood. ? · You have unexpected vaginal bleeding. ? · You have symptoms of a urinary tract infection. These may include:  ¨ Pain when you urinate. ¨ Urinating more often than usual.  ¨ Blood in your urine. ? · You are dizzy or lightheaded, or you feel like you may faint. ? Watch closely for changes in your health, and be sure to contact your doctor if:  ? · You are not getting better after 1 day (24 hours). Where can you learn more? Go to http://demarco-max.info/. Enter N478 in the search box to learn more about \"Abdominal Pain: Care Instructions. \"  Current as of: March 20, 2017  Content Version: 11.4  © 6115-6067 [employer]. Care instructions adapted under license by [employer] (which disclaims liability or warranty for this information). If you have questions about a medical condition or this instruction, always ask your healthcare professional. [place] [address] any warranty or liability for your use of this information.

## 2017-11-01 NOTE — LETTER
11/1/2017 4:34 PM 
 
Patient:  Angela Shields  
YOB: 1975 Date of Visit: 11/1/2017 Dear Lanette Ash NP 
[facility] [address] VIA In Basket 
 : Thank you for referring Ms. Angelica Yee to me for evaluation/treatment. Below are the relevant portions of my assessment and plan of care. If you have questions, please do not hesitate to call me. I look forward to following Ms. Olivera along with you. Sincerely, Huma Jacobson MD

## 2017-11-01 NOTE — PROGRESS NOTES
[facility] History and Physical    History of Present Illness:      Zari Chase is a 40 y.o. female who has been having abdominal pain, diarrhea, and elevated LFTs. She has been having these issues for the past 2 months. She has been seeing a GI at [facility] Dr Cassie Hou and had a colonoscopy which was reportedly normal.  She says her diarrhea is up to 10 times a day or more with a small amount of clear fluid. Her BM when solid appear to be light and tan color. She does have some abdominal pain epigastric. The pain does not seem to be related to eating. She says she was going to be sent for MRCP soon. [place] [facility] [place] showed no [place]stones but had a dilated common bile duct. Past Medical History:   Diagnosis Date    Adverse effect of anesthesia     hard to wake up    Anxiety     Arthritis     back    Asperger syndrome     Asthma     Hypercholesterolemia     IBD (inflammatory bowel disease)     Ill-defined condition     discomfort hips shoulders knees and feet and hands MAURICIO was positive    Irritable bowel disease 1995    Psychiatric disorder     anxiety and depression       Past Surgical History:   Procedure Laterality Date    HX HYSTERECTOMY  4/14/16    da Dotty Robotic Total Laparoscopic Hysterectomy with bilateral  salpingectomy more than 250 grams. Cystoscopy.  HX ORTHOPAEDIC      Spinal fusion, back surgery,rods and screws    HX ORTHOPAEDIC      achilles lenghtening    HX ORTHOPAEDIC      laminectomy    HX OTHER SURGICAL      tumor removed from nose as child    HX OTHER SURGICAL      dental extraction         Current Outpatient Prescriptions:     aspirin delayed-release 81 mg tablet, Take  by mouth daily. , Disp: , Rfl:     sertraline (ZOLOFT) 50 mg tablet, Take 1 Tab by mouth daily. , Disp: 30 Tab, Rfl: 5    sertraline (ZOLOFT) 100 mg tablet, Take 1 Tab by mouth daily. , Disp: 30 Tab, Rfl: 5    CETIRIZINE HCL (ZYRTEC PO), Take  by mouth as needed. , Disp: , Rfl:   cholecalciferol (VITAMIN D3) 1,000 unit tablet, Take 1 Tab by mouth daily. , Disp: 60 Tab, Rfl: 11    LORazepam (ATIVAN) 1 mg tablet, Take 1 Tab by mouth two (2) times a day. Max Daily Amount: 2 mg. Prn anxiety, Disp: 30 Tab, Rfl: 0    Allergies   Allergen Reactions    Latex Rash    Sulfa (Sulfonamide Antibiotics) Anaphylaxis    Adhesive Tape-Silicones Rash    Morphine Rash    Neosporin [Benzalkonium Chloride] Rash       Social History     Social History    Marital status:      Spouse name: N/A    Number of children: N/A    Years of education: N/A     Occupational History     9th and 12th grade      Social History Main Topics    Smoking status: Former Smoker     Packs/day: 1.00     Years: 10.00     Quit date: 4/7/2004    Smokeless tobacco: Never Used    Alcohol use No    Drug use: Yes     Special: Marijuana      Comment: 2x/week     Sexual activity: Yes     Partners: Female     Birth control/ protection: None     Other Topics Concern    Not on file     Social History Narrative    Lives with wife.  in 2013. Together since 2004.     1 cat.         Family History   Problem Relation Age of Onset    Cancer Mother     Heart Disease Father     Depression Father     Cancer Sister     Alzheimer Maternal Grandmother     Diabetes Maternal Grandfather     Heart Surgery Paternal Grandmother     Heart Surgery Paternal Grandfather        ROS   Constitutional: negative  Ears, Nose, Mouth, Throat, and Face: negative  Respiratory: negative  Cardiovascular: negative  Gastrointestinal: positive for nausea, diarrhea and abdominal pain  Genitourinary:negative  Integument/Breast: negative  Hematologic/Lymphatic: negative  Behavioral/Psychiatric: negative  Allergic/Immunologic: negative      Physical Exam:     Visit Vitals    /70    Pulse 80    Temp 98.9 °F (37.2 °C)    Resp 18    Ht 5' 9\" (1.753 m)    Wt 178 lb (80.7 kg)    SpO2 98%    BMI 26.29 kg/m2 General - alert and oriented, no apparent distress  HEENT - no jaundice, no hearing imparement  Pulm - CTAB, no C/W/R  CV - RRR, no M/R/G  Abd - soft, ND, BS present, NTTP, no hernia  Ext - pulses intact in UE and LE bilaterally, no edema  Skin - supple, no rashes  Psychiatric - normal affect, good mood    Labs  Lab Results   Component Value Date/Time    Sodium 140 09/01/2017 04:18 PM    Potassium 4.3 09/01/2017 04:18 PM    Chloride 103 09/01/2017 04:18 PM    CO2 22 09/01/2017 04:18 PM    Anion gap 10 04/15/2016 04:48 AM    Glucose 90 09/01/2017 04:18 PM    BUN 10 09/01/2017 04:18 PM    Creatinine 0.73 09/01/2017 04:18 PM    BUN/Creatinine ratio 14 09/01/2017 04:18 PM    GFR est  09/01/2017 04:18 PM    GFR est non- 09/01/2017 04:18 PM    Calcium 9.7 09/01/2017 04:18 PM    Bilirubin, total 1.4 09/01/2017 04:18 PM    AST (SGOT) 39 09/01/2017 04:18 PM    Alk. phosphatase 70 09/01/2017 04:18 PM    Protein, total 7.1 09/01/2017 04:18 PM    Albumin 4.8 09/01/2017 04:18 PM    A-G Ratio 2.1 09/01/2017 04:18 PM    ALT (SGPT) 79 09/01/2017 04:18 PM     Lab Results   Component Value Date/Time    WBC 8.2 09/01/2017 04:18 PM    HGB 14.6 09/01/2017 04:18 PM    HCT 43.6 09/01/2017 04:18 PM    PLATELET 563 74/98/3335 04:18 PM    MCV 89 09/01/2017 04:18 PM         Imaging  none  I have reviewed and agree with all of the pertinent images    Assessment:     Mela Guthrie is a 40 y.o. female with diarrhea, elevated bilirubin, dilated common bile duct and epigastric pain    Recommendations:     1. The cause of these issues is not currently clear with her work up so far. Her US shows dilated common bile duct and may have some level of obstruction there. She apparently does not have any stones on the US in the GB. She will need MRCP to see if there is an obstruction of the CBD. She will also be referred to Dr Nisha Rodriguez of GI for evaluation. She may need ERCP possibly.   I will call her with the results of the MRCP and make further plans then. Hardy Turk MD    Ms. Yanni Hernández has a reminder for a \"due or due soon\" health maintenance. I have asked that she contact her primary care provider for follow-up on this health maintenance.

## 2017-11-01 NOTE — PROGRESS NOTES
1. Have you been to the ER, urgent care clinic since your last visit? Hospitalized since your last visit?  no    2. Have you seen or consulted any other health care providers outside of the [address] [place] [address] since your last visit? Include any pap smears or colon screening.    no

## 2017-11-02 ENCOUNTER — TELEPHONE (OUTPATIENT)
Dept: NEUROLOGY | Age: 42
End: 2017-11-02

## 2017-11-02 NOTE — TELEPHONE ENCOUNTER
Peer to peer completed this morning with Dr. Tigist Mora by phone. She approved the MRI brain. She told me she is going to send the authorization this afternoon to the office. We will start with the MRI brain noncontrast first.  If there is any abnormality will proceed with vascular imaging. Insurance company will not cover the MRA at this time.

## 2017-11-06 ENCOUNTER — HOSPITAL ENCOUNTER (OUTPATIENT)
Dept: MRI IMAGING | Age: 42
Discharge: HOME | End: 2017-11-06
Attending: PSYCHIATRY & NEUROLOGY
Payer: COMMERCIAL

## 2017-11-06 DIAGNOSIS — G45.9 TRANSIENT CEREBRAL ISCHEMIA, UNSPECIFIED TYPE: ICD-10-CM

## 2017-11-06 PROCEDURE — 70551 MRI BRAIN STEM W/O DYE: CPT

## 2017-11-07 ENCOUNTER — TELEPHONE (OUTPATIENT)
Dept: NEUROLOGY | Age: 42
End: 2017-11-07

## 2017-11-07 NOTE — PROGRESS NOTES
Very normal-appearing and reassuring. The event she had was likely a TIA which does not leave any abnormalities on imaging. She will need to continue aspirin from here on out. I will have my office contact her with these results.

## 2017-11-07 NOTE — TELEPHONE ENCOUNTER
I reviewed the MRI brain and it is completely normal.  Continue the aspirin from here on out. We now have a very good baseline if in the event we ever have to repeat imaging. Continue follow-up with her primary care. Any questions, let me know.

## 2017-11-11 ENCOUNTER — HOSPITAL ENCOUNTER (OUTPATIENT)
Dept: MRI IMAGING | Age: 42
Discharge: HOME | End: 2017-11-11
Attending: SURGERY
Payer: COMMERCIAL

## 2017-11-11 VITALS — WEIGHT: 185 LBS | BODY MASS INDEX: 27.32 KG/M2

## 2017-11-11 DIAGNOSIS — E80.6 HYPERBILIRUBINEMIA: ICD-10-CM

## 2017-11-11 PROCEDURE — A9577 INJ MULTIHANCE: HCPCS | Performed by: SURGERY

## 2017-11-11 PROCEDURE — 74011000258 HC RX REV CODE- 258: Performed by: SURGERY

## 2017-11-11 PROCEDURE — 74011250636 HC RX REV CODE- 250/636: Performed by: SURGERY

## 2017-11-11 PROCEDURE — 74183 MRI ABD W/O CNTR FLWD CNTR: CPT

## 2017-11-11 RX ADMIN — GADOBENATE DIMEGLUMINE 17 ML: 529 INJECTION, SOLUTION INTRAVENOUS at 15:00

## 2017-11-11 RX ADMIN — SODIUM CHLORIDE 40 ML: 900 INJECTION, SOLUTION INTRAVENOUS at 15:00

## 2017-11-15 ENCOUNTER — TELEPHONE (OUTPATIENT)
Dept: SURGERY | Age: 42
End: 2017-11-15

## 2017-11-15 NOTE — TELEPHONE ENCOUNTER
I called the patient to tell her the MRI did not show any obstruction or issues with the bile ducts or gallbladder. Some fatty deposits in the liver were the only thing noted. No need for surgery at this time. I did refer to Dr Everett Mckeon office but she declined at this time.       Carrol Ybarra

## 2017-11-29 ENCOUNTER — OFFICE VISIT (OUTPATIENT)
Dept: INTERNAL MEDICINE CLINIC | Facility: CLINIC | Age: 42
End: 2017-11-29

## 2017-11-29 VITALS
HEIGHT: 69 IN | BODY MASS INDEX: 27.19 KG/M2 | HEART RATE: 75 BPM | SYSTOLIC BLOOD PRESSURE: 113 MMHG | OXYGEN SATURATION: 98 % | TEMPERATURE: 97.4 F | RESPIRATION RATE: 18 BRPM | WEIGHT: 183.6 LBS | DIASTOLIC BLOOD PRESSURE: 70 MMHG

## 2017-11-29 DIAGNOSIS — Z23 ENCOUNTER FOR IMMUNIZATION: ICD-10-CM

## 2017-11-29 DIAGNOSIS — H02.66 XANTHELASMA OF EYELID, BILATERAL: ICD-10-CM

## 2017-11-29 DIAGNOSIS — R74.01 ELEVATED ALT MEASUREMENT: Primary | ICD-10-CM

## 2017-11-29 DIAGNOSIS — E80.6 HYPERBILIRUBINEMIA: ICD-10-CM

## 2017-11-29 DIAGNOSIS — H02.63 XANTHELASMA OF EYELID, BILATERAL: ICD-10-CM

## 2017-11-29 DIAGNOSIS — E78.5 HYPERLIPIDEMIA, UNSPECIFIED HYPERLIPIDEMIA TYPE: ICD-10-CM

## 2017-11-29 DIAGNOSIS — F41.9 ANXIETY: ICD-10-CM

## 2017-11-29 DIAGNOSIS — F43.10 PTSD (POST-TRAUMATIC STRESS DISORDER): ICD-10-CM

## 2017-11-29 DIAGNOSIS — K58.0 IRRITABLE BOWEL SYNDROME WITH DIARRHEA: ICD-10-CM

## 2017-11-29 RX ORDER — SERTRALINE HYDROCHLORIDE 100 MG/1
100 TABLET, FILM COATED ORAL DAILY
Qty: 90 TAB | Refills: 3 | Status: SHIPPED | OUTPATIENT
Start: 2017-11-29 | End: 2017-12-06 | Stop reason: DRUGHIGH

## 2017-11-29 RX ORDER — LORAZEPAM 1 MG/1
1 TABLET ORAL 2 TIMES DAILY
Qty: 30 TAB | Refills: 0 | Status: SHIPPED | OUTPATIENT
Start: 2017-11-29 | End: 2018-02-02 | Stop reason: SDUPTHER

## 2017-11-29 NOTE — PROGRESS NOTES
Subjective:      Tomas Villasenor is a 40 y.o. female who presents today for fasting labs. She also notes eye concern. Eye complaint: she has a bump on her lower eye lid. She first noted deposits under her eyelids years ago. Mental Health Review  Patient is seen for anxiety disorder. Since last visit: she states she has been doing extremely well. She is still having a lot of anxiety related to her job and is actively looking for a new position. She states the zoloft has helped significantly but she still has trouble sleeping at nights. Discussed trying an increase in her dose to 100mg. She also needs a refill of ativan to help her sleep. She uses this a few days in a row monthly, last refill was in 8/30/217. Ongoing symptoms include: insomnia, uncontrolled IBS. She denies: none, SI/SA. Reported side effects from the treatment: none. BMI:   Wt Readings from Last 3 Encounters:   11/29/17 183 lb 9.6 oz (83.3 kg)   11/11/17 185 lb (83.9 kg)   11/01/17 178 lb (80.7 kg)   Weight loss goals:  1. Meal and exercise plan- she is doing! 2. Going to the gym 3 times weekly for an hour- doing! 3. 5,000 steps daily- she is doing! 4. Identifying restaurants where she can get healthy options- she is doing this! [employer] at [employer]. 5. Get a fit bit- she completed! Will repeat CMP and lipids today for hyperlipidemia, elevated ALT, and hyperbilirubinemia. IBS: she saw general surgery who did a MRI of abdomen, see below. She was told she does not need surgery at this time. She states she was having liquid stools 6-7 times throughout the day/night. She it taking an imodium once weekly and she states this is helping. She has seen multiple GI specialists for these symptoms, she has been tested celiac and c.diff, she has had normal endoscopy and colonoscopys. She states she has been told it is IBS and she needs to treat it by decreasing stress.      MRI Results (most recent):    Results from [facility] Encounter encounter on 11/11/17   MRI ABD W MRCP W WO CONT   Narrative EXAM:  MRI ABD W MRCP W WO CONT    INDICATION:   Hyperbilirubinemia. Abnormal liver function tests; dilated common  bile duct on RUQ US, elevated bili and LFTs, evaluate for obstruction of CBD. COMPARISON: None. CONTRAST:  17 mL of MultiHance. TECHNIQUE: MRI of the abdomen was performed with coronal T2, axial in and  opposed phase bh T1, axial T2 trufi tra bh, axial T2, axial T2 fat sat bh, axial  pre-and multiphase postcontrast T1 fat-sat, and postcontrast coronal T1 fat-sat. Additionally, thin section coronal T2 haste MRCP and 3-D free breathing MRCP  imaging through the biliary and pancreatic ducts are performed. FINDINGS:  The liver shows diffuse loss of signal on opposed phase relative to in phase  imaging with sparing about the gallbladder fossa. There are numerous 4-5 mm foci  of within the liver on arterial phase which visualizes to adjacent liver on  portal venous and more delayed phases with slight decrease in fatty deposition  relative to adjacent liver with no other T1 or T2 signal correlate. The liver is  otherwise unremarkable. The gallbladder is normal with no filling defects. The  spleen is normal.  The pancreas is normal.  The adrenal glands are normal.  The  kidneys are normal.    There is there is no intra or extrahepatic biliary ductal dilation with  incidental note of anatomic variant of low insertion of the right anterior  hepatic duct branch onto the common bile duct. No biliary strictures or filling  defects are seen. There is no pancreas divisum. There is no lymphadenopathy or ascites. The visualized lower chest is  unremarkable. The surrounding musculoskeletal and soft tissues are unremarkable  apart from susceptibility artifact related to lumbar spinal fixation hardware.          Impression IMPRESSION:  Hepatic steatosis with numerous foci of enhancement  with  associated focal fatty sparing most likely related to transient hepatic  enhancement differences. No biliary obstructive lesion or other significant  biliary or hepatic abnormality. Patient Active Problem List    Diagnosis Date Noted    PTSD (post-traumatic stress disorder) 11/29/2017    Adult BMI 27.0-27.9 kg/sq m 10/18/2017    Elevated BP without diagnosis of hypertension 09/18/2017    Hyperbilirubinemia 09/05/2017    Elevated ALT measurement 09/05/2017    Family history of exposure to Agent Orange 12/21/2016    Cellulitis of left upper extremity 08/19/2016    Arthralgia 08/19/2016    Positive MAURICIO (antinuclear antibody) 08/19/2016    Hyperlipidemia 08/19/2016    Vitamin D deficiency 08/19/2016    Anxiety 08/19/2016    DDD (degenerative disc disease), lumbar 08/19/2016    Colitis 08/19/2016    Mild intermittent asthma without complication 36/12/2688    Fibroids 03/23/2016     Current Outpatient Prescriptions   Medication Sig Dispense Refill    sertraline (ZOLOFT) 100 mg tablet Take 1 Tab by mouth daily. 90 Tab 3    LORazepam (ATIVAN) 1 mg tablet Take 1 Tab by mouth two (2) times a day. Max Daily Amount: 2 mg. Prn anxiety 30 Tab 0    aspirin delayed-release 81 mg tablet Take  by mouth daily.  CETIRIZINE HCL (ZYRTEC PO) Take  by mouth as needed.  cholecalciferol (VITAMIN D3) 1,000 unit tablet Take 1 Tab by mouth daily.  60 Tab 11     Allergies   Allergen Reactions    Latex Rash    Sulfa (Sulfonamide Antibiotics) Anaphylaxis    Adhesive Tape-Silicones Rash    Morphine Rash    Neosporin [Benzalkonium Chloride] Rash     Past Medical History:   Diagnosis Date    Adverse effect of anesthesia     hard to wake up    Anxiety     Arthritis     back    Asperger syndrome     Asthma     Hypercholesterolemia     IBD (inflammatory bowel disease)     Ill-defined condition     discomfort hips shoulders knees and feet and hands MAURICIO was positive    Irritable bowel disease 1995    Psychiatric disorder     anxiety and depression     Family History   Problem Relation Age of Onset    Cancer Mother     Heart Disease Father     Depression Father     Cancer Sister     Alzheimer Maternal Grandmother     Diabetes Maternal Grandfather     Heart Surgery Paternal Grandmother     Heart Surgery Paternal Grandfather      Social History   Substance Use Topics    Smoking status: Former Smoker     Packs/day: 1.00     Years: 10.00     Quit date: 4/7/2004    Smokeless tobacco: Never Used    Alcohol use No        Review of Systems    A comprehensive review of systems was negative except for that written in the HPI. Objective:     Visit Vitals    /70 (BP 1 Location: Left arm, BP Patient Position: Sitting)    Pulse 75    Temp 97.4 °F (36.3 °C) (Oral)    Resp 18    Ht 5' 9\" (1.753 m)    Wt 183 lb 9.6 oz (83.3 kg)    LMP 03/18/2016    SpO2 98%    BMI 27.11 kg/m2     General appearance: alert, cooperative, no distress, appears stated age  Head: Normocephalic, without obvious abnormality, atraumatic  Eyes: negative  Back: symmetric, no curvature. ROM normal. No CVA tenderness. Lungs: clear to auscultation bilaterally  Heart: regular rate and rhythm, S1, S2 normal, no murmur, click, rub or gallop  Extremities: extremities normal, atraumatic, no cyanosis or edema  Pulses: 2+ and symmetric  Skin: Skin color, texture, turgor normal. Small fatty deposits noted under bilateral eyes, right lower lid with very small skin tag  Neurologic: Grossly normal  Psych: appropriate mood, speech, affect  Nursing note and vitals reviewed  Assessment/Plan:       ICD-10-CM ICD-9-CM    1. Elevated ALT measurement J58.1 989.2 METABOLIC PANEL, COMPREHENSIVE   2. Hyperbilirubinemia R67.5 312.6 METABOLIC PANEL, COMPREHENSIVE   3. Hyperlipidemia, unspecified hyperlipidemia type E78.5 272.4 LIPID PANEL   4. PTSD (post-traumatic stress disorder) F43.10 309.81    5. Anxiety F41.9 300.00 sertraline (ZOLOFT) 100 mg tablet   6.  Encounter for immunization Z23 V03.89 INFLUENZA VIRUS VAC QUAD,SPLIT,PRESV FREE SYRINGE IM   7. Xanthelasma of eyelid, bilateral H02.63 272.2     H02.66 374.51    8. Irritable bowel syndrome with diarrhea K58.0 564.1    9. Adult BMI 27.0-27.9 kg/sq m Z68.27 V85.23        Follow-up Disposition:  Return in about 3 months (around 2/28/2018) for follow up on chronic care, review bentyl and let me know if you want this. Advised her to call back or return to office if symptoms worsen/change/persist.  Discussed expected course/resolution/complications of diagnosis in detail with patient. Medication risks/benefits/costs/interactions/alternatives discussed with patient. She was given an after visit summary which includes diagnoses, current medications, & vitals. She expressed understanding with the diagnosis and plan. Discussed the patient's BMI with her. The BMI follow up plan is as follows:     dietary management education, guidance, and counseling  encourage exercise  monitor weight  prescribed dietary intake    An After Visit Summary was printed and given to the patient.

## 2017-11-29 NOTE — PROGRESS NOTES
Room 5    Chief Complaint   Patient presents with    Labs     Fasting labs    Eye Problem     Patient states she has some bumps on lower eye lids     1. Have you been to the ER, urgent care clinic since your last visit? Hospitalized since your last visit? No    2. Have you seen or consulted any other health care providers outside of the [address] [place] [address] since your last visit? Include any pap smears or colon screening.  No      Health Maintenance Due   Topic Date Due    Influenza Age 5 to Adult  08/01/2017

## 2017-11-29 NOTE — MR AVS SNAPSHOT
Visit Information Date & Time Provider Department Dept. Phone Encounter #  
 11/29/2017  9:00 AM Mera Estrella NP [facility] Internal Medicine [phone] [id number] Follow-up Instructions Return in about 3 months (around 2/28/2018) for follow up on chronic care, review bentyl and let me know if you want this. Upcoming Health Maintenance Date Due Influenza Age 5 to Adult 8/1/2017 PAP AKA CERVICAL CYTOLOGY 2/22/2019 DTaP/Tdap/Td series (2 - Td) 2/22/2026 Allergies as of 11/29/2017  Review Complete On: 11/29/2017 By: Mera Estrella NP Severity Noted Reaction Type Reactions Latex  02/03/2016    Rash  
 Sulfa (Sulfonamide Antibiotics) High 02/03/2016    Anaphylaxis Adhesive Tape-silicones  67/44/4650    Rash Morphine  02/03/2016    Rash Neosporin [Benzalkonium Chloride]  08/18/2016    Rash Current Immunizations  Reviewed on 2/23/2016 Name Date Influenza Vaccine (Quad) PF  Incomplete, 2/3/2016 Pneumococcal Polysaccharide (PPSV-23) 9/1/2017 Tdap 2/22/2016 Not reviewed this visit You Were Diagnosed With   
  
 Codes Comments Elevated ALT measurement    -  Primary ICD-10-CM: R74.0 ICD-9-CM: 790.4 Hyperbilirubinemia     ICD-10-CM: E80.6 ICD-9-CM: 782.4 Hyperlipidemia, unspecified hyperlipidemia type     ICD-10-CM: E78.5 ICD-9-CM: 272.4 PTSD (post-traumatic stress disorder)     ICD-10-CM: F43.10 ICD-9-CM: 309.81 Anxiety     ICD-10-CM: F41.9 ICD-9-CM: 300.00 Encounter for immunization     ICD-10-CM: A40 ICD-9-CM: V03.89 Vitals BP Pulse Temp Resp Height(growth percentile) Weight(growth percentile) 113/70 (BP 1 Location: Left arm, BP Patient Position: Sitting) 75 97.4 °F (36.3 °C) (Oral) 18 5' 9\" (1.753 m) 183 lb 9.6 oz (83.3 kg) LMP SpO2 BMI OB Status Smoking Status 03/18/2016 98% 27.11 kg/m2 Hysterectomy Former Smoker Vitals History BMI and BSA Data Body Mass Index Body Surface Area  
 27.11 kg/m 2 2.01 m 2 Preferred Pharmacy Pharmacy Name Phone [facility]/PH[facility]Y #[id number]- [facility] [address] AT [address] [place] [address] [phone] Your Updated Medication List  
  
   
This list is accurate as of: 11/29/17  9:29 AM.  Always use your most recent med list.  
  
  
  
  
 aspirin delayed-release 81 mg tablet Take  by mouth daily. cholecalciferol 1,000 unit tablet Commonly known as:  VITAMIN D3 Take 1 Tab by mouth daily. LORazepam 1 mg tablet Commonly known as:  ATIVAN Take 1 Tab by mouth two (2) times a day. Max Daily Amount: 2 mg. Prn anxiety  
  
 sertraline 100 mg tablet Commonly known as:  ZOLOFT Take 1 Tab by mouth daily. ZYRTEC PO Take  by mouth as needed. Prescriptions Sent to Pharmacy Refills  
 sertraline (ZOLOFT) 100 mg tablet 3 Sig: Take 1 Tab by mouth daily. Class: Normal  
 Pharmacy: [facility]/pharmacy #[id number]- [place], [place] - [address] AT [address] [place] [address] Ph #: [phone] Route: Oral  
  
We Performed the Following INFLUENZA VIRUS VAC QUAD,SPLIT,PRESV FREE SYRINGE IM [id number] CPT(R)] LIPID PANEL [37556 CPT(R)] METABOLIC PANEL, COMPREHENSIVE [23867 CPT(R)] Follow-up Instructions Return in about 3 months (around 2/28/2018) for follow up on chronic care, review bentyl and let me know if you want this. To-Do List   
 12/19/2017 8:30 AM  
  Appointment with [facility] at [facility] ([phone]) Shower or bathe using soap and water. Do not use deodorant, powder, perfumes, or lotion the day of your exam.  If your prior mammograms were not performed at [facility] please bring films with you or forward prior images 2 days before your procedure.   If patient is not a callback diagnostic, the patient must have an order/script from the physician for the diagnostic. Please bring it on the day of the mammogram or have it faxed to the department. [facility]  [phone] [facility] [phone] SANDRA  [phone] [facility] [phone] [facility] [phone] [facility] [phone] Amy Mendoza [phone]  
  
 12/19/2017 9:00 AM  
  Appointment with [address] [place] [address] at [facility] ([phone]) Shower or bathe using soap and water. Do not use deodorant, powder, perfumes, or lotion. If your prior films were not performed at a local [facility] facility please bring or forward prior images 2 days before procedure. Patient Instructions Dicyclomine (By mouth) Dicyclomine (dye-SYE-kloe-meen) Treats irritable bowel syndrome. Brand Name(s): Bentyl There may be other brand names for this medicine. When This Medicine Should Not Be Used: You should not use this medicine if you have had an allergic reaction to dicyclomine. Do not use this medicine if you have glaucoma, myasthenia gravis, or trouble urinating because of a blockage (such as an enlarged prostate). Make sure your doctor knows about all digestion problems you have, including reflux esophagitis (GERD), or severe ulcerative colitis. You should not use this medicine if you are breast feeding. This medicine should not be given to infants less than 6 months old. How to Use This Medicine:  
Capsule, Tablet, Long Acting Tablet, Liquid · Take your medicine as directed. Your dose may need to be changed several times to find what works best for you. · Swallow the extended-release tablet whole. Do not break, crush or chew. · Measure the oral liquid medicine with a marked measuring spoon, oral syringe, or medicine cup. If a dose is missed: · Take a dose as soon as you remember. If it is almost time for your next dose, wait until then and take a regular dose. Do not take extra medicine to make up for a missed dose. How to Store and Dispose of This Medicine: · Store the medicine in a closed container at room temperature, away from heat, moisture, and direct light. Do not freeze the oral liquid. · Ask your pharmacist, doctor, or health caregiver about the best way to dispose of any outdated medicine or medicine no longer needed. · Keep all medicine out of the reach of children. Never share your medicine with anyone. Drugs and Foods to Avoid: Ask your doctor or pharmacist before using any other medicine, including over-the-counter medicines, vitamins, and herbal products. · Make sure your doctor knows if you are using amantadine (Symmetrel®), digoxin (Lanoxin®), or a belladonna medicine such as atropine, hyoscyamine, scopolamine, Anaspaz®, Arco-Lase® Plus, or Donnatal®. Tell your doctor if you are using an MAO inhibitor such as Eldepryl®, Marplan®, [place], or Parnate®. · Tell your doctor if you are also using narcotic pain medicine, medicine for heart rhythm problems, an antihistamine, medicine to treat depression, phenothiazines (medicines to treat certain mental problems or severe nausea or vomiting), or a steroid medicine. Meperidine (Demerol®) is a narcotic pain medicine. Some medicines for heart rhythm problems are disopyramide, procainamide, quinidine, Cardioquin®, Norpace®, Procanbid®, or Carylon Leys. Diphenhydramine (Benadryl®) is an antihistamine. Some phenothiazine medicines are Compazine®, Mellaril®, Phenergan®, Serentil®, Tacaryl®, Thorazine®, or Trilafon®. Some medicines to treat depression are amitriptyline, nortriptyline, Norpramin®, or Vivactil®. Cortisone and prednisone are steroid medicines. · You should not use dicyclomine within 2 to 3 hours of taking antacids (Maalox®, Mylanta®) or medicine to stop diarrhea. Warnings While Using This Medicine: · Make sure your doctor knows if you are pregnant, if you have liver disease, kidney disease, or overactive thyroid.  Tell your doctor about any heart or blood vessel problems you have, including heart rhythm problems, congestive heart failure, or high blood pressure. Make sure your doctor knows if you have ongoing diarrhea, or an ileostomy or colostomy. Tell your doctor if you have autonomic neuropathy (a nerve problem), or a hiatal hernia (problems with your esophagus). · This medicine may make you sweat less. Your body could get too hot if you do not sweat enough. Stay out of hot places. Try to stay indoors or somewhere cool during hot weather. If you have a fever, call your doctor for advice. If your body gets too hot, you might feel dizzy, weak, tired, or confused. You might have an upset stomach or vomit. Call your doctor if you are too hot and cannot cool down. · This medicine may make you drowsy. Avoid driving, using machines, or doing anything else that could be dangerous if you are not alert. · Your eyes may be more sensitive to bright light while you are using this medicine. You may want to wear sunglasses in bright sunlight. Possible Side Effects While Using This Medicine:  
Call your doctor right away if you notice any of these side effects: · Allergic reaction: Itching or hives, swelling in your face or hands, swelling or tingling in your mouth or throat, chest tightness, trouble breathing · Fast or uneven heartbeat. · Restlessness, agitation, or confusion. · Severe dizziness or light-headedness. If you notice these less serious side effects, talk with your doctor: · Decrease in how much or how often you urinate. · Drowsiness or weakness. · Dry mouth. · Nausea, vomiting, constipation, or stomach pain. · Trouble focusing or other vision changes. If you notice other side effects that you think are caused by this medicine, tell your doctor. Call your doctor for medical advice about side effects. You may report side effects to FDA at 0-180-ZDZ-4313 © 2017 [facility] Information is for End User's use only and may not be sold, redistributed or otherwise used for commercial purposes. The above information is an  only. It is not intended as medical advice for individual conditions or treatments. Talk to your doctor, nurse or pharmacist before following any medical regimen to see if it is safe and effective for you. Introducing [facility]! Dear Debby Zamorano: 
Thank you for requesting a [email or web address] account. Our records indicate that you already have an active [email or web address] account. You can access your account anytime at [email or web address] Did you know that you can access your hospital and ER discharge instructions at any time in [email or web address]? You can also review all of your test results from your hospital stay or ER visit. Additional Information If you have questions, please visit the Frequently Asked Questions section of the [email or web address] website at [email or web address] Remember, [email or web address] is NOT to be used for urgent needs. For medical emergencies, dial 911. Now available from your iPhone and Android! Please provide this summary of care documentation to your next provider. Your primary care clinician is listed as Oz Garcia. If you have any questions after today's visit, please call [phone].

## 2017-11-29 NOTE — PATIENT INSTRUCTIONS
Dicyclomine (By mouth)   Dicyclomine (dye-SYE-kloe-meen)  Treats irritable bowel syndrome. Brand Name(s): Bentyl   There may be other brand names for this medicine. When This Medicine Should Not Be Used: You should not use this medicine if you have had an allergic reaction to dicyclomine. Do not use this medicine if you have glaucoma, myasthenia gravis, or trouble urinating because of a blockage (such as an enlarged prostate). Make sure your doctor knows about all digestion problems you have, including reflux esophagitis (GERD), or severe ulcerative colitis. You should not use this medicine if you are breast feeding. This medicine should not be given to infants less than 6 months old. How to Use This Medicine:   Capsule, Tablet, Long Acting Tablet, Liquid  · Take your medicine as directed. Your dose may need to be changed several times to find what works best for you. · Swallow the extended-release tablet whole. Do not break, crush or chew. · Measure the oral liquid medicine with a marked measuring spoon, oral syringe, or medicine cup. If a dose is missed:   · Take a dose as soon as you remember. If it is almost time for your next dose, wait until then and take a regular dose. Do not take extra medicine to make up for a missed dose. How to Store and Dispose of This Medicine:   · Store the medicine in a closed container at room temperature, away from heat, moisture, and direct light. Do not freeze the oral liquid. · Ask your pharmacist, doctor, or health caregiver about the best way to dispose of any outdated medicine or medicine no longer needed. · Keep all medicine out of the reach of children. Never share your medicine with anyone. Drugs and Foods to Avoid:   Ask your doctor or pharmacist before using any other medicine, including over-the-counter medicines, vitamins, and herbal products.   · Make sure your doctor knows if you are using amantadine (Symmetrel®), digoxin (Lanoxin®), or a belladonna medicine such as atropine, hyoscyamine, scopolamine, Anaspaz®, Arco-Lase® Plus, or Donnatal®. Tell your doctor if you are using an MAO inhibitor such as Eldepryl®, Marplan®, [place], or Parnate®. · Tell your doctor if you are also using narcotic pain medicine, medicine for heart rhythm problems, an antihistamine, medicine to treat depression, phenothiazines (medicines to treat certain mental problems or severe nausea or vomiting), or a steroid medicine. Meperidine (Demerol®) is a narcotic pain medicine. Some medicines for heart rhythm problems are disopyramide, procainamide, quinidine, Cardioquin®, Norpace®, Procanbid®, or Aysha Farideh. Diphenhydramine (Benadryl®) is an antihistamine. Some phenothiazine medicines are Compazine®, Mellaril®, Phenergan®, Serentil®, Tacaryl®, Thorazine®, or Trilafon®. Some medicines to treat depression are amitriptyline, nortriptyline, Norpramin®, or Vivactil®. Cortisone and prednisone are steroid medicines. · You should not use dicyclomine within 2 to 3 hours of taking antacids (Maalox®, Mylanta®) or medicine to stop diarrhea. Warnings While Using This Medicine:   · Make sure your doctor knows if you are pregnant, if you have liver disease, kidney disease, or overactive thyroid. Tell your doctor about any heart or blood vessel problems you have, including heart rhythm problems, congestive heart failure, or high blood pressure. Make sure your doctor knows if you have ongoing diarrhea, or an ileostomy or colostomy. Tell your doctor if you have autonomic neuropathy (a nerve problem), or a hiatal hernia (problems with your esophagus). · This medicine may make you sweat less. Your body could get too hot if you do not sweat enough. Stay out of hot places. Try to stay indoors or somewhere cool during hot weather. If you have a fever, call your doctor for advice. If your body gets too hot, you might feel dizzy, weak, tired, or confused. You might have an upset stomach or vomit.  Call your doctor if you are too hot and cannot cool down. · This medicine may make you drowsy. Avoid driving, using machines, or doing anything else that could be dangerous if you are not alert. · Your eyes may be more sensitive to bright light while you are using this medicine. You may want to wear sunglasses in bright sunlight. Possible Side Effects While Using This Medicine:   Call your doctor right away if you notice any of these side effects:  · Allergic reaction: Itching or hives, swelling in your face or hands, swelling or tingling in your mouth or throat, chest tightness, trouble breathing  · Fast or uneven heartbeat. · Restlessness, agitation, or confusion. · Severe dizziness or light-headedness. If you notice these less serious side effects, talk with your doctor:   · Decrease in how much or how often you urinate. · Drowsiness or weakness. · Dry mouth. · Nausea, vomiting, constipation, or stomach pain. · Trouble focusing or other vision changes. If you notice other side effects that you think are caused by this medicine, tell your doctor. Call your doctor for medical advice about side effects. You may report side effects to FDA at 5-464-FDA-6565  © 2017 [facility] Information is for End User's use only and may not be sold, redistributed or otherwise used for commercial purposes. The above information is an  only. It is not intended as medical advice for individual conditions or treatments. Talk to your doctor, nurse or pharmacist before following any medical regimen to see if it is safe and effective for you.

## 2017-11-30 LAB
ALBUMIN SERPL-MCNC: 4.7 G/DL (ref 3.5–5.5)
ALBUMIN/GLOB SERPL: 1.9 {RATIO} (ref 1.2–2.2)
ALP SERPL-CCNC: 72 IU/L (ref 39–117)
ALT SERPL-CCNC: 40 IU/L (ref 0–32)
AST SERPL-CCNC: 25 IU/L (ref 0–40)
BILIRUB SERPL-MCNC: 1.1 MG/DL (ref 0–1.2)
BUN SERPL-MCNC: 10 MG/DL (ref 6–24)
BUN/CREAT SERPL: 15 (ref 9–23)
CALCIUM SERPL-MCNC: 9.6 MG/DL (ref 8.7–10.2)
CHLORIDE SERPL-SCNC: 102 MMOL/L (ref 96–106)
CHOLEST SERPL-MCNC: 287 MG/DL (ref 100–199)
CO2 SERPL-SCNC: 24 MMOL/L (ref 18–29)
COMMENT, 011824: ABNORMAL
CREAT SERPL-MCNC: 0.66 MG/DL (ref 0.57–1)
GFR SERPLBLD CREATININE-BSD FMLA CKD-EPI: 110 ML/MIN/1.73
GFR SERPLBLD CREATININE-BSD FMLA CKD-EPI: 127 ML/MIN/1.73
GLOBULIN SER CALC-MCNC: 2.5 G/DL (ref 1.5–4.5)
GLUCOSE SERPL-MCNC: 91 MG/DL (ref 65–99)
HDLC SERPL-MCNC: 54 MG/DL
LDLC SERPL CALC-MCNC: 199 MG/DL (ref 0–99)
POTASSIUM SERPL-SCNC: 4.7 MMOL/L (ref 3.5–5.2)
PROT SERPL-MCNC: 7.2 G/DL (ref 6–8.5)
SODIUM SERPL-SCNC: 142 MMOL/L (ref 134–144)
TRIGL SERPL-MCNC: 168 MG/DL (ref 0–149)
VLDLC SERPL CALC-MCNC: 34 MG/DL (ref 5–40)

## 2017-11-30 NOTE — PROGRESS NOTES
Liver function still elevated (ALT) but this shows improvement compared to previous levels. Cholesterol has worsened with the total being 287 (previous was 203), triglycerides at 168 (previous was 122), and LDL at 199 (previously at 134).

## 2017-12-06 DIAGNOSIS — F43.10 PTSD (POST-TRAUMATIC STRESS DISORDER): Primary | ICD-10-CM

## 2017-12-06 RX ORDER — SERTRALINE HYDROCHLORIDE 100 MG/1
200 TABLET, FILM COATED ORAL DAILY
Qty: 60 TAB | Refills: 1
Start: 2017-12-06 | End: 2018-02-18 | Stop reason: SDUPTHER

## 2017-12-08 DIAGNOSIS — E78.5 HYPERLIPIDEMIA, UNSPECIFIED HYPERLIPIDEMIA TYPE: Primary | ICD-10-CM

## 2017-12-08 RX ORDER — ROSUVASTATIN CALCIUM 20 MG/1
20 TABLET, COATED ORAL
Qty: 30 TAB | Refills: 2 | Status: SHIPPED | OUTPATIENT
Start: 2017-12-08 | End: 2018-04-19

## 2017-12-19 ENCOUNTER — HOSPITAL ENCOUNTER (OUTPATIENT)
Dept: MAMMOGRAPHY | Age: 42
Discharge: HOME | End: 2017-12-19
Attending: NURSE PRACTITIONER
Payer: COMMERCIAL

## 2017-12-19 DIAGNOSIS — R92.8 ABNORMAL MAMMOGRAM: ICD-10-CM

## 2017-12-19 PROCEDURE — 77065 DX MAMMO INCL CAD UNI: CPT

## 2018-02-02 DIAGNOSIS — F41.9 ANXIETY: Primary | ICD-10-CM

## 2018-02-05 RX ORDER — LORAZEPAM 1 MG/1
1 TABLET ORAL 2 TIMES DAILY
Qty: 30 TAB | Refills: 0 | Status: SHIPPED | OUTPATIENT
Start: 2018-02-05 | End: 2018-07-17 | Stop reason: SDUPTHER

## 2018-02-05 NOTE — TELEPHONE ENCOUNTER
From: Billy Fritz  To: Jose G Wills NP  Sent: 2/2/2018 4:35 PM EST  Subject: Medication Renewal Request    Original authorizing provider: SIGIFREDO Greer would like a refill of the following medications:  LORazepam (ATIVAN) 1 mg tablet Jose G Wills NP]    Preferred pharmacy: [facility]/PHARMACY #[id number] [address] AT [facility] OF [address] [place] [address]    Comment:

## 2018-02-18 DIAGNOSIS — F43.10 PTSD (POST-TRAUMATIC STRESS DISORDER): ICD-10-CM

## 2018-02-19 RX ORDER — SERTRALINE HYDROCHLORIDE 100 MG/1
200 TABLET, FILM COATED ORAL DAILY
Qty: 60 TAB | Refills: 1 | Status: SHIPPED | OUTPATIENT
Start: 2018-02-19 | End: 2018-04-16 | Stop reason: SDUPTHER

## 2018-02-19 NOTE — TELEPHONE ENCOUNTER
From: Robinson Doyle  To: Vianey Charles NP  Sent: 2/18/2018 10:52 AM EST  Subject: Medication Renewal Request    Original authorizing provider: SIGIFREDO You would like a refill of the following medications:  sertraline (ZOLOFT) 100 mg tablet Vianey Charles NP]    Preferred pharmacy: [facility]/PHARMACY #[id number] [address] AT [address]    Comment:  Good morning :) Since I've been taking 200mg of Zoloft daily (still doing well on that dosage), I ran out of the prescription too early for the insurance to allow a refill. [facility] gave me a complimentary refill for today. The pharmacist said they would contact you about the refill but I just wanted to give you a heads up.  Thanks, Jones Mccabe

## 2018-04-16 DIAGNOSIS — F43.10 PTSD (POST-TRAUMATIC STRESS DISORDER): ICD-10-CM

## 2018-04-16 RX ORDER — SERTRALINE HYDROCHLORIDE 100 MG/1
TABLET, FILM COATED ORAL
Qty: 60 TAB | Refills: 1 | Status: SHIPPED | OUTPATIENT
Start: 2018-04-16 | End: 2018-07-17 | Stop reason: SDUPTHER

## 2018-04-19 ENCOUNTER — OFFICE VISIT (OUTPATIENT)
Dept: INTERNAL MEDICINE CLINIC | Facility: CLINIC | Age: 43
End: 2018-04-19

## 2018-04-19 VITALS
HEIGHT: 69 IN | SYSTOLIC BLOOD PRESSURE: 127 MMHG | BODY MASS INDEX: 27.11 KG/M2 | WEIGHT: 183 LBS | HEART RATE: 87 BPM | RESPIRATION RATE: 18 BRPM | TEMPERATURE: 99.3 F | DIASTOLIC BLOOD PRESSURE: 78 MMHG

## 2018-04-19 DIAGNOSIS — R92.8 ABNORMAL MAMMOGRAM: ICD-10-CM

## 2018-04-19 DIAGNOSIS — F32.A ANXIETY AND DEPRESSION: ICD-10-CM

## 2018-04-19 DIAGNOSIS — R41.3 MEMORY CHANGES: ICD-10-CM

## 2018-04-19 DIAGNOSIS — E78.5 HYPERLIPIDEMIA, UNSPECIFIED HYPERLIPIDEMIA TYPE: Primary | ICD-10-CM

## 2018-04-19 DIAGNOSIS — R74.01 ELEVATED ALT MEASUREMENT: ICD-10-CM

## 2018-04-19 DIAGNOSIS — F41.9 ANXIETY AND DEPRESSION: ICD-10-CM

## 2018-04-19 NOTE — PROGRESS NOTES
Subjective:      Raven Guzman is a 40 y.o. female who presents today for follow up. Cardiovascular Review  The patient has hyperlipidemia. Since last visit: she has not been taking the crestor due to cost.  She reports taking medications as instructed, no medication side effects noted, no TIA's, no chest pain on exertion, no dyspnea on exertion, no swelling of ankles, no palpitations. Diet and Lifestyle: generally follows a low fat low cholesterol diet, generally follows a low sodium diet, exercises regularly. Labs: labs pending, reviewed and discussed with patient. Mental Health Review  Patient is seen for anxiety disorder, post traumatic stress  disorder. Since last visit: she is quitting her job and pursuing legal discrimination suit against company. She states she feels so much better now that she out of that toxic environment. Ongoing symptoms include: feelings of losing control, difficulty concentrating. She denies: SI/SA. Memory and coordination changes: he states that she is noting significant memory deficit since her syncopal episode in the fall. She states she loses things frequently and also have a new coordination deficit. She states she is breaking things and feels clumsy. She also notes that she will think she is holding something but she isn't. She denies any recent trauma. Overall symptoms are worsening, she states it has become apparent to her wife and friends.  She was evaluated by rheumatology but was cleared, history of positive MAURICIO     Patient Active Problem List    Diagnosis Date Noted    Abnormal mammogram 12/19/2017    PTSD (post-traumatic stress disorder) 11/29/2017    Irritable bowel syndrome with diarrhea 11/29/2017    Adult BMI 27.0-27.9 kg/sq m 10/18/2017    Elevated BP without diagnosis of hypertension 09/18/2017    Hyperbilirubinemia 09/05/2017    Elevated ALT measurement 09/05/2017    Family history of exposure to Agent Orange 12/21/2016    Cellulitis of left upper extremity 08/19/2016    Arthralgia 08/19/2016    Positive MAURICIO (antinuclear antibody) 08/19/2016    Hyperlipidemia 08/19/2016    Vitamin D deficiency 08/19/2016    Anxiety 08/19/2016    DDD (degenerative disc disease), lumbar 08/19/2016    Colitis 08/19/2016    Mild intermittent asthma without complication 13/44/3818    Fibroids 03/23/2016     Current Outpatient Prescriptions   Medication Sig Dispense Refill    sertraline (ZOLOFT) 100 mg tablet TAKE 2 TABS BY MOUTH DAILY. 60 Tab 1    LORazepam (ATIVAN) 1 mg tablet Take 1 Tab by mouth two (2) times a day. Max Daily Amount: 2 mg. Prn anxiety  Indications: anxiety 30 Tab 0    aspirin delayed-release 81 mg tablet Take  by mouth daily.  CETIRIZINE HCL (ZYRTEC PO) Take  by mouth as needed.  cholecalciferol (VITAMIN D3) 1,000 unit tablet Take 1 Tab by mouth daily.  60 Tab 11     Allergies   Allergen Reactions    Latex Rash    Sulfa (Sulfonamide Antibiotics) Anaphylaxis    Adhesive Tape-Silicones Rash    Morphine Rash    Neosporin [Benzalkonium Chloride] Rash     Past Medical History:   Diagnosis Date    Adverse effect of anesthesia     hard to wake up    Anxiety     Arthritis     back    Asperger syndrome     Asthma     Hypercholesterolemia     IBD (inflammatory bowel disease)     Ill-defined condition     discomfort hips shoulders knees and feet and hands MAURICIO was positive    Irritable bowel disease 1995    Psychiatric disorder     anxiety and depression     Family History   Problem Relation Age of Onset    Cancer Mother     Heart Disease Father     Depression Father     Cancer Sister     Alzheimer Maternal Grandmother     Diabetes Maternal Grandfather     Heart Surgery Paternal Grandmother     Heart Surgery Paternal Grandfather      Social History   Substance Use Topics    Smoking status: Former Smoker     Packs/day: 1.00     Years: 10.00     Quit date: 4/7/2004    Smokeless tobacco: Never Used    Alcohol use No Review of Systems    A comprehensive review of systems was negative except for that written in the HPI. Objective:     Visit Vitals    /78    Pulse 87    Temp 99.3 °F (37.4 °C) (Oral)    Resp 18    Ht 5' 9\" (1.753 m)    Wt 183 lb (83 kg)    LMP 03/18/2016    BMI 27.02 kg/m2     General appearance: alert, cooperative, no distress, appears stated age  Head: Normocephalic, without obvious abnormality, atraumatic  Eyes: conjunctivae/corneas clear. PERRL, EOM's intact. Fundi benign  Back: symmetric, no curvature. ROM normal. No CVA tenderness. Lungs: clear to auscultation bilaterally  Heart: regular rate and rhythm, S1, S2 normal, no murmur, click, rub or gallop  Extremities: extremities normal, atraumatic, no cyanosis or edema  Pulses: 2+ and symmetric  Skin: Skin color, texture, turgor normal. No rashes or lesions  Neurologic: Alert and oriented X 3, normal strength and tone. Normal symmetric reflexes. Normal coordination and gait  Mental status: Alert, oriented, thought content appropriate  Cranial nerves: normal  Sensory: normal  Motor:grossly normal  Coordination: normal  Gait: Normal  Psych: appropriate mood, speech, affect  Nursing note and vitals reviewed  Assessment/Plan:       ICD-10-CM ICD-9-CM    1. Hyperlipidemia, unspecified hyperlipidemia type E78.5 272.4 LIPID PANEL   2. Elevated ALT measurement R74.0 790.4 HEPATIC FUNCTION PANEL   3. Abnormal mammogram R92.8 793.80 RAFAEL MAMMO BI SCREENING INCL CAD   4. Memory changes R41.3 780.93 REFERRAL TO NEUROPSYCHOLOGY   5. Anxiety and depression F41.9 300.00     F32.9 311    She will follow up with neurology regarding memory and coordination changes. Referral to neuropsych placed. Future mammo ordered. Labs pending. Follow-up Disposition:  Return in about 3 months (around 7/19/2018) for depening on labs.          Advised her to call back or return to office if symptoms worsen/change/persist.  Discussed expected course/resolution/complications of diagnosis in detail with patient. Medication risks/benefits/costs/interactions/alternatives discussed with patient. She was given an after visit summary which includes diagnoses, current medications, & vitals. She expressed understanding with the diagnosis and plan.

## 2018-04-19 NOTE — PROGRESS NOTES
Chief Complaint   Patient presents with    Follow-up           1. Have you been to the ER, urgent care clinic since your last visit? Hospitalized since your last visit? No    2. Have you seen or consulted any other health care providers outside of the [address] [place] [address] since your last visit? Include any pap smears or colon screening.  No

## 2018-04-20 DIAGNOSIS — E78.5 HYPERLIPIDEMIA, UNSPECIFIED HYPERLIPIDEMIA TYPE: Primary | ICD-10-CM

## 2018-04-20 LAB
ALBUMIN SERPL-MCNC: 4.8 G/DL (ref 3.5–5.5)
ALP SERPL-CCNC: 81 IU/L (ref 39–117)
ALT SERPL-CCNC: 38 IU/L (ref 0–32)
AST SERPL-CCNC: 28 IU/L (ref 0–40)
BILIRUB DIRECT SERPL-MCNC: 0.12 MG/DL (ref 0–0.4)
BILIRUB SERPL-MCNC: 0.6 MG/DL (ref 0–1.2)
CHOLEST SERPL-MCNC: 316 MG/DL (ref 100–199)
HDLC SERPL-MCNC: 55 MG/DL
LDLC SERPL CALC-MCNC: 222 MG/DL (ref 0–99)
PROT SERPL-MCNC: 7.4 G/DL (ref 6–8.5)
TRIGL SERPL-MCNC: 197 MG/DL (ref 0–149)
VLDLC SERPL CALC-MCNC: 39 MG/DL (ref 5–40)

## 2018-04-20 RX ORDER — ATORVASTATIN CALCIUM 20 MG/1
20 TABLET, FILM COATED ORAL DAILY
Qty: 30 TAB | Refills: 3 | Status: SHIPPED | OUTPATIENT
Start: 2018-04-20 | End: 2018-06-26 | Stop reason: SDUPTHER

## 2018-07-09 ENCOUNTER — HOSPITAL ENCOUNTER (OUTPATIENT)
Dept: MAMMOGRAPHY | Age: 43
Discharge: HOME | End: 2018-07-09
Attending: NURSE PRACTITIONER
Payer: COMMERCIAL

## 2018-07-09 DIAGNOSIS — Z12.31 VISIT FOR SCREENING MAMMOGRAM: ICD-10-CM

## 2018-07-09 PROCEDURE — 77063 BREAST TOMOSYNTHESIS BI: CPT

## 2018-07-17 ENCOUNTER — OFFICE VISIT (OUTPATIENT)
Dept: INTERNAL MEDICINE CLINIC | Facility: CLINIC | Age: 43
End: 2018-07-17

## 2018-07-17 ENCOUNTER — LAB ONLY (OUTPATIENT)
Dept: INTERNAL MEDICINE CLINIC | Facility: CLINIC | Age: 43
End: 2018-07-17

## 2018-07-17 VITALS
BODY MASS INDEX: 27.4 KG/M2 | WEIGHT: 185 LBS | HEIGHT: 69 IN | OXYGEN SATURATION: 98 % | HEART RATE: 88 BPM | DIASTOLIC BLOOD PRESSURE: 84 MMHG | TEMPERATURE: 98 F | RESPIRATION RATE: 18 BRPM | SYSTOLIC BLOOD PRESSURE: 123 MMHG

## 2018-07-17 DIAGNOSIS — R76.8 POSITIVE ANA (ANTINUCLEAR ANTIBODY): ICD-10-CM

## 2018-07-17 DIAGNOSIS — R23.3 EASY BRUISING: ICD-10-CM

## 2018-07-17 DIAGNOSIS — F43.10 PTSD (POST-TRAUMATIC STRESS DISORDER): ICD-10-CM

## 2018-07-17 DIAGNOSIS — Z13.0 SCREENING, ANEMIA, DEFICIENCY, IRON: ICD-10-CM

## 2018-07-17 DIAGNOSIS — E78.5 HYPERLIPIDEMIA, UNSPECIFIED HYPERLIPIDEMIA TYPE: ICD-10-CM

## 2018-07-17 DIAGNOSIS — M25.50 ARTHRALGIA, UNSPECIFIED JOINT: ICD-10-CM

## 2018-07-17 DIAGNOSIS — F41.9 ANXIETY: ICD-10-CM

## 2018-07-17 DIAGNOSIS — E55.9 VITAMIN D DEFICIENCY: ICD-10-CM

## 2018-07-17 DIAGNOSIS — R74.01 ELEVATED ALT MEASUREMENT: Primary | ICD-10-CM

## 2018-07-17 DIAGNOSIS — Z71.82 EXERCISE COUNSELING: ICD-10-CM

## 2018-07-17 RX ORDER — SERTRALINE HYDROCHLORIDE 100 MG/1
TABLET, FILM COATED ORAL
Qty: 240 TAB | Refills: 1 | Status: SHIPPED | OUTPATIENT
Start: 2018-07-17 | End: 2018-07-23 | Stop reason: SDUPTHER

## 2018-07-17 RX ORDER — LORAZEPAM 1 MG/1
1 TABLET ORAL 2 TIMES DAILY
Qty: 30 TAB | Refills: 0 | Status: SHIPPED | OUTPATIENT
Start: 2018-07-17 | End: 2018-12-19 | Stop reason: SDUPTHER

## 2018-07-17 RX ORDER — ATORVASTATIN CALCIUM 20 MG/1
20 TABLET, FILM COATED ORAL DAILY
Qty: 120 TAB | Refills: 3 | Status: SHIPPED | OUTPATIENT
Start: 2018-07-17 | End: 2019-07-29 | Stop reason: SDUPTHER

## 2018-07-17 NOTE — PROGRESS NOTES
Room 4    Chief Complaint   Patient presents with    Labs     Follow up     1. Have you been to the ER, urgent care clinic since your last visit? Hospitalized since your last visit? No    2. Have you seen or consulted any other health care providers outside of the [address] [place] [address] since your last visit? Include any pap smears or colon screening.  No

## 2018-07-17 NOTE — PROGRESS NOTES
Subjective:      Brandon Cheng is a 44 y.o. female who presents today for follow up. Patient initially on the schedule for lab only, orders and refills entered on that encounter. Cardiovascular Review  The patient has hyperlipidemia. Since last visit: no changes. She reports taking medications as instructed, no medication side effects noted. Diet and Lifestyle: generally follows a low fat low cholesterol diet, generally follows a low sodium diet, no formal exercise but active during the day. Labs: reviewed and discussed with patient. Labs pending. Arthralgia: history of positive MAURICIO that was evaluated by rheumatology Katy Gonzalez, she was told that symptoms are not due to an auto-immune disease and repeat MAURICIO was negative. She notes \"bone pain\" in shins and arms. She states pain is not muscle related and feels deep in bones. Symptoms have been ongoing for years. She does not know inciting incident, triggering events, or has found any treatment for pain. Symptoms are intermittent. Mental Health Review  Patient is seen for depression. Since last visit: she quit her job and has been looking for a new one, she is keeping routines but notes that she \"doesn't have anything to do\". She is still active with her wife and they have started an urban garden. She is on zoloft 200mg. Ongoing symptoms include depressed mood. Patient denies SI/SA. Reports experiences the following side effects from the treatment: none. Body mass index is 27.32 kg/(m^2). 2lb weight gain noted, encouraged more exercise.  Awaiting labs to check cholesterol  Wt Readings from Last 3 Encounters:   07/17/18 185 lb (83.9 kg)   04/19/18 183 lb (83 kg)   11/29/17 183 lb 9.6 oz (83.3 kg)       Patient Active Problem List    Diagnosis Date Noted    Abnormal mammogram 12/19/2017    PTSD (post-traumatic stress disorder) 11/29/2017    Irritable bowel syndrome with diarrhea 11/29/2017    Adult BMI 27.0-27.9 kg/sq m 10/18/2017    Elevated BP without diagnosis of hypertension 09/18/2017    Hyperbilirubinemia 09/05/2017    Elevated ALT measurement 09/05/2017    Family history of exposure to Agent Orange 12/21/2016    Cellulitis of left upper extremity 08/19/2016    Arthralgia 08/19/2016    Positive MAURICIO (antinuclear antibody) 08/19/2016    Hyperlipidemia 08/19/2016    Vitamin D deficiency 08/19/2016    Anxiety 08/19/2016    DDD (degenerative disc disease), lumbar 08/19/2016    Colitis 08/19/2016    Mild intermittent asthma without complication 21/98/9075    Fibroids 03/23/2016     Current Outpatient Prescriptions   Medication Sig Dispense Refill    atorvastatin (LIPITOR) 20 mg tablet Take 1 Tab by mouth daily. 120 Tab 3    sertraline (ZOLOFT) 100 mg tablet TAKE 2 TABS BY MOUTH DAILY. 240 Tab 1    LORazepam (ATIVAN) 1 mg tablet Take 1 Tab by mouth two (2) times a day. Max Daily Amount: 2 mg. Prn anxiety  Indications: anxiety 30 Tab 0    aspirin delayed-release 81 mg tablet Take  by mouth daily.  CETIRIZINE HCL (ZYRTEC PO) Take  by mouth as needed.  cholecalciferol (VITAMIN D3) 1,000 unit tablet Take 1 Tab by mouth daily. 60 Tab 11     Allergies   Allergen Reactions    Latex Rash    Sulfa (Sulfonamide Antibiotics) Anaphylaxis    Adhesive Tape-Silicones Rash    Morphine Rash    Neosporin [Benzalkonium Chloride] Rash     Past Medical History:   Diagnosis Date    Adverse effect of anesthesia     hard to wake up    Anxiety     Arthritis     back    Asperger syndrome     Asthma     Hypercholesterolemia     IBD (inflammatory bowel disease)     Ill-defined condition     discomfort hips shoulders knees and feet and hands MAURICIO was positive    Irritable bowel disease 1995    Psychiatric disorder     anxiety and depression        Review of Systems    A comprehensive review of systems was negative except for that written in the HPI.      Objective:     Visit Vitals    /84 (BP 1 Location: Left arm, BP Patient Position: Sitting)    Pulse 88    Temp 98 °F (36.7 °C) (Oral)    Resp 18    Ht 5' 9\" (1.753 m)    Wt 185 lb (83.9 kg)    LMP 03/18/2016    SpO2 98%    BMI 27.32 kg/m2     General appearance: alert, cooperative, no distress, appears stated age  Head: Normocephalic, without obvious abnormality, atraumatic  Eyes: conjunctivae/corneas clear. PERRL, EOM's intact. Fundi benign  Lungs: clear to auscultation bilaterally  Heart: regular rate and rhythm, S1, S2 normal, no murmur, click, rub or gallop  Extremities: extremities normal, atraumatic, no cyanosis or edema  Pulses: 2+ and symmetric  Skin: Skin color, texture, turgor normal. No rashes or lesions  Neurologic: Alert and oriented X 3, normal strength and tone. Normal symmetric reflexes. Normal coordination and gait  Psych: appropriate mood, speech, affect  Nursing note and vitals reviewed  Assessment/Plan:       ICD-10-CM ICD-9-CM    1. Elevated ALT measurement R74.0 790.4    2. Positive BENJIE (antinuclear antibody) R76.8 795.79    3. Arthralgia, unspecified joint M25.50 719.40    4. Adult BMI 27.0-27.9 kg/sq m Z68.27 V85.23    5. Easy bruising R23.8 782.9    6. Screening, anemia, deficiency, iron Z13.0 V78.0    7. Vitamin D deficiency E55.9 268.9    8. PTSD (post-traumatic stress disorder) F43.10 309.81    9. Anxiety F41.9 300.00    CBC, lipid, lft panel, vit d, benjie, rf, and esr pending (orders placed on lab only encounter from today that was scheduled in error). Follow-up Disposition:  Return in about 3 months (around 10/17/2018) for chronic care, arthralgia. Advised her to call back or return to office if symptoms worsen/change/persist.  Discussed expected course/resolution/complications of diagnosis in detail with patient. Medication risks/benefits/costs/interactions/alternatives discussed with patient. She was given an after visit summary which includes diagnoses, current medications, & vitals.   She expressed understanding with the diagnosis and plan.

## 2018-07-19 LAB
25(OH)D3+25(OH)D2 SERPL-MCNC: 28.1 NG/ML (ref 30–100)
ALBUMIN SERPL-MCNC: 5.1 G/DL (ref 3.5–5.5)
ALP SERPL-CCNC: 86 IU/L (ref 39–117)
ALT SERPL-CCNC: 29 IU/L (ref 0–32)
ANA SER QL: NEGATIVE
AST SERPL-CCNC: 23 IU/L (ref 0–40)
BASOPHILS # BLD AUTO: 0.1 X10E3/UL (ref 0–0.2)
BASOPHILS NFR BLD AUTO: 1 %
BILIRUB DIRECT SERPL-MCNC: 0.21 MG/DL (ref 0–0.4)
BILIRUB SERPL-MCNC: 1 MG/DL (ref 0–1.2)
CHOLEST SERPL-MCNC: 202 MG/DL (ref 100–199)
EOSINOPHIL # BLD AUTO: 0.4 X10E3/UL (ref 0–0.4)
EOSINOPHIL NFR BLD AUTO: 5 %
ERYTHROCYTE [DISTWIDTH] IN BLOOD BY AUTOMATED COUNT: 13.7 % (ref 12.3–15.4)
ERYTHROCYTE [SEDIMENTATION RATE] IN BLOOD BY WESTERGREN METHOD: 23 MM/HR (ref 0–32)
HCT VFR BLD AUTO: 45.1 % (ref 34–46.6)
HDLC SERPL-MCNC: 54 MG/DL
HGB BLD-MCNC: 15.3 G/DL (ref 11.1–15.9)
IMM GRANULOCYTES # BLD: 0 X10E3/UL (ref 0–0.1)
IMM GRANULOCYTES NFR BLD: 0 %
LDLC SERPL CALC-MCNC: 123 MG/DL (ref 0–99)
LYMPHOCYTES # BLD AUTO: 2.3 X10E3/UL (ref 0.7–3.1)
LYMPHOCYTES NFR BLD AUTO: 31 %
MCH RBC QN AUTO: 29.5 PG (ref 26.6–33)
MCHC RBC AUTO-ENTMCNC: 33.9 G/DL (ref 31.5–35.7)
MCV RBC AUTO: 87 FL (ref 79–97)
MONOCYTES # BLD AUTO: 0.4 X10E3/UL (ref 0.1–0.9)
MONOCYTES NFR BLD AUTO: 5 %
NEUTROPHILS # BLD AUTO: 4.4 X10E3/UL (ref 1.4–7)
NEUTROPHILS NFR BLD AUTO: 58 %
PLATELET # BLD AUTO: 237 X10E3/UL (ref 150–379)
PROT SERPL-MCNC: 7.7 G/DL (ref 6–8.5)
RBC # BLD AUTO: 5.18 X10E6/UL (ref 3.77–5.28)
RHEUMATOID FACT SERPL-ACNC: <10 IU/ML (ref 0–13.9)
SEE BELOW:, 164879: NORMAL
TRIGL SERPL-MCNC: 124 MG/DL (ref 0–149)
VLDLC SERPL CALC-MCNC: 25 MG/DL (ref 5–40)
WBC # BLD AUTO: 7.6 X10E3/UL (ref 3.4–10.8)

## 2018-07-19 NOTE — PROGRESS NOTES
Cholesterol shows a lot of improvement!  Vitamin D still low, increase vitamin D3 supplement to 5,000 units

## 2018-11-07 DIAGNOSIS — F41.9 ANXIETY: ICD-10-CM

## 2018-11-08 DIAGNOSIS — F41.9 ANXIETY: ICD-10-CM

## 2018-11-08 RX ORDER — SERTRALINE HYDROCHLORIDE 100 MG/1
TABLET, FILM COATED ORAL
Qty: 90 TAB | Refills: 3 | Status: SHIPPED | OUTPATIENT
Start: 2018-11-08 | End: 2019-08-08 | Stop reason: SDUPTHER

## 2018-11-08 RX ORDER — SERTRALINE HYDROCHLORIDE 100 MG/1
TABLET, FILM COATED ORAL
Qty: 90 TAB | Refills: 3 | Status: SHIPPED | OUTPATIENT
Start: 2018-11-08 | End: 2018-11-08 | Stop reason: SDUPTHER

## 2018-11-16 ENCOUNTER — TELEPHONE (OUTPATIENT)
Dept: INTERNAL MEDICINE CLINIC | Facility: CLINIC | Age: 43
End: 2018-11-16

## 2018-11-19 NOTE — TELEPHONE ENCOUNTER
Pt's wife called stating that she was having dizziness falls and just seemed off. With pt's history wanted to know if she should take her to ER. Patient was reluctant to go. Explained to wife to encourage her due to her symptoms it would be wise to er on the side of caution and go to ER. She voiced an understanding and would try to get her spouse to go.  Pietro Hubbard, SOLE

## 2018-12-19 DIAGNOSIS — F41.9 ANXIETY: ICD-10-CM

## 2018-12-20 RX ORDER — LORAZEPAM 1 MG/1
1 TABLET ORAL 2 TIMES DAILY
Qty: 30 TAB | Refills: 0 | Status: SHIPPED | OUTPATIENT
Start: 2018-12-20 | End: 2019-04-05 | Stop reason: SDUPTHER

## 2019-02-12 ENCOUNTER — OFFICE VISIT (OUTPATIENT)
Dept: NEUROLOGY | Age: 44
End: 2019-02-12

## 2019-02-12 VITALS
WEIGHT: 184 LBS | OXYGEN SATURATION: 98 % | DIASTOLIC BLOOD PRESSURE: 80 MMHG | SYSTOLIC BLOOD PRESSURE: 122 MMHG | BODY MASS INDEX: 27.25 KG/M2 | RESPIRATION RATE: 18 BRPM | HEART RATE: 87 BPM | HEIGHT: 69 IN

## 2019-02-12 DIAGNOSIS — H53.9 VISUAL CHANGES: Primary | ICD-10-CM

## 2019-02-12 DIAGNOSIS — M54.16 LUMBAR RADICULOPATHY, CHRONIC: ICD-10-CM

## 2019-02-12 NOTE — PROGRESS NOTES
Pt here for f/u from [facility]IA. Having trouble of finding her words at times. Has been very clumsy lately. L leg has become weak, ankle turns when she is just standing. Gets waves of unsteadiness. Seeing shadows when nothing is there. Depression screen completed. Score of 14.

## 2019-02-12 NOTE — PROGRESS NOTES
Chief Complaint Patient presents with  Neurologic Problem Vision changes and worsening left leg pain HPI 
 
42-year-old woman here to be seen for new issues. I last saw her September 2017 after she presented with an event of syncope preceded by possible TIA. Workup unrevealing to include MRI of the brain. She is been taking aspirin 81 mg ever since. She is here today because in the past year she has noticed some visual changes described as shadows to her right or left side transiently occurring randomly at least once a month. She cannot make out any clear figures as she describes them as being shadows. No headache. no double vision. Secondly she is complaining of worsening left leg pain due to her history of Lumbar disc radiculopathy. Pain is beginning in the left hip radiating down the side of the leg to the anterior knee. Sometimes her leg gives out. She has mild low back pain. She is a history of L4-L5 fusion. Review of Systems Constitutional: Negative for malaise/fatigue. Eyes: Negative for double vision. Musculoskeletal: Positive for back pain. Neurological: Positive for speech change and focal weakness. Negative for headaches. Psychiatric/Behavioral: Positive for hallucinations. The patient is nervous/anxious. All other systems reviewed and are negative. Past Medical History:  
Diagnosis Date  Adverse effect of anesthesia   
 hard to wake up  Anxiety  Arthritis   
 back  Asperger syndrome  Asthma  Depression  Hypercholesterolemia  IBD (inflammatory bowel disease)  Ill-defined condition   
 discomfort hips shoulders knees and feet and hands MAURICIO was positive  Irritable bowel disease 1995  Neurological disorder  Psychiatric disorder   
 anxiety and depression Family History Problem Relation Age of Onset  Cancer Mother  Heart Disease Father  Depression Father  Cancer Sister  Alzheimer Maternal Grandmother  Diabetes Maternal Grandfather  Heart Surgery Paternal Grandmother  Heart Surgery Paternal Grandfather Social History Socioeconomic History  Marital status:  Spouse name: Not on file  Number of children: Not on file  Years of education: Not on file  Highest education level: Not on file Social Needs  Financial resource strain: Not on file  Food insecurity - worry: Not on file  Food insecurity - inability: Not on file  Transportation needs - medical: Not on file  Transportation needs - non-medical: Not on file Occupational History  Occupation:  9th and 12th grade Tobacco Use  Smoking status: Former Smoker Packs/day: 1.00 Years: 10.00 Pack years: 10.00 Last attempt to quit: 2004 Years since quittin.8  Smokeless tobacco: Never Used Substance and Sexual Activity  Alcohol use: No  
 Drug use: Yes Types: Marijuana Comment: 2x/week  Sexual activity: Yes  
  Partners: Female Birth control/protection: None Other Topics Concern  Not on file Social History Narrative Lives with wife.  in 2013. Together since .   
 1 cat. Allergies Allergen Reactions  Latex Rash  Sulfa (Sulfonamide Antibiotics) Anaphylaxis  Adhesive Tape-Silicones Rash  Morphine Rash  Neosporin [Benzalkonium Chloride] Rash Current Outpatient Medications Medication Sig  LORazepam (ATIVAN) 1 mg tablet Take 1 Tab by mouth two (2) times a day. Max Daily Amount: 2 mg. Prn anxiety  sertraline (ZOLOFT) 100 mg tablet TAKE TWO TABLETS BY MOUTH EVERY DAY  atorvastatin (LIPITOR) 20 mg tablet Take 1 Tab by mouth daily.  aspirin delayed-release 81 mg tablet Take  by mouth daily.  CETIRIZINE HCL (ZYRTEC PO) Take  by mouth as needed.  cholecalciferol (VITAMIN D3) 1,000 unit tablet Take 1 Tab by mouth daily. No current facility-administered medications for this visit. Neurologic Exam  
 
Mental Status WD/WN adult in NAD, normal grooming VSS 
A&O x 3 PERRL, nonicteric, EOMI Face is symmetric, tongue midline Speech is fluent and clear No limb ataxia. No abnl movements. Moving all extemities spontaneously and symmetric Normal gait Mild atrophy left calf compared to the right CVS RRR Lungs nonlabored Skin is warm and dry Visit Vitals /80 Pulse 87 Resp 18 Ht 5' 9\" (1.753 m) Wt 83.5 kg (184 lb) LMP 03/18/2016 SpO2 98% BMI 27.17 kg/m² Assessment and Plan Diagnoses and all orders for this visit: 1. Visual changes 
-     REFERRAL TO OPHTHALMOLOGY 2. Lumbar radiculopathy, chronic 
-     EMG NCV MOTOR WITH F/WAVE PER NERVE; Future 28-year-old woman having new visual hallucinations infrequently persistently in the past year. The symptoms occur bilaterally transiently. Difficult to localize purely. We will have her see ophthalmology before doing any further neuro imaging. I do not think this is stroke. Continue aspirin as is. Could be stress-induced but need to follow. The left leg pain is getting worse I suspect from her history of radiculopathy. She has history of fusion. We will check an EMG at this point looking for any acute on chronic changes. Might need to have an MRI lumbar spine done after the EMG. Stroke education done at length. Anything unusual please come to the hospital immediately. We will follow-up with results once they are done. 2 [facility],  
NEUROLOGIST Diplomate AKILA

## 2019-02-20 ENCOUNTER — OFFICE VISIT (OUTPATIENT)
Dept: NEUROLOGY | Age: 44
End: 2019-02-20

## 2019-02-20 DIAGNOSIS — M54.17 LUMBOSACRAL RADICULOPATHY AT L5: Primary | ICD-10-CM

## 2019-02-20 NOTE — PROGRESS NOTES
[address] [facility] Neurology [facility]  [address] [place] [address]  Phone [phone] Fax [phone]  Test Date:  2019    Patient: Aleksandr Downey : 1975 Physician: Rey Yuan. Ashutosh Best DO   Sex: Female Height: 5' 9\" Ref Phys: Wesley Marking. Martin Luong, DO   ID#: [id number] Weight: 164 lbs. Technician: Veronica Maradiaga, EMG Tech     Patient Complaints:  Left lower extremity paresthesias/pain, radicular symptoms    NCV & EMG Findings:  All nerve conduction studies were within normal limits. All F Wave latencies were within normal limits. Needle evaluation of the left extensor digitorum brevis muscle showed increased motor unit amplitude, increased motor unit duration, slightly increased polyphasic potentials, rapid recruitment, and moderately decreased interference pattern. The left posterior tibialis muscle showed increased motor unit amplitude, increased motor unit duration, rapid recruitment, and moderately decreased interference pattern. All remaining muscles (as indicated in the following table) showed no evidence of electrical instability. Impression:  Extensive electrodiagnostic examination of the left lower extremity reveals the followin. Chronic motor axon loss changes involving distal L5-innervated myotomes of the left lower extremity, mild in degree electrically. Findings are suggestive of a chronic left L5 radiculopathy without evidence of active/ongoing denervation. 2. No evidence of a generalized sensorimotor polyneuropathy. ___________________________  Karen Best DO        Nerve Conduction Studies  Anti Sensory Summary Table     Stim Site NR Peak (ms) Norm Peak (ms) P-T Amp (µV) Norm P-T Amp Onset (ms) Site1 Site2 Delta-P (ms) Dist (cm) Hayes (m/s) Norm Hayes (m/s)   Left Sup Peroneal Anti Sensory (Ant Lat Mall)  32°C   14 cm    2.3 <4.4 24.6 >5.0 1.8 14 cm Ant Lat Mall 2.3 14.0 61 >32   Site 2 2. 5  13.5  1.5         Left Sural Anti Sensory (Lat Mall)  31.6°C   Calf    1.6 <4.0 26.2 >5.0 0.9 Calf Lat Mall 1.6 14.0 88 >35     Motor Summary Table     Stim Site NR Onset (ms) Norm Onset (ms) O-P Amp (mV) Norm O-P Amp Site1 Site2 Delta-0 (ms) Dist (cm) Hayes (m/s) Norm Hayes (m/s)   Left Peroneal Motor (Ext Dig Brev)  30.5°C   Ankle    3.3 <6.1 2.6 >2.5 B Fib Ankle 7.5 37.0 49 >38   B Fib    10.8  2.4  Poplt B Fib 1.7 10.0 59 >40   Poplt    12.5  2.4          Left Tibial Motor (Abd Obregon Brev)  31.2°C   Ankle    5.4 <6.1 9.2 >3.0 Knee Ankle 10.4 36.0 35 >35   Knee    15.8  4.5            F Wave Studies     NR F-Lat (ms) Lat Norm (ms) L-R F-Lat (ms) L-R Lat Norm   Left Tibial (Mrkrs) (Abd Hallucis)  31.4°C      55.42 <61  <5.7     EMG     Side Muscle Nerve Root Ins Act Fibs Psw Amp Dur Poly Recrt Int Pat Comment   Left Ext Dig Brev Dp Br Peronel L5, S1 Nml Nml Nml Incr >12ms 1+ Rapid 75%    Left AbdHallucis MedPlantar S1-2 Nml Nml Nml Nml Nml 0 Nml Nml    Left PostTibialis Tibial L5, S1 Nml Nml Nml Incr >12ms 0 Rapid 75%    Left Gastroc Tibial S1-2 Nml Nml Nml Nml Nml 0 Nml Nml    Left AntTibialis Dp Br Peronel L4-5 Nml Nml Nml Nml Nml 0 Nml Nml    Left RectFemoris Femoral L2-4 Nml Nml Nml Nml Nml 0 Nml Nml    Left GluteusMed SupGluteal L5-S1 Nml Nml Nml Nml Nml 0 Nml Nml          Waveforms:

## 2019-03-09 ENCOUNTER — HOSPITAL ENCOUNTER (OUTPATIENT)
Dept: MRI IMAGING | Age: 44
Discharge: HOME | End: 2019-03-09
Attending: PSYCHIATRY & NEUROLOGY
Payer: COMMERCIAL

## 2019-03-09 DIAGNOSIS — M51.36 DDD (DEGENERATIVE DISC DISEASE), LUMBAR: ICD-10-CM

## 2019-03-09 PROCEDURE — 72148 MRI LUMBAR SPINE W/O DYE: CPT

## 2019-04-05 ENCOUNTER — TELEPHONE (OUTPATIENT)
Dept: NEUROLOGY | Age: 44
End: 2019-04-05

## 2019-04-05 ENCOUNTER — OFFICE VISIT (OUTPATIENT)
Dept: INTERNAL MEDICINE CLINIC | Facility: CLINIC | Age: 44
End: 2019-04-05

## 2019-04-05 VITALS
WEIGHT: 188 LBS | SYSTOLIC BLOOD PRESSURE: 131 MMHG | OXYGEN SATURATION: 98 % | DIASTOLIC BLOOD PRESSURE: 83 MMHG | HEIGHT: 69 IN | TEMPERATURE: 98.6 F | RESPIRATION RATE: 16 BRPM | HEART RATE: 81 BPM | BODY MASS INDEX: 27.85 KG/M2

## 2019-04-05 DIAGNOSIS — G89.29 CHRONIC LEFT-SIDED LOW BACK PAIN WITH BILATERAL SCIATICA: ICD-10-CM

## 2019-04-05 DIAGNOSIS — M25.50 ARTHRALGIA, UNSPECIFIED JOINT: ICD-10-CM

## 2019-04-05 DIAGNOSIS — M54.42 CHRONIC LEFT-SIDED LOW BACK PAIN WITH BILATERAL SCIATICA: ICD-10-CM

## 2019-04-05 DIAGNOSIS — E78.5 HYPERLIPIDEMIA, UNSPECIFIED HYPERLIPIDEMIA TYPE: ICD-10-CM

## 2019-04-05 DIAGNOSIS — F41.9 ANXIETY: ICD-10-CM

## 2019-04-05 DIAGNOSIS — R32 URINARY INCONTINENCE, UNSPECIFIED TYPE: Primary | ICD-10-CM

## 2019-04-05 DIAGNOSIS — M54.41 CHRONIC LEFT-SIDED LOW BACK PAIN WITH BILATERAL SCIATICA: ICD-10-CM

## 2019-04-05 DIAGNOSIS — M19.049 HAND ARTHRITIS: ICD-10-CM

## 2019-04-05 DIAGNOSIS — R74.01 ELEVATED ALT MEASUREMENT: ICD-10-CM

## 2019-04-05 DIAGNOSIS — E55.9 VITAMIN D DEFICIENCY: ICD-10-CM

## 2019-04-05 DIAGNOSIS — Z90.710 H/O: HYSTERECTOMY: ICD-10-CM

## 2019-04-05 RX ORDER — DICLOFENAC SODIUM 10 MG/G
GEL TOPICAL 4 TIMES DAILY
Qty: 100 G | Refills: 2 | Status: SHIPPED | OUTPATIENT
Start: 2019-04-05 | End: 2019-04-10 | Stop reason: SDUPTHER

## 2019-04-05 RX ORDER — LORAZEPAM 1 MG/1
1 TABLET ORAL 2 TIMES DAILY
Qty: 30 TAB | Refills: 0 | Status: SHIPPED | OUTPATIENT
Start: 2019-04-05 | End: 2019-07-26 | Stop reason: SDUPTHER

## 2019-04-05 NOTE — TELEPHONE ENCOUNTER
Pt calling for her test results. Her sx are getting worse. Please advise. Pt aware doctor is out and we will call back early next week.

## 2019-04-05 NOTE — PROGRESS NOTES
Subjective:      Rodrigo Bellamy is a 44 y.o. female who is a new patient and is here to to follow up on her chronic issues. Back pain: she notes she had an EMG and MRI to her back. She had an MRI done in 2/22/19 but has not gotten her results. She doesn't have an appointment for follow up until May 29. She notes back pain is getting progressively worse and that she also worsening urinary incontinence. She has had 3 previous back surgeries, done steroid injections, PT, and no relief. Reviewed MRI which shows:  MRI Results (most recent):  Results from [place] encounter on 03/09/19   MRI LUMB SPINE WO CONT    Narrative EXAM:  MRI LUMB SPINE WO CONT  INDICATION:  History of L4-L5 fusion 2009 in 2011. Worsening radicular pain. Please do MRI lumbar spine. Left leg pain, left hip pain, left greater than  right back pain.,  TECHNIQUE: Sagittal T1, T2, STIR and axial T1 and T2 weighted images of the  lumbar spine were obtained. COMPARISON: None available. FINDINGS:  The distal spinal cord has normal contour and signal.  The lumbar vertebra are in good alignment. The lower two thoracic disks are unremarkable. L1-L2:  No disc bulge or stenosis. L2-L3:  No significant disk bulge or stenosis. L3-4:  Mild loss of disc space height and signal with diffuse disc bulge and  endplate hypertrophy right posterior lateral disc protrusion/extrusion. Mild  facet and ligament hypertrophy. Mild/moderate spinal canal and foramina  stenosis. Slight retrolisthesis of L3 on L4. L4-5 and L5-S1: Chronic postoperative findings from posterior laminectomy and  fusion with pedicle screws. Spinal canal is well decompressed. Some metallic  artifact from pedicle screws. No unusual postoperative findings. Impression IMPRESSION:  1. No unusual postoperative findings L4-S1.  2. Disc degeneration/protrusion and facet hypertrophy L3-4 with mild/moderate  spinal canal and foramina stenosis.            Cardiovascular Review  The patient has orthostatic hypotension, hyperlipidemia. Since last visit: no changes. She reports no TIA's, no chest pain on exertion, no dyspnea on exertion, no swelling of ankles. Diet and Lifestyle: generally follows a low fat low cholesterol diet, generally follows a low sodium diet, no formal exercise but active during the day. Labs: labs pending. BP Readings from Last 3 Encounters:   04/05/19 131/83   02/12/19 122/80   07/17/18 123/84     Arthritis: her notes hand arthritis, she notes she cannot open jars. This is getting progressively worse. Her mother has osteoarthritis. Patient Active Problem List    Diagnosis Date Noted    Abnormal mammogram 12/19/2017    PTSD (post-traumatic stress disorder) 11/29/2017    Irritable bowel syndrome with diarrhea 11/29/2017    Adult BMI 27.0-27.9 kg/sq m 10/18/2017    Elevated BP without diagnosis of hypertension 09/18/2017    Hyperbilirubinemia 09/05/2017    Elevated ALT measurement 09/05/2017    Family history of exposure to Agent Orange 12/21/2016    Cellulitis of left upper extremity 08/19/2016    Arthralgia 08/19/2016    Hyperlipidemia 08/19/2016    Vitamin D deficiency 08/19/2016    Anxiety 08/19/2016    DDD (degenerative disc disease), lumbar 08/19/2016    Colitis 08/19/2016    Mild intermittent asthma without complication 94/82/0348    Fibroids 03/23/2016     Current Outpatient Medications   Medication Sig Dispense Refill    diclofenac (VOLTAREN) 1 % gel Apply  to affected area four (4) times daily. 100 g 2    LORazepam (ATIVAN) 1 mg tablet Take 1 Tab by mouth two (2) times a day. Max Daily Amount: 2 mg. Prn anxiety  Indications: anxious 30 Tab 0    sertraline (ZOLOFT) 100 mg tablet TAKE TWO TABLETS BY MOUTH EVERY DAY 90 Tab 3    atorvastatin (LIPITOR) 20 mg tablet Take 1 Tab by mouth daily. 120 Tab 3    aspirin delayed-release 81 mg tablet Take  by mouth daily.  CETIRIZINE HCL (ZYRTEC PO) Take  by mouth as needed.       cholecalciferol (VITAMIN D3) 1,000 unit tablet Take 1 Tab by mouth daily. 60 Tab 11     Allergies   Allergen Reactions    Latex Rash    Sulfa (Sulfonamide Antibiotics) Anaphylaxis    Adhesive Tape-Silicones Rash    Morphine Rash    Neosporin [Benzalkonium Chloride] Rash     Past Medical History:   Diagnosis Date    Adverse effect of anesthesia     hard to wake up    Anxiety     Arthritis     back    Asperger syndrome     Asthma     Depression     Hypercholesterolemia     IBD (inflammatory bowel disease)     Ill-defined condition     discomfort hips shoulders knees and feet and hands MAURICIO was positive    Irritable bowel disease 1995    Neurological disorder     Psychiatric disorder     anxiety and depression     Past Surgical History:   Procedure Laterality Date    HX HYSTERECTOMY  4/14/16    da Dotty Robotic Total Laparoscopic Hysterectomy with bilateral  salpingectomy more than 250 grams. Cystoscopy.  HX ORTHOPAEDIC      Spinal fusion, back surgery,rods and screws    HX ORTHOPAEDIC      achilles lenghtening    HX ORTHOPAEDIC      laminectomy    HX OTHER SURGICAL      tumor removed from nose as child    HX OTHER SURGICAL      dental extraction        Review of Systems    A comprehensive review of systems was negative except for that written in the HPI. Objective:     Visit Vitals  /83 (BP 1 Location: Left arm, BP Patient Position: Sitting)   Pulse 81   Temp 98.6 °F (37 °C) (Oral)   Resp 16   Ht 5' 9\" (1.753 m)   Wt 188 lb (85.3 kg)   LMP 03/18/2016   SpO2 98%   BMI 27.76 kg/m²     General appearance: alert, cooperative, no distress, appears stated age  Head: Normocephalic, without obvious abnormality, atraumatic  Eyes: negative  Neck: supple, symmetrical, trachea midline, no adenopathy, thyroid: not enlarged, symmetric, no tenderness/mass/nodules, no carotid bruit and no JVD  Back: symmetric, no curvature. ROM normal. No CVA tenderness.  S1 tenderness to palpation, bilateral straight leg raise positive. Lungs: clear to auscultation bilaterally  Heart: regular rate and rhythm, S1, S2 normal, no murmur, click, rub or gallop  Extremities: extremities normal, atraumatic, no cyanosis or edema  Pulses: 2+ and symmetric  Skin: Skin color, texture, turgor normal. No rashes or lesions  Neurologic: Alert and oriented X 3, normal strength and tone. Normal coordination and gait  Psych: appropriate mood, speech, affect  Nursing note and vitals reviewed  Assessment/Plan:       ICD-10-CM ICD-9-CM    1. Urinary incontinence, unspecified type R32 788.30    2. Vitamin D deficiency E55.9 268.9 VITAMIN D, 25 HYDROXY   3. Chronic left-sided low back pain with bilateral sciatica M54.41 724.2 REFERRAL TO ORTHOPEDICS    M54.42 724.3     G89.29 338.29    4. Hand arthritis M19.049 716.94 diclofenac (VOLTAREN) 1 % gel   5. Elevated ALT measurement E02.4 476.6 METABOLIC PANEL, COMPREHENSIVE   6. Arthralgia, unspecified joint M25.50 719.40    7. Hyperlipidemia, unspecified hyperlipidemia type E78.5 272.4 LIPID PANEL   8. H/O: hysterectomy Z90.710 V88.01 REFERRAL TO GYNECOLOGY   9. Anxiety F41.9 300.00 LORazepam (ATIVAN) 1 mg tablet   she will get fasting labs. She will follow up with neurology, cardiology, ortho, and gyn. She will [facility] me [facility] date [facility]    Follow-up and Dispositions    · Return for 3-6 months depending on if things are going well. I will be in touch with labs. Advised her to call back or return to office if symptoms worsen/change/persist.  Discussed expected course/resolution/complications of diagnosis in detail with patient. Medication risks/benefits/costs/interactions/alternatives discussed with patient. She was given an after visit summary which includes diagnoses, current medications, & vitals. She expressed understanding with the diagnosis and plan.

## 2019-04-05 NOTE — TELEPHONE ENCOUNTER
Patient wishing for Rahcel Fraire LPN to call because her symptoms are worsening; pain, numbness, urinary incontinence. Please advise.     Best # [phone]

## 2019-04-05 NOTE — PROGRESS NOTES
Identified pt with two pt identifiers(name and ). Reviewed record in preparation for visit and have obtained necessary documentation. Chief Complaint   Patient presents with    Results     wants to discuss MRI results        Health Maintenance Due   Topic    PAP AKA CERVICAL CYTOLOGY        Coordination of Care Questionnaire:  :   1) Have you been to an emergency room, urgent care, or hospitalized since your last visit? If yes, where when, and reason for visit? no       2. Have seen or consulted any other health care provider since your last visit? If yes, where when, and reason for visit? NO      3) Do you have an Advanced Directive/ Living Will in place? NO  If yes, do we have a copy on file NO  If no, would you like information NO    Patient is accompanied by wife I have received verbal consent from Hannah Coppola to discuss any/all medical information while they are present in the room.   Visit Vitals  /83 (BP 1 Location: Left arm, BP Patient Position: Sitting)   Pulse 81   Temp 98.6 °F (37 °C) (Oral)   Resp 16   Ht 5' 9\" (1.753 m)   Wt 188 lb (85.3 kg)   SpO2 98%   BMI 27.76 kg/m²     Sorin Vincent LPN

## 2019-04-05 NOTE — PATIENT INSTRUCTIONS
Home work:  1. Call Dr. Nicky Manriquez about results, let them know symptoms are worsening and include urinary incontinence (also worsening). 2. Call Dr. Danette Saint office and find out when you are due for follow up. 3. Schedule an appointment with Dr. Scot Davey (ortho)  4. Schedule an appointment with Dr. Yasmin Isidro (gyn)  5. Get fasting labs done (8 hours no eating but drink plenty of fluids)    Hand Arthritis: Exercises  Your Care Instructions  Here are some examples of exercises for hand arthritis. Start each exercise slowly. Ease off the exercise if you start to have pain. Your doctor or your physical or occupational therapist will tell you when you can start these exercises and which ones will work best for you. How to do the exercises  Tendon glijeremiah    1. In this exercise, the steps follow one another to a make a continuous movement. 2. With your affected hand, point your fingers and thumb straight up. Your wrist should be relaxed, following the line of your fingers and thumb. 3. Curl your fingers so that the top two joints in them are bent, and your fingers wrap down. Your fingertips should touch or be near the base of your fingers. Your fingers will look like a hook. 4. Make a fist by bending your knuckles. Your thumb can gently rest against your index (pointing) finger. 5. Unwind your fingers slightly so that your fingertips can touch the base of your palm. Your thumb can rest against your index finger. 6. Move back to your starting position, with your fingers and thumb pointing up. 7. Repeat the series of motions 8 to 12 times. 8. Switch hands and repeat steps 1 through 6, even if only one hand is sore. Intrinsic flexion    1. Rest your affected hand on a table and bend the large joints where your fingers connect to your hand. Keep your thumb and the other joints in your fingers straight. 2. Slowly straighten your fingers.  Your wrist should be relaxed, following the line of your fingers and thumb.  3. Move back to your starting position, with your hand bent. 4. Repeat 8 to 12 times. 5. Switch hands and repeat steps 1 through 4, even if only one hand is sore. Finger extension    1. Place your affected hand flat on a table. 2. Lift and then lower one finger at a time off the table. 3. Repeat 8 to 12 times. 4. Switch hands and repeat steps 1 through 3, even if only one hand is sore. MP extension    1. Place your good hand on a table, palm up. Put your affected hand on top of your good hand with your fingers wrapped around the thumb of your good hand like you are making a fist.  2. Slowly uncurl the joints of your affected hand where your fingers connect to your hand so that only the top two joints of your fingers are bent. Your fingers will look like a hook. 3. Move back to your starting position, with your fingers wrapped around your good thumb. 4. Repeat 8 to 12 times. 5. Switch hands and repeat steps 1 through 4, even if only one hand is sore. PIP extension (with MP extension)    1. Place your good hand on a table, palm up. Put your affected hand on top of your good hand, palm up. 2. Use the thumb and fingers of your good hand to grasp below the middle joint of one finger of your affected hand. 3. Straighten the last two joints of that finger. 4. Repeat 8 to 12 times. 5. Repeat steps 1 through 4 with each finger. 6. Switch hands and repeat steps 1 through 5, even if only one hand is sore. DIP flexion    1. With your good hand, grasp one finger of your affected hand. Your thumb will be on the top side of your finger just below the joint that is closest to your fingernail. 2. Slowly bend your affected finger only at the joint closest to your fingernail. 3. Repeat 8 to 12 times. 4. Repeat steps 1 through 3 with each finger. 5. Switch hands and repeat steps 1 through 4, even if only one hand is sore. Follow-up care is a key part of your treatment and safety.  Be sure to make and go to all appointments, and call your doctor if you are having problems. It's also a good idea to know your test results and keep a list of the medicines you take. Where can you learn more? Go to http://demarco-max.info/. Enter E621 in the search box to learn more about \"Hand Arthritis: Exercises. \"  Current as of: September 20, 2018  Content Version: 11.9  © 8108-4974 [employer], [employer]. Care instructions adapted under license by [employer] (which disclaims liability or warranty for this information). If you have questions about a medical condition or this instruction, always ask your healthcare professional. [address] any warranty or liability for your use of this information.

## 2019-04-08 ENCOUNTER — APPOINTMENT (OUTPATIENT)
Dept: MRI IMAGING | Age: 44
End: 2019-04-08
Attending: EMERGENCY MEDICINE
Payer: COMMERCIAL

## 2019-04-08 ENCOUNTER — HOSPITAL ENCOUNTER (EMERGENCY)
Age: 44
Discharge: HOME | End: 2019-04-08
Attending: EMERGENCY MEDICINE
Payer: COMMERCIAL

## 2019-04-08 VITALS
HEIGHT: 69 IN | SYSTOLIC BLOOD PRESSURE: 122 MMHG | OXYGEN SATURATION: 98 % | WEIGHT: 188 LBS | RESPIRATION RATE: 16 BRPM | BODY MASS INDEX: 27.85 KG/M2 | TEMPERATURE: 98.1 F | HEART RATE: 75 BPM | DIASTOLIC BLOOD PRESSURE: 87 MMHG

## 2019-04-08 DIAGNOSIS — M51.26 PROTRUSION OF LUMBAR INTERVERTEBRAL DISC: Primary | ICD-10-CM

## 2019-04-08 DIAGNOSIS — N39.41 URGE INCONTINENCE: ICD-10-CM

## 2019-04-08 LAB
ANION GAP SERPL CALC-SCNC: 5 MMOL/L (ref 5–15)
APPEARANCE UR: CLEAR
BACTERIA URNS QL MICRO: NEGATIVE /HPF
BASOPHILS # BLD: 0.1 K/UL (ref 0–0.1)
BASOPHILS NFR BLD: 1 % (ref 0–1)
BILIRUB UR QL: NEGATIVE
BUN SERPL-MCNC: 11 MG/DL (ref 6–20)
BUN/CREAT SERPL: 16 (ref 12–20)
CALCIUM SERPL-MCNC: 9.6 MG/DL (ref 8.5–10.1)
CHLORIDE SERPL-SCNC: 108 MMOL/L (ref 97–108)
CO2 SERPL-SCNC: 25 MMOL/L (ref 21–32)
COLOR UR: NORMAL
COMMENT, HOLDF: NORMAL
CREAT SERPL-MCNC: 0.7 MG/DL (ref 0.55–1.02)
DIFFERENTIAL METHOD BLD: NORMAL
EOSINOPHIL # BLD: 0.3 K/UL (ref 0–0.4)
EOSINOPHIL NFR BLD: 4 % (ref 0–7)
EPITH CASTS URNS QL MICRO: NORMAL /LPF
ERYTHROCYTE [DISTWIDTH] IN BLOOD BY AUTOMATED COUNT: 12.9 % (ref 11.5–14.5)
GLUCOSE SERPL-MCNC: 87 MG/DL (ref 65–100)
GLUCOSE UR STRIP.AUTO-MCNC: NEGATIVE MG/DL
HCT VFR BLD AUTO: 41.8 % (ref 35–47)
HGB BLD-MCNC: 13.7 G/DL (ref 11.5–16)
HGB UR QL STRIP: NEGATIVE
HYALINE CASTS URNS QL MICRO: NORMAL /LPF (ref 0–5)
IMM GRANULOCYTES # BLD AUTO: 0 K/UL (ref 0–0.04)
IMM GRANULOCYTES NFR BLD AUTO: 0 % (ref 0–0.5)
KETONES UR QL STRIP.AUTO: NEGATIVE MG/DL
LEUKOCYTE ESTERASE UR QL STRIP.AUTO: NEGATIVE
LYMPHOCYTES # BLD: 2.4 K/UL (ref 0.8–3.5)
LYMPHOCYTES NFR BLD: 28 % (ref 12–49)
MCH RBC QN AUTO: 28.2 PG (ref 26–34)
MCHC RBC AUTO-ENTMCNC: 32.8 G/DL (ref 30–36.5)
MCV RBC AUTO: 86 FL (ref 80–99)
MONOCYTES # BLD: 0.5 K/UL (ref 0–1)
MONOCYTES NFR BLD: 6 % (ref 5–13)
NEUTS SEG # BLD: 5.2 K/UL (ref 1.8–8)
NEUTS SEG NFR BLD: 61 % (ref 32–75)
NITRITE UR QL STRIP.AUTO: NEGATIVE
NRBC # BLD: 0 K/UL (ref 0–0.01)
NRBC BLD-RTO: 0 PER 100 WBC
PH UR STRIP: 6.5 [PH] (ref 5–8)
PLATELET # BLD AUTO: 223 K/UL (ref 150–400)
PMV BLD AUTO: 9.7 FL (ref 8.9–12.9)
POTASSIUM SERPL-SCNC: 3.7 MMOL/L (ref 3.5–5.1)
PROT UR STRIP-MCNC: NEGATIVE MG/DL
RBC # BLD AUTO: 4.86 M/UL (ref 3.8–5.2)
RBC #/AREA URNS HPF: NORMAL /HPF (ref 0–5)
SAMPLES BEING HELD,HOLD: NORMAL
SODIUM SERPL-SCNC: 138 MMOL/L (ref 136–145)
SP GR UR REFRACTOMETRY: 1.01 (ref 1–1.03)
UR CULT HOLD, URHOLD: NORMAL
UROBILINOGEN UR QL STRIP.AUTO: 0.2 EU/DL (ref 0.2–1)
WBC # BLD AUTO: 8.5 K/UL (ref 3.6–11)
WBC URNS QL MICRO: NORMAL /HPF (ref 0–4)

## 2019-04-08 PROCEDURE — 96374 THER/PROPH/DIAG INJ IV PUSH: CPT

## 2019-04-08 PROCEDURE — 96375 TX/PRO/DX INJ NEW DRUG ADDON: CPT

## 2019-04-08 PROCEDURE — 74011250636 HC RX REV CODE- 250/636: Performed by: EMERGENCY MEDICINE

## 2019-04-08 PROCEDURE — 81001 URINALYSIS AUTO W/SCOPE: CPT

## 2019-04-08 PROCEDURE — 72148 MRI LUMBAR SPINE W/O DYE: CPT

## 2019-04-08 PROCEDURE — 36415 COLL VENOUS BLD VENIPUNCTURE: CPT

## 2019-04-08 PROCEDURE — 80048 BASIC METABOLIC PNL TOTAL CA: CPT

## 2019-04-08 PROCEDURE — 85025 COMPLETE CBC W/AUTO DIFF WBC: CPT

## 2019-04-08 PROCEDURE — 99284 EMERGENCY DEPT VISIT MOD MDM: CPT

## 2019-04-08 RX ORDER — METHYLPREDNISOLONE 4 MG/1
TABLET ORAL
Qty: 1 DOSE PACK | Refills: 0 | Status: SHIPPED | OUTPATIENT
Start: 2019-04-08 | End: 2019-05-29 | Stop reason: ALTCHOICE

## 2019-04-08 RX ORDER — IBUPROFEN 800 MG/1
800 TABLET ORAL
Qty: 20 TAB | Refills: 0 | Status: SHIPPED | OUTPATIENT
Start: 2019-04-08 | End: 2019-04-15

## 2019-04-08 RX ORDER — ACETAMINOPHEN 500 MG
1000 TABLET ORAL
Qty: 20 TAB | Refills: 0 | Status: SHIPPED | OUTPATIENT
Start: 2019-04-08 | End: 2019-07-26

## 2019-04-08 RX ORDER — ONDANSETRON 2 MG/ML
4 INJECTION INTRAMUSCULAR; INTRAVENOUS
Status: DISCONTINUED | OUTPATIENT
Start: 2019-04-08 | End: 2019-04-08 | Stop reason: HOSPADM

## 2019-04-08 RX ORDER — FENTANYL CITRATE 50 UG/ML
50 INJECTION, SOLUTION INTRAMUSCULAR; INTRAVENOUS
Status: DISCONTINUED | OUTPATIENT
Start: 2019-04-08 | End: 2019-04-08 | Stop reason: HOSPADM

## 2019-04-08 RX ADMIN — ONDANSETRON 4 MG: 2 INJECTION INTRAMUSCULAR; INTRAVENOUS at 13:13

## 2019-04-08 RX ADMIN — FENTANYL CITRATE 50 MCG: 50 INJECTION, SOLUTION INTRAMUSCULAR; INTRAVENOUS at 13:13

## 2019-04-08 NOTE — DISCHARGE INSTRUCTIONS
Patient Education        Herniated Disc: Exercises  Your Care Instructions  Here are some examples of typical rehabilitation exercises for your condition. Start each exercise slowly. Ease off the exercise if you start to have pain. Your doctor or physical therapist will tell you when you can start these exercises and which ones will work best for you. How to stay safe  These exercises can help you move easier and feel better. But when you first start doing them, you may have more pain in your back. This is normal. But it is important to pay close attention to your pain during and after each exercise. · Keep doing these exercises if your pain stays the same or moves from your leg and buttock more toward the middle of your spine. Pain moving out of your leg and buttock is a good sign. · Stop doing these exercises if your pain gets worse in your leg and buttock. Stop if you start to have pain in your leg and buttock that you didn't have before. Be sure to do these exercises in the order they appear. Note how your pain changes before you move to the next one. If your pain is much worse right after exercise and stays worse the next day, do not do any of these exercises. How to do the exercises  1. Rest on belly    1. Lie on your stomach, face down, with your head turned to the side. Place your arms beside your body. If this bothers your neck, place your hands, one on top of the other, underneath your forehead. This will help support your head and neck. 2. Try to relax your lower back muscles as much as you can. 3. Continue to lie on your stomach for 2 minutes. 4. If your pain spreads down your leg or increases down your leg, stop this exercise and do not do the next exercises. 2. Press-up    1. Lie on your stomach, face down. Keep your elbows tucked into your sides and under your shoulders. 2. Press your elbows down into the floor to raise your upper back. As you do this, relax your stomach muscles.  Allow your back to arch without using your back muscles. Let your low back relax completely as you arch up. 3. Hold this position for 2 minutes. 4. Repeat 2 to 4 times. 5. If your pain spreads down your leg or increases down your leg, stop this exercise and do not do the next exercises. 3. Full press-up    1. Lie on your stomach, face down. Keep your elbows tucked into your sides and under your shoulders. 2. Straighten your elbows, and push your upper body up as far as you can. Allow your lower back to sag. Keep your hips, pelvis, and legs relaxed. 3. Hold this position for 5 seconds, and then relax. 4. Repeat 10 times. Each time, try to raise your upper body a little higher and hold your arms a bit straighter. 5. If your pain spreads down your leg or gets worse down your leg, stop this exercise and do not move to the next exercise. 6. If you can't do this exercise, you may instead try the backward bend exercise that follows. 4. Backward bend    1. Stand with your feet hip-width apart. Your toes should point forward. Do not lock your knees. 2. Place your hands in the small of your back. 3. Bend backward as far as you can, keeping your knees straight. Hold this position for 2 to 3 seconds. Then return to your starting position. 4. Repeat 2 to 4 times. Each time, try to bend backward a little farther, until you bend backward as far as you can. 5. If your pain spreads down your leg or increases down your leg, stop this exercise. Follow-up care is a key part of your treatment and safety. Be sure to make and go to all appointments, and call your doctor if you are having problems. It's also a good idea to know your test results and keep a list of the medicines you take. Where can you learn more? Go to http://demarco-max.info/. Enter [phone] in the search box to learn more about \"Herniated Disc: Exercises. \"  Current as of: September 20, 2018  Content Version: 11.9  © 0509-6263 [employer]. Care instructions adapted under license by [employer] (which disclaims liability or warranty for this information). If you have questions about a medical condition or this instruction, always ask your healthcare professional. [address] any warranty or liability for your use of this information.

## 2019-04-08 NOTE — ED TRIAGE NOTES
Pt had MRI ~2 weeks ago for back pain and incontinence. Referred to ED by Dr. Nimisha Smith today for eval of acute spinal cord issue. Pt reports constant back pain but it is getting sharper with increase in urge incontinence.

## 2019-04-08 NOTE — TELEPHONE ENCOUNTER
If she is having worsening symptoms to include unexpected incontinence she needs to come to the emergency room. That could be an acute spinal cord issue.

## 2019-04-08 NOTE — ED PROVIDER NOTES
The history is provided by the patient. Back Pain This is a chronic problem. The current episode started more than 1 week ago. The problem has been gradually worsening. The problem occurs constantly. Patient reports not work related injury. The pain is associated with no known injury. The pain is present in the lumbar spine, lower back, left side and right side. The quality of the pain is described as stabbing, shooting and aching. The pain is moderate. The symptoms are aggravated by bending, twisting and certain positions. The pain is the same all the time. Associated symptoms include bladder incontinence (occurs when she has to go to the bathroom she attempts to hold it and is unable to maintain urine in her bladder. if she is able to get to bathroom in time she has no incontinence). Pertinent negatives include no fever. Risk factors: known disc protrusion. Past Medical History:  
Diagnosis Date  Adverse effect of anesthesia   
 hard to wake up  Anxiety  Arthritis   
 back  Asperger syndrome  Asthma  Depression  Hypercholesterolemia  IBD (inflammatory bowel disease)  Ill-defined condition   
 discomfort hips shoulders knees and feet and hands MAURICIO was positive  Irritable bowel disease 1995  Neurological disorder  Psychiatric disorder   
 anxiety and depression Past Surgical History:  
Procedure Laterality Date  HX HYSTERECTOMY  4/14/16  
 da Dotty Robotic Total Laparoscopic Hysterectomy with bilateral  salpingectomy more than 250 grams. Cystoscopy.  HX ORTHOPAEDIC Spinal fusion, back surgery,rods and screws  HX ORTHOPAEDIC    
 achilles lenghtening  HX ORTHOPAEDIC    
 laminectomy  HX OTHER SURGICAL    
 tumor removed from nose as child  HX OTHER SURGICAL    
 dental extraction Family History:  
Problem Relation Age of Onset  Cancer Mother  Heart Disease Father  Depression Father  Cancer Sister  Alzheimer Maternal Grandmother  Diabetes Maternal Grandfather  Heart Surgery Paternal Grandmother  Heart Surgery Paternal Grandfather Social History Socioeconomic History  Marital status:  Spouse name: Not on file  Number of children: Not on file  Years of education: Not on file  Highest education level: Not on file Occupational History  Occupation:  9th and 12th grade Social Needs  Financial resource strain: Not on file  Food insecurity:  
  Worry: Not on file Inability: Not on file  Transportation needs:  
  Medical: Not on file Non-medical: Not on file Tobacco Use  Smoking status: Former Smoker Packs/day: 1.00 Years: 10.00 Pack years: 10.00 Last attempt to quit: 4/7/2004 Years since quitting: 15.0  Smokeless tobacco: Never Used Substance and Sexual Activity  Alcohol use: No  
 Drug use: Yes Types: Marijuana Comment: 2x/week  Sexual activity: Yes  
  Partners: Female Birth control/protection: None Lifestyle  Physical activity:  
  Days per week: Not on file Minutes per session: Not on file  Stress: Not on file Relationships  Social connections:  
  Talks on phone: Not on file Gets together: Not on file Attends Ep[employer]opalian service: Not on file Active member of club or organization: Not on file Attends meetings of clubs or organizations: Not on file Relationship status: Not on file  Intimate partner violence:  
  Fear of current or ex partner: Not on file Emotionally abused: Not on file Physically abused: Not on file Forced sexual activity: Not on file Other Topics Concern  Not on file Social History Narrative Lives with wife.  in 2013. Together since 2004.   
 1 cat. ALLERGIES: Latex; Sulfa (sulfonamide antibiotics); Adhesive tape-silicones; Morphine; and Neosporin [benzalkonium chloride] Review of Systems Constitutional: Negative for fever. Genitourinary: Positive for bladder incontinence (occurs when she has to go to the bathroom she attempts to hold it and is unable to maintain urine in her bladder. if she is able to get to bathroom in time she has no incontinence). Musculoskeletal: Positive for back pain. All other systems reviewed and are negative. Vitals:  
 04/08/19 1157 BP: 136/52 Pulse: 89 Resp: 16 Temp: 98.1 °F (36.7 °C) SpO2: 98% Physical Exam  
Constitutional: She appears well-developed and well-nourished. No distress. HENT:  
Head: Normocephalic and atraumatic. Eyes: Conjunctivae are normal.  
Neck: Neck supple. Cardiovascular: Normal rate and regular rhythm. Pulmonary/Chest: Effort normal. No respiratory distress. Abdominal: She exhibits no distension. Musculoskeletal: Normal range of motion. She exhibits no deformity. Lumbar back: She exhibits tenderness (diffuse, mild). She exhibits no bony tenderness. Neurological: She is alert. She has normal strength. She displays no atrophy. No cranial nerve deficit or sensory deficit. Coordination and gait normal.  
Skin: Skin is warm and dry. Psychiatric: Her behavior is normal.  
Nursing note and vitals reviewed. MDM 
  
44 y.o. female presents with low back pain that is acute on chronic. Had neurology evaluation for incontinence that she describes as urge incontinence rather than neurogenic bladder. Recent MR showing disc protrusion and L3-L4 stenosis without cord signal change. Original fusion was in [place] with Dr Justin Pederson. Has pending [facility] appointment. Will repeat MR to look for interval findings, consult ortho as indicated. There has been interval worsening of disc protrusion but cord signal and contour remains normal and no signs of cauda equina or other emergent surgical indication.  She has an outpatient ortho spine evaluation that she should keep. Will attempt steroid trial with NSAIDs and tylenol for pain. Pt ambulatory on discharge in minimal discomfort. Procedures

## 2019-04-10 DIAGNOSIS — M19.049 HAND ARTHRITIS: ICD-10-CM

## 2019-04-10 RX ORDER — DICLOFENAC SODIUM 10 MG/G
2 GEL TOPICAL 4 TIMES DAILY
Qty: 100 G | Refills: 2 | Status: SHIPPED | OUTPATIENT
Start: 2019-04-10 | End: 2019-04-11 | Stop reason: SDUPTHER

## 2019-05-29 ENCOUNTER — OFFICE VISIT (OUTPATIENT)
Dept: NEUROLOGY | Age: 44
End: 2019-05-29

## 2019-05-29 VITALS
RESPIRATION RATE: 16 BRPM | HEART RATE: 92 BPM | OXYGEN SATURATION: 97 % | WEIGHT: 189 LBS | DIASTOLIC BLOOD PRESSURE: 100 MMHG | HEIGHT: 69 IN | SYSTOLIC BLOOD PRESSURE: 140 MMHG | BODY MASS INDEX: 27.99 KG/M2

## 2019-05-29 DIAGNOSIS — M54.16 SPINAL STENOSIS OF LUMBAR REGION WITH RADICULOPATHY: Primary | ICD-10-CM

## 2019-05-29 DIAGNOSIS — M48.061 SPINAL STENOSIS OF LUMBAR REGION WITH RADICULOPATHY: Primary | ICD-10-CM

## 2019-05-29 NOTE — PROGRESS NOTES
Pt here for follow up on her back. Has appt with Dr Cindy Marte in the next couple of weeks. Pain is daily, but today it is a 6. Wants to review test results.

## 2019-05-29 NOTE — PROGRESS NOTES
Chief Complaint   Patient presents with    Neurologic Problem     Low back pain       HPI    46-year-old woman here to follow-up. She has a history of L4-S1 laminectomy. When I saw her last she was having worsening radicular pain. EMG confirming L5 radiculopathy. MRI showing worsening spinal at L3-4. She saw Ortho spine and has had 2 epidurals without much success. She is seeing another surgeon next month. Her left leg feels painful and paresthetic down to her toes. Review of Systems   Musculoskeletal: Positive for back pain. Negative for falls. Neurological: Positive for tingling and sensory change. All other systems reviewed and are negative.       Past Medical History:   Diagnosis Date    Adverse effect of anesthesia     hard to wake up    Anxiety     Arthritis     back    Asperger syndrome     Asthma     Depression     Hypercholesterolemia     IBD (inflammatory bowel disease)     Ill-defined condition     discomfort hips shoulders knees and feet and hands MAURICIO was positive    Irritable bowel disease 1995    Neurological disorder     Psychiatric disorder     anxiety and depression     Family History   Problem Relation Age of Onset    Cancer Mother     Heart Disease Father     Depression Father     Cancer Sister     Alzheimer Maternal Grandmother     Diabetes Maternal Grandfather     Heart Surgery Paternal Grandmother     Heart Surgery Paternal Grandfather      Social History     Socioeconomic History    Marital status:      Spouse name: Not on file    Number of children: Not on file    Years of education: Not on file    Highest education level: Not on file   Occupational History    Occupation:  9th and 12th grade   Social Needs    Financial resource strain: Not on file    Food insecurity:     Worry: Not on file     Inability: Not on file    Transportation needs:     Medical: Not on file     Non-medical: Not on file   Tobacco Use    Smoking status: Former Smoker     Packs/day: 1.00     Years: 10.00     Pack years: 10.00     Last attempt to quit: 4/7/2004     Years since quitting: 15.1    Smokeless tobacco: Never Used   Substance and Sexual Activity    Alcohol use: No    Drug use: Yes     Types: Marijuana     Comment: 2x/week     Sexual activity: Yes     Partners: Female     Birth control/protection: None   Lifestyle    Physical activity:     Days per week: Not on file     Minutes per session: Not on file    Stress: Not on file   Relationships    Social connections:     Talks on phone: Not on file     Gets together: Not on file     Attends Latter day service: Not on file     Active member of club or organization: Not on file     Attends meetings of clubs or organizations: Not on file     Relationship status: Not on file    Intimate partner violence:     Fear of current or ex partner: Not on file     Emotionally abused: Not on file     Physically abused: Not on file     Forced sexual activity: Not on file   Other Topics Concern    Not on file   Social History Narrative    Lives with wife.  in 2013. Together since 2004.     1 cat. Allergies   Allergen Reactions    Latex Rash     cellulitis    Sulfa (Sulfonamide Antibiotics) Anaphylaxis    Adhesive Tape-Silicones Rash    Morphine Rash    Neosporin [Benzalkonium Chloride] Rash         Current Outpatient Medications   Medication Sig    diclofenac (VOLTAREN) 1 % gel Apply 2 g to affected area four (4) times daily.  LORazepam (ATIVAN) 1 mg tablet Take 1 Tab by mouth two (2) times a day. Max Daily Amount: 2 mg. Prn anxiety  Indications: anxious    sertraline (ZOLOFT) 100 mg tablet TAKE TWO TABLETS BY MOUTH EVERY DAY    atorvastatin (LIPITOR) 20 mg tablet Take 1 Tab by mouth daily.  aspirin delayed-release 81 mg tablet Take  by mouth daily.  CETIRIZINE HCL (ZYRTEC PO) Take  by mouth as needed.  cholecalciferol (VITAMIN D3) 1,000 unit tablet Take 1 Tab by mouth daily.  acetaminophen (TYLENOL) 500 mg tablet Take 2 Tabs by mouth every six (6) hours as needed for Pain. No current facility-administered medications for this visit. Neurologic Exam     Mental Status   WD/WN adult in NAD, normal grooming  VSS  A&O x 3    PERRL, nonicteric  Face is symmetric, tongue midline  Speech is fluent and clear  No limb ataxia. No abnl movements. Moving all extemities spontaneously and symmetric  Normal gait    CVS RRR  Lungs nonlabored  Skin is warm and dry         Visit Vitals  BP (!) 140/100   Pulse 92   Resp 16   Ht 5' 9\" (1.753 m)   Wt 85.7 kg (189 lb)   LMP 03/18/2016   SpO2 97%   BMI 27.91 kg/m²       Assessment and Plan   Diagnoses and all orders for this visit:    1. Spinal stenosis of lumbar region with radiculopathy      50-year-old woman who has multilevel disc disease who also has worsening spinal stenosis at L3-4. She is already failed epidural injections twice now. Agree with seeing a spine specialist next month for further care as indicated. I discussed the EMG and MRI results with her. We talked about the anatomy of her condition in detail. No need to follow-up here. Continue seeing primary care and Ortho. I reviewed and decided to continue the current medications.       [address] [facility], [address]  Diplomate ABPN

## 2019-05-29 NOTE — PATIENT INSTRUCTIONS
A Healthy Lifestyle: Care Instructions Your Care Instructions A healthy lifestyle can help you feel good, stay at a healthy weight, and have plenty of energy for both work and play. A healthy lifestyle is something you can share with your whole family. A healthy lifestyle also can lower your risk for serious health problems, such as high blood pressure, heart disease, and diabetes. You can follow a few steps listed below to improve your health and the health of your family. Follow-up care is a key part of your treatment and safety. Be sure to make and go to all appointments, and call your doctor if you are having problems. It's also a good idea to know your test results and keep a list of the medicines you take. How can you care for yourself at home? · Do not eat too much sugar, fat, or fast foods. You can still have dessert and treats now and then. The goal is moderation. · Start small to improve your eating habits. Pay attention to portion sizes, drink less juice and soda pop, and eat more fruits and vegetables. ? Eat a healthy amount of food. A 3-ounce serving of meat, for example, is about the size of a deck of cards. Fill the rest of your plate with vegetables and whole grains. ? Limit the amount of soda and sports drinks you have every day. Drink more water when you are thirsty. ? Eat at least 5 servings of fruits and vegetables every day. It may seem like a lot, but it is not hard to reach this goal. A serving or helping is 1 piece of fruit, 1 cup of vegetables, or 2 cups of leafy, raw vegetables. Have an apple or some carrot sticks as an afternoon snack instead of a candy bar. Try to have fruits and/or vegetables at every meal. 
· Make exercise part of your daily routine. You may want to start with simple activities, such as walking, bicycling, or slow swimming. Try to be active 30 to 60 minutes every day.  You do not need to do all 30 to 60 minutes all at once. For example, you can exercise 3 times a day for 10 or 20 minutes. Moderate exercise is safe for most people, but it is always a good idea to talk to your doctor before starting an exercise program. 
· Keep moving. [place] Feeling the lawn, work in the garden, or [facility]. Take the stairs instead of the elevator at work. · If you smoke, quit. People who smoke have an increased risk for heart attack, stroke, cancer, and other lung illnesses. Quitting is hard, but there are ways to boost your chance of quitting tobacco for good. ? Use nicotine gum, patches, or lozenges. ? Ask your doctor about stop-smoking programs and medicines. ? Keep trying. In addition to reducing your risk of diseases in the future, you will notice some benefits soon after you stop using tobacco. If you have shortness of breath or asthma symptoms, they will likely get better within a few weeks after you quit. · Limit how much alcohol you drink. Moderate amounts of alcohol (up to 2 drinks a day for men, 1 drink a day for women) are okay. But drinking too much can lead to liver problems, high blood pressure, and other health problems. Family health If you have a family, there are many things you can do together to improve your health. · Eat meals together as a family as often as possible. · Eat healthy foods. This includes fruits, vegetables, lean meats and dairy, and whole grains. · Include your family in your fitness plan. Most people think of activities such as jogging or tennis as the way to fitness, but there are many ways you and your family can be more active. Anything that makes you breathe hard and gets your heart pumping is exercise. Here are some tips: 
? Walk to do errands or to take your child to school or the bus. 
? Go for a family bike ride after dinner instead of watching TV. Where can you learn more? Go to http://demarco-max.info/. Enter O861 in the search box to learn more about \"A Healthy Lifestyle: Care Instructions. \" Current as of: September 11, 2018 Content Version: 11.9 © 9479-1860 [employer]. Care instructions adapted under license by [employer] (which disclaims liability or warranty for this information). If you have questions about a medical condition or this instruction, always ask your healthcare professional. [place] [address] any warranty or liability for your use of this information.

## 2019-06-18 ENCOUNTER — OFFICE VISIT (OUTPATIENT)
Dept: OBGYN CLINIC | Age: 44
End: 2019-06-18

## 2019-06-18 VITALS
HEIGHT: 69 IN | DIASTOLIC BLOOD PRESSURE: 82 MMHG | WEIGHT: 190.13 LBS | SYSTOLIC BLOOD PRESSURE: 130 MMHG | BODY MASS INDEX: 28.16 KG/M2

## 2019-06-18 DIAGNOSIS — L68.0 HIRSUTIES: ICD-10-CM

## 2019-06-18 DIAGNOSIS — Z01.419 ENCOUNTER FOR GYNECOLOGICAL EXAMINATION WITHOUT ABNORMAL FINDING: Primary | ICD-10-CM

## 2019-06-18 NOTE — PROGRESS NOTES
Annual exam ages 40-58 post hysterectomy    Bolivar Alegria is a No obstetric history on file. ,  38 y.o. female [facility] Patient's last menstrual period was 03/18/2016. .    She presents for her annual checkup. She is having no significant problems. Hx TLH by Dr Amanda Worley due to fibroids. Hirsutism developed after surgery. Sent by Keven Alanis NP   Hormonal status:  She reports no perimenstrual type symptoms. She is not having vasomotor symptoms. The patient is not using any ERT. Sexual history:    She  reports that she currently engages in sexual activity and has had partners who are Female. She reports using the following method of birth control/protection: None. Medical conditions:    Since her last annual GYN exam about three or more years ago, she has not the following changes in her health history: none. Surgical history confirmed with patient. has a past surgical history that includes hx other surgical; hx other surgical; hx orthopaedic; hx orthopaedic; hx orthopaedic; and hx hysterectomy (4/14/16). Pap and Mammogram History:    Her most recent Pap smear was normal, obtained several year(s) ago. The patient has not had a recent mammogram.    Breast Cancer History/Substance Abuse: negative    Osteoporosis History:    Family history does not include a first or second degree relative with osteopenia or osteoporosis.       Past Medical History:   Diagnosis Date    Adverse effect of anesthesia     hard to wake up    Anxiety     Arthritis     back    Asperger syndrome     Asthma     Depression     Hypercholesterolemia     IBD (inflammatory bowel disease)     Ill-defined condition     discomfort hips shoulders knees and feet and hands MAURICIO was positive    Irritable bowel disease 1995    Neurological disorder     Psychiatric disorder     anxiety and depression     Past Surgical History:   Procedure Laterality Date    HX HYSTERECTOMY  4/14/16    da Dotty Robotic Total Laparoscopic Hysterectomy with bilateral  salpingectomy more than 250 grams. Cystoscopy.  HX ORTHOPAEDIC      Spinal fusion, back surgery,rods and screws    HX ORTHOPAEDIC      achilles lenghtening    HX ORTHOPAEDIC      laminectomy    HX OTHER SURGICAL      tumor removed from nose as child    HX OTHER SURGICAL      dental extraction       Current Outpatient Medications   Medication Sig Dispense Refill    diclofenac (VOLTAREN) 1 % gel Apply 2 g to affected area four (4) times daily. 100 g 2    acetaminophen (TYLENOL) 500 mg tablet Take 2 Tabs by mouth every six (6) hours as needed for Pain. 20 Tab 0    LORazepam (ATIVAN) 1 mg tablet Take 1 Tab by mouth two (2) times a day. Max Daily Amount: 2 mg. Prn anxiety  Indications: anxious 30 Tab 0    sertraline (ZOLOFT) 100 mg tablet TAKE TWO TABLETS BY MOUTH EVERY DAY 90 Tab 3    atorvastatin (LIPITOR) 20 mg tablet Take 1 Tab by mouth daily. 120 Tab 3    aspirin delayed-release 81 mg tablet Take  by mouth daily.  CETIRIZINE HCL (ZYRTEC PO) Take  by mouth as needed.  cholecalciferol (VITAMIN D3) 1,000 unit tablet Take 1 Tab by mouth daily. 60 Tab 11     Allergies: Latex; Sulfa (sulfonamide antibiotics); Adhesive tape-silicones; Morphine; and Neosporin [benzalkonium chloride]     Tobacco History:  reports that she quit smoking about 15 years ago. She has a 10.00 pack-year smoking history. She has never used smokeless tobacco.  Alcohol Abuse:  reports that she drinks alcohol. Drug Abuse:  reports that she has current or past drug history. Drug: Marijuana.     Family Medical/Cancer History:   Family History   Problem Relation Age of Onset    Cancer Mother     Heart Disease Father     Depression Father     Cancer Sister     Alzheimer Maternal Grandmother     Diabetes Maternal Grandfather     Heart Surgery Paternal Grandmother     Heart Surgery Paternal Grandfather         Review of Systems - History obtained from the patient  Constitutional: negative for weight loss, fever, night sweats  HEENT: negative for hearing loss, earache, congestion, snoring, sorethroat  CV: negative for chest pain, palpitations, edema  Resp: negative for cough, shortness of breath, wheezing  GI: negative for change in bowel habits, abdominal pain, black or bloody stools  : negative for frequency, dysuria, hematuria, vaginal discharge  MSK: negative for back pain, joint pain, muscle pain  Breast: negative for breast lumps, nipple discharge, galactorrhea  Skin :negative for itching, rash, hives  Neuro: negative for dizziness, headache, confusion, weakness  Psych: negative for anxiety, depression, change in mood  Heme/lymph: negative for bleeding, bruising, pallor    Physical Exam    Visit Vitals  Ht 5' 9\" (1.753 m)   Wt 190 lb 2 oz (86.2 kg)   LMP 03/18/2016   BMI 28.08 kg/m²     Constitutional  · Appearance: well-nourished, well developed, alert, in no acute distress    HENT  · Head and Face: appears normal      Chest  · Respiratory Effort: breathing unlabored    Breasts  · Inspection of Breasts: breasts symmetrical, no skin changes, no discharge present, nipple appearance normal, no skin retraction present  · Palpation of Breasts and Axillae: no masses present on palpation, no breast tenderness  · Axillary Lymph Nodes: no lymphadenopathy present    Gastrointestinal  · Abdominal Examination: abdomen non-tender to palpation, normal bowel sounds, no masses present  · Liver and spleen: no hepatomegaly present, spleen not palpable  · Hernias: no hernias identified    Genitourinary  · External Genitalia: normal appearance for age, no discharge present, no tenderness present, no inflammatory lesions present, no masses present, no atrophy present  · Vagina: normal vaginal vault without central or paravaginal defects, no discharge present, no inflammatory lesions present, no masses present  · Bladder: non-tender to palpation  · Urethra: appears normal  · Cervix: absent  · Uterus: absent  · Adnexa: no adnexal tenderness present, no adnexal masses present  · Perineum: perineum within normal limits, no evidence of trauma, no rashes or skin lesions present  · Anus: anus within normal limits, no hemorrhoids present  · Inguinal Lymph Nodes: no lymphadenopathy present    Skin  · General Inspection: no rash, no lesions identified    Neurologic/Psychiatric  · Mental Status:  · Orientation: grossly oriented to person, place and time  · Mood and Affect: mood normal, affect appropriate    Assessment:  Routine gynecologic examination  Hirsuitism; possible PCOS  Her current medical status is satisfactory with no evidence of significant gynecologic issues. Plan:  Checking labs and will consider possible metformin  Pap not today due to hysterectomy.   Counseled re: diet, exercise, healthy lifestyle  Return for yearly wellness visits  Rec annual mammogram

## 2019-06-18 NOTE — PATIENT INSTRUCTIONS

## 2019-06-19 DIAGNOSIS — E83.52 HYPERCALCEMIA: Primary | ICD-10-CM

## 2019-06-19 LAB
25(OH)D3+25(OH)D2 SERPL-MCNC: 30.6 NG/ML (ref 30–100)
ALBUMIN SERPL-MCNC: 5.5 G/DL (ref 3.5–5.5)
ALBUMIN/GLOB SERPL: 2.1 {RATIO} (ref 1.2–2.2)
ALP SERPL-CCNC: 86 IU/L (ref 39–117)
ALT SERPL-CCNC: 24 IU/L (ref 0–32)
AST SERPL-CCNC: 19 IU/L (ref 0–40)
BILIRUB SERPL-MCNC: 0.8 MG/DL (ref 0–1.2)
BUN SERPL-MCNC: 9 MG/DL (ref 6–24)
BUN/CREAT SERPL: 13 (ref 9–23)
CALCIUM SERPL-MCNC: 10.8 MG/DL (ref 8.7–10.2)
CHLORIDE SERPL-SCNC: 102 MMOL/L (ref 96–106)
CHOLEST SERPL-MCNC: 273 MG/DL (ref 100–199)
CO2 SERPL-SCNC: 24 MMOL/L (ref 20–29)
CREAT SERPL-MCNC: 0.68 MG/DL (ref 0.57–1)
GLOBULIN SER CALC-MCNC: 2.6 G/DL (ref 1.5–4.5)
GLUCOSE SERPL-MCNC: 95 MG/DL (ref 65–99)
HDLC SERPL-MCNC: 70 MG/DL
LDLC SERPL CALC-MCNC: 163 MG/DL (ref 0–99)
POTASSIUM SERPL-SCNC: 4.5 MMOL/L (ref 3.5–5.2)
PROT SERPL-MCNC: 8.1 G/DL (ref 6–8.5)
SODIUM SERPL-SCNC: 141 MMOL/L (ref 134–144)
TRIGL SERPL-MCNC: 199 MG/DL (ref 0–149)
VLDLC SERPL CALC-MCNC: 40 MG/DL (ref 5–40)

## 2019-06-19 NOTE — PROGRESS NOTES
Cholesterol has worsened, it was much better 11 months ago but still an improvement compared to a year ago. Are you still taking the lipitor (atorvastatin)? Vitamin D is good. Calcium is slightly up, this is new and has not been like that previously. I want to recheck this in a month.  I will send a lab slip to you in the mail for this

## 2019-06-20 LAB
DHEA-S SERPL-MCNC: 70.5 UG/DL (ref 57.3–279.2)
FSH SERPL-ACNC: 7 MIU/ML
LH SERPL-ACNC: 17.5 MIU/ML
PROLACTIN SERPL-MCNC: 14.3 NG/ML (ref 4.8–23.3)
T4 SERPL-MCNC: 6.1 UG/DL (ref 4.5–12)
TSH SERPL DL<=0.005 MIU/L-ACNC: 5.19 UIU/ML (ref 0.45–4.5)

## 2019-06-20 RX ORDER — LEVOTHYROXINE SODIUM 25 UG/1
25 TABLET ORAL
Qty: 30 TAB | Refills: 12 | Status: SHIPPED | OUTPATIENT
Start: 2019-06-20 | End: 2019-07-26

## 2019-06-20 NOTE — PROGRESS NOTES
Altamese Gilford-  I think a trial of a low dose of thyroid medicine might be helpful. We've sent in a prescription. Take a tablet daily and we should recheck a blood test in 6-8 weeks. If this doesn't improve things for you, we can readdress the possible use of metformin.

## 2019-07-19 DIAGNOSIS — E83.52 HYPERCALCEMIA: ICD-10-CM

## 2019-07-26 ENCOUNTER — OFFICE VISIT (OUTPATIENT)
Dept: OBGYN CLINIC | Age: 44
End: 2019-07-26

## 2019-07-26 VITALS — BODY MASS INDEX: 28.73 KG/M2 | WEIGHT: 194 LBS | HEIGHT: 69 IN

## 2019-07-26 DIAGNOSIS — F41.9 ANXIETY: ICD-10-CM

## 2019-07-26 DIAGNOSIS — E28.2 PCOS (POLYCYSTIC OVARIAN SYNDROME): Primary | ICD-10-CM

## 2019-07-26 RX ORDER — METFORMIN HYDROCHLORIDE 500 MG/1
500 TABLET, EXTENDED RELEASE ORAL
Qty: 30 TAB | Refills: 12 | Status: SHIPPED | OUTPATIENT
Start: 2019-07-26 | End: 2019-08-10

## 2019-07-26 NOTE — PROGRESS NOTES
Moi Paulino is a 38 y.o. female who is following up from recent visit. Had abnormal thyroid labs and was started on levothyroxine. She reports worsening sweats, and overall ill feeling on the medication so she stopped it. Took thyroid meds only for several days but symptoms. She also reports bilateral pelvic pain several times a month, s/p total hysterectomy; has both ovaries. Has since been advised by PCP that her vitamin D is low and Calcium is elevated. Frustrated with hirsuitism and acne    Her current method of family planning is none - same sex partner. The patient is sexually active. Her relevant past medical history:   Past Medical History:   Diagnosis Date    Adverse effect of anesthesia     hard to wake up    Anxiety     Arthritis     back    Asperger syndrome     Asthma     Depression     Hypercholesterolemia     IBD (inflammatory bowel disease)     Ill-defined condition     discomfort hips shoulders knees and feet and hands MAURICIO was positive    Irritable bowel disease 1995    Neurological disorder     Psychiatric disorder     anxiety and depression        Past Surgical History:   Procedure Laterality Date    HX HYSTERECTOMY  4/14/16    da Dotty Robotic Total Laparoscopic Hysterectomy with bilateral  salpingectomy more than 250 grams. Cystoscopy. 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   Tobacco Use    Smoking status: Former Smoker     Packs/day: 1.00     Years: 10.00     Pack years: 10.00     Last attempt to quit: 4/7/2004     Years since quitting: 15.3    Smokeless tobacco: Never Used   Substance and Sexual Activity    Alcohol use: Yes    Drug use:  Yes Types: Marijuana     Comment: 2x/week     Sexual activity: Yes     Partners: Female     Birth control/protection: None     Family History   Problem Relation Age of Onset    Cancer Mother     Heart Disease Father     Depression Father     Cancer Sister     Alzheimer Maternal Grandmother     Diabetes Maternal Grandfather     Heart Surgery Paternal Grandmother     Heart Surgery Paternal Grandfather        Allergies   Allergen Reactions    Latex Rash     cellulitis    Sulfa (Sulfonamide Antibiotics) Anaphylaxis    Adhesive Tape-Silicones Rash    Morphine Rash    Neosporin [Benzalkonium Chloride] Rash     Prior to Admission medications    Medication Sig Start Date End Date Taking? Authorizing Provider   LORazepam (ATIVAN) 1 mg tablet Take 1 Tab by mouth two (2) times a day. Max Daily Amount: 2 mg. Prn anxiety  Indications: anxious 4/5/19  Yes Skiff, Madeline Cool, NP   sertraline (ZOLOFT) 100 mg tablet TAKE TWO TABLETS BY MOUTH EVERY DAY 11/8/18  Yes Skiff, Madeline Cool, NP   atorvastatin (LIPITOR) 20 mg tablet Take 1 Tab by mouth daily. 7/17/18  Yes Skiff, Madeline Cool, NP   aspirin delayed-release 81 mg tablet Take  by mouth daily. Yes Provider, Historical   CETIRIZINE HCL (ZYRTEC PO) Take  by mouth as needed. Yes Provider, Historical   levothyroxine (SYNTHROID) 25 mcg tablet Take 1 Tab by mouth Daily (before breakfast). 9/96/99   Mehrdad Willis MD   diclofenac (VOLTAREN) 1 % gel Apply 2 g to affected area four (4) times daily. 4/11/19   Skiff, Madeline Cool, NP   acetaminophen (TYLENOL) 500 mg tablet Take 2 Tabs by mouth every six (6) hours as needed for Pain. 4/8/19   Sylvia Maguire MD   cholecalciferol (VITAMIN D3) 1,000 unit tablet Take 1 Tab by mouth daily.  9/5/17   Skiff, Madeline Cool, NP        Review of Systems - History obtained from the patient  Constitutional: negative for weight loss, fever, night sweats  HEENT: negative for hearing loss, earache, congestion, snoring, sorethroat  CV: negative for chest pain, palpitations, edema  Resp: negative for cough, shortness of breath, wheezing  Breast: negative for breast lumps, nipple discharge, galactorrhea  GI: negative for change in bowel habits, abdominal pain, black or bloody stools  : negative for frequency, dysuria, hematuria  MSK: negative for back pain, joint pain, muscle pain  Skin: negative for itching, rash, hives  Neuro: negative for dizziness, headache, confusion, weakness  Psych: negative for anxiety, depression, change in mood  Heme/lymph: negative for bleeding, bruising, pallor      Objective:  Visit Vitals  Ht 5' 9\" (1.753 m)   Wt 194 lb (88 kg)   LMP 03/18/2016   BMI 28.65 kg/m²          PHYSICAL EXAMINATION    Constitutional  · Appearance: well-nourished, well developed, alert, in no acute distress    HENT  · Head and Face: appears normal    Neck  · Inspection/Palpation: normal appearance, no masses or tenderness  · Lymph Nodes: no lymphadenopathy present  · Thyroid: gland size normal, nontender, no nodules or masses present on palpation  ·   ·      Gastrointestinal  · Abdominal Examination: abdomen non-tender to palpation, normal bowel sounds, no masses present  · Liver and spleen: no hepatomegaly present, spleen not palpable  · Hernias: no hernias identified    Genitourinary  · External Genitalia: normal appearance for age, no discharge present, no tenderness present, no inflammatory lesions present, no masses present, no atrophy present  · Vagina: normal vaginal vault without central or paravaginal defects, no discharge present, no inflammatory lesions present, no masses present  · Bladder: non-tender to palpation  · Urethra: appears normal  · Cervix: absent  · Uterus: absent  · Adnexa: no adnexal tenderness present, no adnexal masses present  · Perineum: perineum within normal limits, no evidence of trauma, no rashes or skin lesions present  · Anus: anus within normal limits, no hemorrhoids present  · Inguinal Lymph Nodes: no lymphadenopathy present    Skin  · General Inspection: no rash, no lesions identified    Neurologic/Psychiatric  · Mental Status:  · Orientation: grossly oriented to person, place and time  · Mood and Affect: mood normal, affect appropriate    Assessment:    suspect PCOS    Plan:   Trial of metformin; Risks and benefits fully reviewed and all questions answered.

## 2019-07-27 DIAGNOSIS — E78.5 HYPERLIPIDEMIA, UNSPECIFIED HYPERLIPIDEMIA TYPE: ICD-10-CM

## 2019-07-27 LAB
CALCIUM SERPL-MCNC: 9.9 MG/DL (ref 8.7–10.2)
PTH-INTACT SERPL-MCNC: 30 PG/ML (ref 15–65)

## 2019-07-29 DIAGNOSIS — R79.89 ELEVATED TSH: ICD-10-CM

## 2019-07-29 DIAGNOSIS — E28.2 PCOS (POLYCYSTIC OVARIAN SYNDROME): Primary | ICD-10-CM

## 2019-07-29 RX ORDER — LORAZEPAM 1 MG/1
1 TABLET ORAL 2 TIMES DAILY
Qty: 30 TAB | Refills: 0 | Status: SHIPPED | OUTPATIENT
Start: 2019-07-29 | End: 2019-10-08 | Stop reason: SDUPTHER

## 2019-07-29 RX ORDER — ATORVASTATIN CALCIUM 20 MG/1
TABLET, FILM COATED ORAL
Qty: 90 TAB | Refills: 3 | Status: SHIPPED | OUTPATIENT
Start: 2019-07-29 | End: 2020-08-12 | Stop reason: SDUPTHER

## 2019-08-08 DIAGNOSIS — F43.10 PTSD (POST-TRAUMATIC STRESS DISORDER): ICD-10-CM

## 2019-08-08 RX ORDER — SERTRALINE HYDROCHLORIDE 100 MG/1
TABLET, FILM COATED ORAL
Qty: 180 TAB | Refills: 2 | Status: SHIPPED | OUTPATIENT
Start: 2019-08-08 | End: 2020-04-22 | Stop reason: SDUPTHER

## 2019-08-10 ENCOUNTER — APPOINTMENT (OUTPATIENT)
Dept: ULTRASOUND IMAGING | Age: 44
End: 2019-08-10
Attending: EMERGENCY MEDICINE
Payer: COMMERCIAL

## 2019-08-10 ENCOUNTER — APPOINTMENT (OUTPATIENT)
Dept: GENERAL RADIOLOGY | Age: 44
End: 2019-08-10
Attending: EMERGENCY MEDICINE
Payer: COMMERCIAL

## 2019-08-10 ENCOUNTER — HOSPITAL ENCOUNTER (EMERGENCY)
Age: 44
Discharge: HOME | End: 2019-08-10
Attending: EMERGENCY MEDICINE
Payer: COMMERCIAL

## 2019-08-10 VITALS
DIASTOLIC BLOOD PRESSURE: 89 MMHG | SYSTOLIC BLOOD PRESSURE: 139 MMHG | HEIGHT: 66 IN | OXYGEN SATURATION: 94 % | RESPIRATION RATE: 16 BRPM | BODY MASS INDEX: 30.44 KG/M2 | TEMPERATURE: 98.4 F | HEART RATE: 84 BPM | WEIGHT: 189.38 LBS

## 2019-08-10 DIAGNOSIS — R19.7 DIARRHEA, UNSPECIFIED TYPE: Primary | ICD-10-CM

## 2019-08-10 DIAGNOSIS — R07.9 ACUTE CHEST PAIN: ICD-10-CM

## 2019-08-10 DIAGNOSIS — K76.0 HEPATIC STEATOSIS: ICD-10-CM

## 2019-08-10 DIAGNOSIS — R10.84 ABDOMINAL PAIN, GENERALIZED: ICD-10-CM

## 2019-08-10 LAB
ALBUMIN SERPL-MCNC: 4.1 G/DL (ref 3.5–5)
ALBUMIN/GLOB SERPL: 1.1 {RATIO} (ref 1.1–2.2)
ALP SERPL-CCNC: 85 U/L (ref 45–117)
ALT SERPL-CCNC: 43 U/L (ref 12–78)
ANION GAP SERPL CALC-SCNC: 6 MMOL/L (ref 5–15)
AST SERPL-CCNC: 21 U/L (ref 15–37)
BASOPHILS # BLD: 0.1 K/UL (ref 0–0.1)
BASOPHILS NFR BLD: 1 % (ref 0–1)
BILIRUB SERPL-MCNC: 0.9 MG/DL (ref 0.2–1)
BUN SERPL-MCNC: 9 MG/DL (ref 6–20)
BUN/CREAT SERPL: 11 (ref 12–20)
CALCIUM SERPL-MCNC: 9.4 MG/DL (ref 8.5–10.1)
CHLORIDE SERPL-SCNC: 109 MMOL/L (ref 97–108)
CO2 SERPL-SCNC: 24 MMOL/L (ref 21–32)
CREAT SERPL-MCNC: 0.8 MG/DL (ref 0.55–1.02)
DIFFERENTIAL METHOD BLD: ABNORMAL
EOSINOPHIL # BLD: 0.4 K/UL (ref 0–0.4)
EOSINOPHIL NFR BLD: 5 % (ref 0–7)
ERYTHROCYTE [DISTWIDTH] IN BLOOD BY AUTOMATED COUNT: 12.9 % (ref 11.5–14.5)
GLOBULIN SER CALC-MCNC: 3.9 G/DL (ref 2–4)
GLUCOSE SERPL-MCNC: 107 MG/DL (ref 65–100)
HCT VFR BLD AUTO: 46.6 % (ref 35–47)
HGB BLD-MCNC: 15.6 G/DL (ref 11.5–16)
IMM GRANULOCYTES # BLD AUTO: 0 K/UL (ref 0–0.04)
IMM GRANULOCYTES NFR BLD AUTO: 0 % (ref 0–0.5)
LIPASE SERPL-CCNC: 232 U/L (ref 73–393)
LYMPHOCYTES # BLD: 2.3 K/UL (ref 0.8–3.5)
LYMPHOCYTES NFR BLD: 24 % (ref 12–49)
MCH RBC QN AUTO: 28.8 PG (ref 26–34)
MCHC RBC AUTO-ENTMCNC: 33.5 G/DL (ref 30–36.5)
MCV RBC AUTO: 86 FL (ref 80–99)
MONOCYTES # BLD: 0.8 K/UL (ref 0–1)
MONOCYTES NFR BLD: 8 % (ref 5–13)
NEUTS SEG # BLD: 6 K/UL (ref 1.8–8)
NEUTS SEG NFR BLD: 62 % (ref 32–75)
NRBC # BLD: 0 K/UL (ref 0–0.01)
NRBC BLD-RTO: 0 PER 100 WBC
PLATELET # BLD AUTO: 228 K/UL (ref 150–400)
PMV BLD AUTO: 9.6 FL (ref 8.9–12.9)
POTASSIUM SERPL-SCNC: 4 MMOL/L (ref 3.5–5.1)
PROT SERPL-MCNC: 8 G/DL (ref 6.4–8.2)
RBC # BLD AUTO: 5.42 M/UL (ref 3.8–5.2)
SODIUM SERPL-SCNC: 139 MMOL/L (ref 136–145)
WBC # BLD AUTO: 9.7 K/UL (ref 3.6–11)

## 2019-08-10 PROCEDURE — 71046 X-RAY EXAM CHEST 2 VIEWS: CPT

## 2019-08-10 PROCEDURE — 74011250636 HC RX REV CODE- 250/636: Performed by: EMERGENCY MEDICINE

## 2019-08-10 PROCEDURE — 96374 THER/PROPH/DIAG INJ IV PUSH: CPT

## 2019-08-10 PROCEDURE — 83690 ASSAY OF LIPASE: CPT

## 2019-08-10 PROCEDURE — 76700 US EXAM ABDOM COMPLETE: CPT

## 2019-08-10 PROCEDURE — 80053 COMPREHEN METABOLIC PANEL: CPT

## 2019-08-10 PROCEDURE — 85025 COMPLETE CBC W/AUTO DIFF WBC: CPT

## 2019-08-10 PROCEDURE — 99284 EMERGENCY DEPT VISIT MOD MDM: CPT

## 2019-08-10 PROCEDURE — 93005 ELECTROCARDIOGRAM TRACING: CPT

## 2019-08-10 PROCEDURE — 36415 COLL VENOUS BLD VENIPUNCTURE: CPT

## 2019-08-10 PROCEDURE — 96375 TX/PRO/DX INJ NEW DRUG ADDON: CPT

## 2019-08-10 RX ORDER — NAPROXEN 500 MG/1
500 TABLET ORAL
Qty: 20 TAB | Refills: 0 | Status: SHIPPED | OUTPATIENT
Start: 2019-08-10 | End: 2019-10-08

## 2019-08-10 RX ORDER — ONDANSETRON 4 MG/1
4 TABLET, ORALLY DISINTEGRATING ORAL
Qty: 10 TAB | Refills: 0 | Status: SHIPPED | OUTPATIENT
Start: 2019-08-10 | End: 2019-10-08

## 2019-08-10 RX ORDER — KETOROLAC TROMETHAMINE 30 MG/ML
15 INJECTION, SOLUTION INTRAMUSCULAR; INTRAVENOUS ONCE
Status: COMPLETED | OUTPATIENT
Start: 2019-08-10 | End: 2019-08-10

## 2019-08-10 RX ORDER — CIPROFLOXACIN 500 MG/1
500 TABLET ORAL 2 TIMES DAILY
Qty: 14 TAB | Refills: 0 | Status: SHIPPED | OUTPATIENT
Start: 2019-08-10 | End: 2019-08-17

## 2019-08-10 RX ORDER — ONDANSETRON 2 MG/ML
4 INJECTION INTRAMUSCULAR; INTRAVENOUS
Status: COMPLETED | OUTPATIENT
Start: 2019-08-10 | End: 2019-08-10

## 2019-08-10 RX ADMIN — KETOROLAC TROMETHAMINE 15 MG: 30 INJECTION, SOLUTION INTRAMUSCULAR at 10:26

## 2019-08-10 RX ADMIN — ONDANSETRON 4 MG: 2 INJECTION INTRAMUSCULAR; INTRAVENOUS at 10:26

## 2019-08-10 NOTE — ED PROVIDER NOTES
EMERGENCY DEPARTMENT HISTORY AND PHYSICAL EXAM      Date: 8/10/2019  Patient Name: Brice Elizabeth    History of Presenting Illness     Chief Complaint   Patient presents with    Chest Pain     times two weeks    Bloated     complains of feeling bloated cordell two weeks        History Provided By: Patient    HPI: Brice Elizabeth, 38 y.o. female  presents to the ED with cc of chest pain and abdominal discomfort. Patient states she has chest pain for 2 weeks in the left upper chest radiating into the left paracervical muscles. She states the pain is worse with palpation. She has not had any injury or pulled any muscles. For the same amount of time she has had abdominal discomfort with nausea and diarrhea. She states her stool has been very yellow and watery. Her abdominal discomfort is diffuse and nonfocal.  She has not had any vomiting. She has had decreased appetite and has been pretty much eating light meals. She states she has had abdominal discomfort in the past related to gallbladder however has not had it removed and saw gastroenterology who was not entirely concerned about it. Denies urinary symptoms. She states she has had low-grade fever of no more than 100 °F.  Denies shortness of breath. There are no other complaints, changes, or physical findings at this time. PCP: Melissa Franco NP    No current facility-administered medications on file prior to encounter. Current Outpatient Medications on File Prior to Encounter   Medication Sig Dispense Refill    sertraline (ZOLOFT) 100 mg tablet TAKE 2 TABLETS BY MOUTH EVERY  Tab 2    LORazepam (ATIVAN) 1 mg tablet Take 1 Tab by mouth two (2) times a day. Max Daily Amount: 2 mg. Prn anxiety  Indications: anxious 30 Tab 0    atorvastatin (LIPITOR) 20 mg tablet TAKE 1 TABLET BY MOUTH EVERY DAY 90 Tab 3    aspirin delayed-release 81 mg tablet Take  by mouth daily.  CETIRIZINE HCL (ZYRTEC PO) Take  by mouth as needed.          Past History     Past Medical History:  Past Medical History:   Diagnosis Date    Adverse effect of anesthesia     hard to wake up    Anxiety     Arthritis     back    Asperger syndrome     Asthma     Depression     Hypercholesterolemia     IBD (inflammatory bowel disease)     Ill-defined condition     discomfort hips shoulders knees and feet and hands MAURICIO was positive    Irritable bowel disease 1995    Neurological disorder     Psychiatric disorder     anxiety and depression       Past Surgical History:  Past Surgical History:   Procedure Laterality Date    HX HYSTERECTOMY  4/14/16    da Dotty Robotic Total Laparoscopic Hysterectomy with bilateral  salpingectomy more than 250 grams. Cystoscopy.  HX ORTHOPAEDIC      Spinal fusion, back surgery,rods and screws    HX ORTHOPAEDIC      achilles lenghtening    HX ORTHOPAEDIC      laminectomy    HX OTHER SURGICAL      tumor removed from nose as child    HX OTHER SURGICAL      dental extraction       Family History:  Family History   Problem Relation Age of Onset    Cancer Mother     Heart Disease Father     Depression Father     Cancer Sister     Alzheimer Maternal Grandmother     Diabetes Maternal Grandfather     Heart Surgery Paternal Grandmother     Heart Surgery Paternal Grandfather        Social History:  Social History     Tobacco Use    Smoking status: Former Smoker     Packs/day: 1.00     Years: 10.00     Pack years: 10.00     Last attempt to quit: 4/7/2004     Years since quitting: 15.3    Smokeless tobacco: Never Used   Substance Use Topics    Alcohol use: Yes    Drug use: Yes     Types: Marijuana     Comment: 2x/week        Allergies: Allergies   Allergen Reactions    Latex Rash     cellulitis    Sulfa (Sulfonamide Antibiotics) Anaphylaxis    Adhesive Tape-Silicones Rash    Morphine Rash    Neosporin [Benzalkonium Chloride] Rash         Review of Systems   Review of Systems   Constitutional: Positive for fever.  Negative for chills. HENT: Negative for congestion, ear pain, rhinorrhea, sore throat and trouble swallowing. Eyes: Negative for visual disturbance. Respiratory: Negative for cough, chest tightness and shortness of breath. Cardiovascular: Positive for chest pain. Negative for palpitations. Gastrointestinal: Positive for abdominal pain, diarrhea and nausea. Negative for blood in stool, constipation and vomiting. Genitourinary: Negative for decreased urine volume, difficulty urinating, dysuria and frequency. Musculoskeletal: Negative for back pain and neck pain. Skin: Negative for color change and rash. Neurological: Negative for dizziness, weakness, light-headedness and headaches. Physical Exam   Physical Exam   Constitutional: She is oriented to person, place, and time. She appears well-developed and well-nourished. She does not appear ill. No distress. HENT:   Mouth/Throat: Oropharynx is clear and moist.   Eyes: Conjunctivae are normal.   Neck: Neck supple. Cardiovascular: Normal rate and regular rhythm. Pulmonary/Chest: Effort normal and breath sounds normal. No accessory muscle usage. No respiratory distress. Abdominal: Soft. She exhibits no distension. There is tenderness in the right upper quadrant. There is no rebound, no guarding and no CVA tenderness. Lymphadenopathy:     She has no cervical adenopathy. Neurological: She is alert and oriented to person, place, and time. She has normal strength. No cranial nerve deficit or sensory deficit. Skin: Skin is warm and dry. Nursing note and vitals reviewed.       Diagnostic Study Results     Labs -     Recent Results (from the past 24 hour(s))   EKG, 12 LEAD, INITIAL    Collection Time: 08/10/19  9:50 AM   Result Value Ref Range    Ventricular Rate 88 BPM    Atrial Rate 88 BPM    P-R Interval 158 ms    QRS Duration 82 ms    Q-T Interval 366 ms    QTC Calculation (Bezet) 442 ms    Calculated P Axis 37 degrees    Calculated R Axis 4 degrees Calculated T Axis 27 degrees    Diagnosis       Normal sinus rhythm  Normal ECG  No previous ECGs available     METABOLIC PANEL, COMPREHENSIVE    Collection Time: 08/10/19 10:01 AM   Result Value Ref Range    Sodium 139 136 - 145 mmol/L    Potassium 4.0 3.5 - 5.1 mmol/L    Chloride 109 (H) 97 - 108 mmol/L    CO2 24 21 - 32 mmol/L    Anion gap 6 5 - 15 mmol/L    Glucose 107 (H) 65 - 100 mg/dL    BUN 9 6 - 20 MG/DL    Creatinine 0.80 0.55 - 1.02 MG/DL    BUN/Creatinine ratio 11 (L) 12 - 20      GFR est AA >60 >60 ml/min/1.73m2    GFR est non-AA >60 >60 ml/min/1.73m2    Calcium 9.4 8.5 - 10.1 MG/DL    Bilirubin, total 0.9 0.2 - 1.0 MG/DL    ALT (SGPT) 43 12 - 78 U/L    AST (SGOT) 21 15 - 37 U/L    Alk. phosphatase 85 45 - 117 U/L    Protein, total 8.0 6.4 - 8.2 g/dL    Albumin 4.1 3.5 - 5.0 g/dL    Globulin 3.9 2.0 - 4.0 g/dL    A-G Ratio 1.1 1.1 - 2.2     LIPASE    Collection Time: 08/10/19 10:01 AM   Result Value Ref Range    Lipase 232 73 - 393 U/L   CBC WITH AUTOMATED DIFF    Collection Time: 08/10/19 10:01 AM   Result Value Ref Range    WBC 9.7 3.6 - 11.0 K/uL    RBC 5.42 (H) 3.80 - 5.20 M/uL    HGB 15.6 11.5 - 16.0 g/dL    HCT 46.6 35.0 - 47.0 %    MCV 86.0 80.0 - 99.0 FL    MCH 28.8 26.0 - 34.0 PG    MCHC 33.5 30.0 - 36.5 g/dL    RDW 12.9 11.5 - 14.5 %    PLATELET 413 881 - 003 K/uL    MPV 9.6 8.9 - 12.9 FL    NRBC 0.0 0  WBC    ABSOLUTE NRBC 0.00 0.00 - 0.01 K/uL    NEUTROPHILS 62 32 - 75 %    LYMPHOCYTES 24 12 - 49 %    MONOCYTES 8 5 - 13 %    EOSINOPHILS 5 0 - 7 %    BASOPHILS 1 0 - 1 %    IMMATURE GRANULOCYTES 0 0.0 - 0.5 %    ABS. NEUTROPHILS 6.0 1.8 - 8.0 K/UL    ABS. LYMPHOCYTES 2.3 0.8 - 3.5 K/UL    ABS. MONOCYTES 0.8 0.0 - 1.0 K/UL    ABS. EOSINOPHILS 0.4 0.0 - 0.4 K/UL    ABS. BASOPHILS 0.1 0.0 - 0.1 K/UL    ABS. IMM. GRANS. 0.0 0.00 - 0.04 K/UL    DF AUTOMATED         Radiologic Studies -   XR CHEST PA LAT   Final Result   IMPRESSION:    Clear lungs.       US ABD COMP   Final Result IMPRESSION:    Severe hepatic steatosis. CT Results  (Last 48 hours)    None        CXR Results  (Last 48 hours)               08/10/19 1157  XR CHEST PA LAT Final result    Impression:  IMPRESSION:    Clear lungs. Narrative:  PA AND LATERAL CHEST RADIOGRAPHS: 8/10/2019 11:57 AM       INDICATION: Chest pain. COMPARISON: None. TECHNIQUE: Frontal and lateral radiographs of the chest.       FINDINGS:   The lungs are clear. The central airways are patent. The heart size is normal.   No pneumothorax or pleural effusion. Medical Decision Making   I am the first provider for this patient. I reviewed the vital signs, available nursing notes, past medical history, past surgical history, family history and social history. Vital Signs-Reviewed the patient's vital signs. Patient Vitals for the past 24 hrs:   Temp Pulse Resp BP SpO2   08/10/19 1208  84 16 139/89 94 %   08/10/19 1100  90 21 (!) 134/91 97 %   08/10/19 1043  86 16  94 %   08/10/19 1030  77 13 122/81    08/10/19 1020  79 16 143/85    08/10/19 0943 98.4 °F (36.9 °C) 99 16 (!) 141/103 97 %       EKG interpretation: (Preliminary)  Rhythm: normal sinus rhythm; and regular . Rate (approx.): 88; Axis: normal; MT interval: normal; QRS interval: normal ; ST/T wave: normal.    Records Reviewed: Nursing Notes and Old Medical Records    Provider Notes (Medical Decision Making):   Patient presents with a complaint of abdominal discomfort associated with diarrhea for several weeks. She also has pain in the left upper part of her chest that is reproducible with palpation. The 2 complaints may be related. The chest pain may be more or less just musculoskeletal.  I do not suspect ACS. Do not suspect PE. Her ultrasound shows severe hepatic steatosis. Gallbladder appears okay. Liver enzymes and bilirubin are normal.  Her labs are overall very unremarkable.   Liver enzymes are normal.  Pancreatic lipase is normal.  No acute kidney injury. Since she has been having diarrhea for 2 weeks I will start her on Cipro. Recommend that she follow-up with gastroenterology. ED Course:   Initial assessment performed. The patients presenting problems have been discussed, and they are in agreement with the care plan formulated and outlined with them. I have encouraged them to ask questions as they arise throughout their visit. Orders Placed This Encounter    US ABD COMP    XR CHEST PA LAT    COMPREHENSIVE METABOLIC PANEL    LIPASE    CBC WITH AUTOMATED DIFF    EKG 12 LEAD INITIAL    SALINE LOCK IV ONE TIME STAT    ondansetron (ZOFRAN) injection 4 mg    ketorolac (TORADOL) injection 15 mg    ciprofloxacin HCl (CIPRO) 500 mg tablet    ondansetron (ZOFRAN ODT) 4 mg disintegrating tablet    naproxen (NAPROSYN) 500 mg tablet         Critical Care Time:   0    Disposition:  Discharge  12:30 PM    The patient's emergency department evaluation is now complete. I have reviewed all labs, imaging, and pertinent information. I have discussed all results with the patient and/or family. Based on our evaluation today I do believe that the patient is safe to be discharged home. The patient has been provided with at home instructions that are pertinent to their complaint today, although these may not be specific to the exact diagnosis. I have reviewed the patient's home medications and attempted to reconcile if not already done so by pharmacy or nursing staff. I have discussed all new prescriptions with the patient. The patient has been encouraged to follow-up with primary care doctor and/or specialist, and these have been discussed with the patient. The patient has been advised that they may return to the emergency department if they have any worsening symptoms and or new symptoms that are of concern to them.   Verbal discharge instructions may have also been provided to the patient that may not be specifically contained in the written discharge instructions. The patient has been given opportunity to ask questions prior to discharge. PLAN:  1. Current Discharge Medication List      START taking these medications    Details   ciprofloxacin HCl (CIPRO) 500 mg tablet Take 1 Tab by mouth two (2) times a day for 7 days. Qty: 14 Tab, Refills: 0      ondansetron (ZOFRAN ODT) 4 mg disintegrating tablet Take 1 Tab by mouth every eight (8) hours as needed for Nausea. Qty: 10 Tab, Refills: 0      naproxen (NAPROSYN) 500 mg tablet Take 1 Tab by mouth every twelve (12) hours as needed for Pain. Qty: 20 Tab, Refills: 0           2. Follow-up Information     Follow up With Specialties Details Why Contact Info    Lilo Crespo MD Gastroenterology Schedule an appointment as soon as possible for a visit   [address] [place] [address]  [phone]      Burt Marr NP Nurse Practitioner Schedule an appointment as soon as possible for a visit in 1 week  [place] [address]  [phone]          Return to ED if worse     Diagnosis     Clinical Impression:   1. Diarrhea, unspecified type    2. Hepatic steatosis    3. Abdominal pain, generalized    4. Acute chest pain            This note will not be viewable in MyChart.

## 2019-08-11 LAB
ATRIAL RATE: 88 BPM
CALCULATED P AXIS, ECG09: 37 DEGREES
CALCULATED R AXIS, ECG10: 4 DEGREES
CALCULATED T AXIS, ECG11: 27 DEGREES
DIAGNOSIS, 93000: NORMAL
P-R INTERVAL, ECG05: 158 MS
Q-T INTERVAL, ECG07: 366 MS
QRS DURATION, ECG06: 82 MS
QTC CALCULATION (BEZET), ECG08: 442 MS
VENTRICULAR RATE, ECG03: 88 BPM

## 2019-08-13 ENCOUNTER — TELEPHONE (OUTPATIENT)
Dept: INTERNAL MEDICINE CLINIC | Facility: CLINIC | Age: 44
End: 2019-08-13

## 2019-08-13 NOTE — TELEPHONE ENCOUNTER
----- Message from Nicole Lopez NP sent at 8/13/2019  1:42 PM EDT -----  Regarding: Needs follow up with GI  Shady Tripp,    Please call her and make sure she has a follow up scheduled with GI for liver enlargement. This should be set up ASAP. If she does not have this done can you help her schedule it?

## 2019-08-13 NOTE — TELEPHONE ENCOUNTER
Call placed to patient in reference to follow up appointment per [facility] CANDIDA ESCALANTE. Patient states she has an appointment with the [facility] on 8/27/19 and would like to cancel her appointment here on 8/15/19. [address] [facility] office staff notified to cancel appointment.   Christiano Centeno LPN

## 2019-08-27 ENCOUNTER — OFFICE VISIT (OUTPATIENT)
Dept: HEMATOLOGY | Age: 44
End: 2019-08-27

## 2019-08-27 VITALS
OXYGEN SATURATION: 97 % | DIASTOLIC BLOOD PRESSURE: 66 MMHG | TEMPERATURE: 97.4 F | HEIGHT: 66 IN | SYSTOLIC BLOOD PRESSURE: 116 MMHG | HEART RATE: 75 BPM | WEIGHT: 189.6 LBS | BODY MASS INDEX: 30.47 KG/M2

## 2019-08-27 DIAGNOSIS — K76.0 FATTY LIVER: Primary | ICD-10-CM

## 2019-08-27 PROBLEM — E66.9 OBESE: Status: ACTIVE | Noted: 2019-08-27

## 2019-08-27 NOTE — Clinical Note
9/15/19 Patient: Collin Peng  
YOB: 1975 Date of Visit: 8/27/2019 Yennifer Ellis NP 
[facility] [address] [id number] VIA In Basket Dear Yennifer Ellis NP, Thank you for referring Ms. Bria Miguel to [address] [facility] for evaluation. My notes for this consultation are attached. If you have questions, please do not hesitate to call me. I look forward to following your patient along with you. Sincerely, Tom Flores MD

## 2019-08-27 NOTE — PROGRESS NOTES
[address] [facility], Josue MUÑOZ Heads, MD Akbar Ferguson PA-C Windy Flurry, ACNP-BC     April S Alexis, [facility]   JAVIER Granados-MATI Akins, [facility]       [address]    at [place] [address] [address] [facility], [address] [address]  22.    [phone]    FAX: [address] [place] [address] Drive, [address], [address]    [phone]    FAX: [phone]       Patient Care Team:  Jane George NP as PCP - General (Nurse Practitioner)  Maryann Adamson MD (Gynecology)      Problem List  Date Reviewed: 7/26/2019          Codes Class Noted    Fatty liver ICD-10-CM: K76.0  ICD-9-CM: 571.8  9/15/2019        Obese ICD-10-CM: E66.9  ICD-9-CM: 278.00  8/27/2019        Hypercalcemia ICD-10-CM: L58.07  ICD-9-CM: 275.42  6/19/2019        Abnormal mammogram ICD-10-CM: R92.8  ICD-9-CM: 793.80  12/19/2017    Overview Signed 12/19/2017  9:43 AM by Jane George NP     Mammo results 12/19/17:  IMPRESSION: Decreased conspicuity of the left breast asymmetry. No evidence for  malignancy in the left breast.    Recommendation:  Bilateral screening mammography in June 2018.     BI-RADS Assessment Category 2: Benign finding             PTSD (post-traumatic stress disorder) ICD-10-CM: F43.10  ICD-9-CM: 309.81  11/29/2017    Overview Signed 11/29/2017  9:14 AM by Jane George NP     She has been diagnosed with complex PTSD             Irritable bowel syndrome with diarrhea ICD-10-CM: K58.0  ICD-9-CM: 564.1  11/29/2017        Elevated BP without diagnosis of hypertension ICD-10-CM: R03.0  ICD-9-CM: 796.2  9/18/2017        Arthralgia ICD-10-CM: M25.50  ICD-9-CM: 719.40  8/19/2016        Hyperlipidemia ICD-10-CM: E78.5  ICD-9-CM: 272.4 8/19/2016        Vitamin D deficiency ICD-10-CM: E55.9  ICD-9-CM: 268.9  8/19/2016        Anxiety ICD-10-CM: F41.9  ICD-9-CM: 300.00  8/19/2016        DDD (degenerative disc disease), lumbar ICD-10-CM: M51.36  ICD-9-CM: 722.52  8/19/2016        Mild intermittent asthma without complication [id number]: R25.18  ICD-9-CM: 493.90  8/19/2016        Fibroids ICD-10-CM: D21.9  ICD-9-CM: 215.9  3/23/2016              The clinicians listed above have asked me to see Corrie Grossman in consultation regarding suspected   fatty liver disease and its management. All medical records sent by the referring physicians were reviewed including imaging studies      The patient is a 38 y.o.  female who is suspected to have fatty liver disease based upon  ultrasound. The most recent laboratory studies indicate that the liver transaminases are normal,     Serologic evaluation for markers of chronic liver disease has either not been performed or the results are not available to me. Imaging of the liver performed in 8/2019 demonstrated severe hepatic steatosis. An assessment of liver fibrosis with biopsy or elastography has not been performed. The patient had not started any new medications within 3 months preceding the elevation in liver chemistries. The patient notes fatigue, occasional pain in the right side over the liver,     The patient has not experienced the following symptoms: yellowing of the eyes or skin, problems concentrating, swelling of the abdomen, swelling of the lower extremities,     The patient completes all daily activities without any functional limitations. ASSESSMENT AND PLAN:  Fatty liver  Suspect the patient has fatty liver based upon imaging, features of metabolic syndrome, serologic studies that are negative for other causes of chronic liver disease,   The histologic severity has not been defined.       Liver transaminases are normal.  ALP is normal.  Liver function is normal. The platelet count is normal.      Based upon laboratory studies and imaging the patient does not appear to have significant liver injury. Have performed laboratory testing to monitor liver function and degree of liver injury. This included BMP, hepatic panel, CBC with platelet count, INR. Serologic testing for causes of chronic liver disease were ordered. All were negative     The need to perform an assessment of liver fibrosis was discussed with the patient. The Fibroscan can assess liver fibrosis and determine if a patient has advanced fibrosis or cirrhosis without the need for liver biopsy. This will be performed at the next office visit. If the Fibroscan suggests advanced fibrosis then a liver biopsy should be considered. The Fibroscan can be repeated annually or as often as clinically indicated to assess for fibrosis progression and/or regression. Counseling for diet and weight loss in patients with confirmed or suspected NAFLD  There is currently no FDA approved medical treatment for fatty liver, NALFD or DIAZ. The patient was counseled regarding diet and exercise to achieve weight loss. The best diet for patients with fatty liver is one very low in carbohydrates and enriched with protein such as an Vi's program.      The patient was told not to consume any food products and drinks containing fructose as this enhances hepatic fat synthesis. There is no medication or vitamin supplements that we advocate for DIAZ. Using glitazones in patients without diabetes mellitus has been shown to reduce fat content in the liver but has no effect on fibrosis and is associated with weight gain. Vitamin E has also been used but the data is not very good and most experts no longer advocate this. The only medical treatments for DIAZ are though clinical trials. The patient would be a good candidate for enrollment into a clinical trial if found to have DIAZ.     Screening for Hepatocellular Carcinoma  HCC screening is not necessary if the patient has no evidence of cirrhosis. Treatment of other medical problems in patients with chronic liver disease  There are no contraindications for the patient to take most medications that are necessary for treatment of other medical issues. Counseling for alcohol in patients with chronic liver disease  The patient was counseled regarding alcohol consumption and the effect of alcohol on chronic liver disease. The patient does not consume any significant amount of alcohol. Vaccinations   Vaccination for viral hepatitis A is recommended since the patient has no serologic evidence of previous exposure or vaccination with immunity. Vaccination for viral hepatitis B is not needed. The patient has serologic evidence of prior exposure or vaccination with immunity. Routine vaccinations against other bacterial and viral agents can be performed as indicated. Annual flu vaccination should be administered if indicated. ALLERGIES  Allergies   Allergen Reactions    Latex Rash     cellulitis    Sulfa (Sulfonamide Antibiotics) Anaphylaxis    Adhesive Tape-Silicones Rash    Morphine Rash    Neosporin [Benzalkonium Chloride] Rash       MEDICATIONS  Current Outpatient Medications   Medication Sig    sertraline (ZOLOFT) 100 mg tablet TAKE 2 TABLETS BY MOUTH EVERY DAY    LORazepam (ATIVAN) 1 mg tablet Take 1 Tab by mouth two (2) times a day. Max Daily Amount: 2 mg. Prn anxiety  Indications: anxious    atorvastatin (LIPITOR) 20 mg tablet TAKE 1 TABLET BY MOUTH EVERY DAY    aspirin delayed-release 81 mg tablet Take  by mouth daily.  CETIRIZINE HCL (ZYRTEC PO) Take  by mouth as needed.  ondansetron (ZOFRAN ODT) 4 mg disintegrating tablet Take 1 Tab by mouth every eight (8) hours as needed for Nausea.  naproxen (NAPROSYN) 500 mg tablet Take 1 Tab by mouth every twelve (12) hours as needed for Pain.      No current facility-administered medications for this visit. SYSTEM REVIEW NOT RELATED TO LIVER DISEASE OR REVIEWED ABOVE:  Constitution systems: Negative for fever, chills, weight gain, weight loss. Eyes: Negative for visual changes. ENT: Negative for sore throat, painful swallowing. Respiratory: Negative for cough, hemoptysis, SOB. Cardiology: Negative for chest pain, palpitations. GI:  Negative for constipation or diarrhea. : Negative for urinary frequency, dysuria, hematuria, nocturia. Skin: Negative for rash. Hematology: Negative for easy bruising, blood clots. Musculo-skelatal: Negative for back pain, muscle pain, weakness. Neurologic: Negative for headaches, dizziness, vertigo, memory problems not related to HE. Psychology: Negative for anxiety, depression. FAMILY HISTORY:  The father    Has/had the following following chronic disease(s): HCV, CAD, Dyslipidemia  The patient has no knowledge of the mother's medical condition. There is no family history of liver disease. The following family members have liver disease: There is no family history of immune disorders. The following family members have immune disorders: Aunt, Cousin Autoimmune    SOCIAL HISTORY:  The patient is . The patient has 1 child. The patient smokes marijuana. The patient consumes alcohol on social occasions never in excess. The patient works as a teacher. PHYSICAL EXAMINATION:  VS: per nursing note  General: No acute distress. Eyes: Sclera anicteric. ENT: No oral lesions. Thyroid normal.  Nodes: No adenopathy. Skin: No spider angiomata. No jaundice. No palmar erythema. Respiratory: Lungs clear to auscultation. Cardiovascular: Regular heart rate. No murmurs. No JVD. Abdomen: Soft non-tender, liver size normal to percussion/palpation. Spleen not palpable. No obvious ascites. Extremities: No edema. No muscle wasting. No gross arthritic changes. Neurologic: Alert and oriented.   Cranial nerves grossly intact. No asterixis. LABORATORY STUDIES:  Liver [facility] of [address] [place] [address] & Units 8/27/2019 8/10/2019   HGB 11.5 - 16.0 g/dL  15.6    - 400 K/uL  228   INR 0.8 - 1.2 0.9    AST 0 - 40 IU/L 25 21   ALT 0 - 32 IU/L 28 43   Alk Phos 39 - 117 IU/L 69 85   Bili, Total 0.0 - 1.2 mg/dL 0.8 0.9   Bili, Direct 0.00 - 0.40 mg/dL 0.19    Albumin 3.5 - 5.5 g/dL 4.9 4.1   BUN 6 - 24 mg/dL 10 9   Creat 0.57 - 1.00 mg/dL 0.63 0.80   Na 134 - 144 mmol/L 139 139   K 3.5 - 5.2 mmol/L 4.2 4.0   Cl 96 - 106 mmol/L 101 109 (H)   CO2 20 - 29 mmol/L 23 24   Glucose 65 - 99 mg/dL 87 107 (H)     SEROLOGIES:  Serologies Latest Ref Rng & Units 8/27/2019   Hep A Ab, Total Negative Negative   Hep B Surface Ag Negative Negative   Hep B Core Ab, Total Negative Negative   Hep B Surface AB QL  Reactive   Hep C Ab 0.0 - 0.9 s/co ratio <0.1   Ferritin 15 - 150 ng/mL 206 (H)   Iron % Saturation 15 - 55 % 21   MAURICIO, IFA  Negative   ASMCA 0 - 19 Units 6   Ceruloplasmin 19.0 - 39.0 mg/dL 27.2   Alpha-1 antitrypsin level 90 - 200 mg/dL 131     LIVER HISTOLOGY:  Not available or performed    ENDOSCOPIC PROCEDURES:  Not available or performed    RADIOLOGY:  8/2019. Ultrasound of liver. Echogenic consistent with fatty liver. No liver mass lesions. No dilated bile ducts. No ascites. OTHER TESTING:  Not available or performed    FOLLOW-UP:  All of the issues listed above in the Assessment and Plan were discussed with the patient. All questions were answered. The patient expressed a clear understanding of the above. [facility] 87 in 4 weeks for Fibroscan to review all data and determine the treatment plan. Sylvan Claude, MD, MPH  [place] [address] [address], [address] [facility] 22.  [phone]  Fiona Lantigua MD  I have personally interviewed and examined the patient during the encounter.   I have explained the key findings related to the liver disease and other pertinent diagnoses as noted above to the patient and answered all questions. I have reviewed and agree with the findings documented above, including all diagnostic interpretations, and plans. I have edited the original note as appropriate for clarity.     MD Kai Kennedy[address] 22.  [phone]  [address] [place] [address]

## 2019-08-27 NOTE — PROGRESS NOTES
Chief Complaint   Patient presents with    New Patient     Follow up from ED [facility]     Visit Vitals  /66 (BP 1 Location: Left arm, BP Patient Position: Sitting)   Pulse 75   Temp 97.4 °F (36.3 °C) (Tympanic)   Ht 5' 6\" (1.676 m)   Wt 189 lb 9.6 oz (86 kg)   SpO2 97%   BMI 30.60 kg/m²     3 most recent PHQ Screens 5/29/2019   Little interest or pleasure in doing things Several days   Feeling down, depressed, irritable, or hopeless Several days   Total Score PHQ 2 2   Trouble falling or staying asleep, or sleeping too much -   Feeling tired or having little energy -   Poor appetite, weight loss, or overeating -   Feeling bad about yourself - or that you are a failure or have let yourself or your family down -   Trouble concentrating on things such as school, work, reading, or watching TV -   Moving or speaking so slowly that other people could have noticed; or the opposite being so fidgety that others notice -   Thoughts of being better off dead, or hurting yourself in some way -   PHQ 9 Score -   How difficult have these problems made it for you to do your work, take care of your home and get along with others -     Abuse Screening Questionnaire 8/27/2019   Do you ever feel afraid of your partner? N   Are you in a relationship with someone who physically or mentally threatens you? N   Is it safe for you to go home?  Y     Learning Assessment 8/27/2019   PRIMARY LEARNER Patient   HIGHEST LEVEL OF EDUCATION - PRIMARY LEARNER  4 YEARS OF COLLEGE   BARRIERS PRIMARY LEARNER NONE   PRIMARY LANGUAGE ENGLISH   LEARNER PREFERENCE PRIMARY LISTENING   ANSWERED BY patient    RELATIONSHIP SELF   ASSESSMENT COMMENT -

## 2019-08-28 LAB
A1AT SERPL-MCNC: 131 MG/DL (ref 90–200)
ACTIN IGG SERPL-ACNC: 6 UNITS (ref 0–19)
ALBUMIN SERPL-MCNC: 4.9 G/DL (ref 3.5–5.5)
ALP SERPL-CCNC: 69 IU/L (ref 39–117)
ALT SERPL-CCNC: 28 IU/L (ref 0–32)
ANA TITR SER IF: NEGATIVE {TITER}
AST SERPL-CCNC: 25 IU/L (ref 0–40)
BILIRUB DIRECT SERPL-MCNC: 0.19 MG/DL (ref 0–0.4)
BILIRUB SERPL-MCNC: 0.8 MG/DL (ref 0–1.2)
BUN SERPL-MCNC: 10 MG/DL (ref 6–24)
BUN/CREAT SERPL: 16 (ref 9–23)
CALCIUM SERPL-MCNC: 9.7 MG/DL (ref 8.7–10.2)
CERULOPLASMIN SERPL-MCNC: 27.2 MG/DL (ref 19–39)
CHLORIDE SERPL-SCNC: 101 MMOL/L (ref 96–106)
CO2 SERPL-SCNC: 23 MMOL/L (ref 20–29)
COMMENT, 144067: NORMAL
CREAT SERPL-MCNC: 0.63 MG/DL (ref 0.57–1)
FERRITIN SERPL-MCNC: 206 NG/ML (ref 15–150)
GLUCOSE SERPL-MCNC: 87 MG/DL (ref 65–99)
HAV AB SER QL IA: NEGATIVE
HBV CORE AB SERPL QL IA: NEGATIVE
HBV SURFACE AB SER QL: REACTIVE
HBV SURFACE AG SERPL QL IA: NEGATIVE
HCV AB S/CO SERPL IA: <0.1 S/CO RATIO (ref 0–0.9)
INR PPP: 0.9 (ref 0.8–1.2)
IRON SATN MFR SERPL: 21 % (ref 15–55)
IRON SERPL-MCNC: 73 UG/DL (ref 27–159)
POTASSIUM SERPL-SCNC: 4.2 MMOL/L (ref 3.5–5.2)
PROT SERPL-MCNC: 7.6 G/DL (ref 6–8.5)
PROTHROMBIN TIME: 9.9 SEC (ref 9.1–12)
SODIUM SERPL-SCNC: 139 MMOL/L (ref 134–144)
TIBC SERPL-MCNC: 352 UG/DL (ref 250–450)
UIBC SERPL-MCNC: 279 UG/DL (ref 131–425)

## 2019-09-05 ENCOUNTER — HOSPITAL ENCOUNTER (OUTPATIENT)
Dept: MAMMOGRAPHY | Age: 44
Discharge: HOME | End: 2019-09-05
Attending: NURSE PRACTITIONER
Payer: COMMERCIAL

## 2019-09-05 DIAGNOSIS — Z12.31 SCREENING MAMMOGRAM, ENCOUNTER FOR: ICD-10-CM

## 2019-09-05 PROCEDURE — 77063 BREAST TOMOSYNTHESIS BI: CPT

## 2019-09-15 PROBLEM — E80.6 HYPERBILIRUBINEMIA: Status: RESOLVED | Noted: 2017-09-05 | Resolved: 2019-09-15

## 2019-09-15 PROBLEM — R74.01 ELEVATED ALT MEASUREMENT: Status: RESOLVED | Noted: 2017-09-05 | Resolved: 2019-09-15

## 2019-09-15 PROBLEM — K76.0 FATTY LIVER: Status: ACTIVE | Noted: 2019-09-15

## 2019-10-08 ENCOUNTER — OFFICE VISIT (OUTPATIENT)
Dept: INTERNAL MEDICINE CLINIC | Age: 44
End: 2019-10-08

## 2019-10-08 VITALS
BODY MASS INDEX: 30.57 KG/M2 | RESPIRATION RATE: 16 BRPM | SYSTOLIC BLOOD PRESSURE: 100 MMHG | HEIGHT: 66 IN | DIASTOLIC BLOOD PRESSURE: 76 MMHG | OXYGEN SATURATION: 96 % | TEMPERATURE: 98.3 F | WEIGHT: 190.2 LBS | HEART RATE: 76 BPM

## 2019-10-08 DIAGNOSIS — M25.50 ARTHRALGIA, UNSPECIFIED JOINT: ICD-10-CM

## 2019-10-08 DIAGNOSIS — E03.9 ACQUIRED HYPOTHYROIDISM: ICD-10-CM

## 2019-10-08 DIAGNOSIS — F41.9 ANXIETY: ICD-10-CM

## 2019-10-08 DIAGNOSIS — M19.049 HAND ARTHRITIS: ICD-10-CM

## 2019-10-08 DIAGNOSIS — L73.9 FOLLICULITIS: ICD-10-CM

## 2019-10-08 DIAGNOSIS — R19.7 DIARRHEA, UNSPECIFIED TYPE: Primary | ICD-10-CM

## 2019-10-08 RX ORDER — LEVOTHYROXINE SODIUM 50 UG/1
50 TABLET ORAL DAILY
Refills: 6 | COMMUNITY
Start: 2019-09-26 | End: 2020-07-21

## 2019-10-08 RX ORDER — MUPIROCIN 20 MG/G
OINTMENT TOPICAL DAILY
Qty: 22 G | Refills: 0 | Status: SHIPPED | OUTPATIENT
Start: 2019-10-08 | End: 2020-07-21

## 2019-10-08 RX ORDER — DICLOFENAC SODIUM 10 MG/G
2 GEL TOPICAL 4 TIMES DAILY
Qty: 100 G | Refills: 2 | Status: SHIPPED | OUTPATIENT
Start: 2019-10-08 | End: 2022-07-18 | Stop reason: ALTCHOICE

## 2019-10-08 RX ORDER — LORAZEPAM 1 MG/1
1 TABLET ORAL 2 TIMES DAILY
Qty: 30 TAB | Refills: 1 | Status: SHIPPED | OUTPATIENT
Start: 2019-10-08 | End: 2020-04-27 | Stop reason: SDUPTHER

## 2019-10-08 RX ORDER — SERTRALINE HYDROCHLORIDE 100 MG/1
200 TABLET, FILM COATED ORAL
COMMUNITY
Start: 2019-03-21 | End: 2019-10-08 | Stop reason: SDUPTHER

## 2019-10-08 NOTE — PROGRESS NOTES
Subjective:      Alicia Luna is a 38 y.o. female who presents today for follow up. Endocrine Review  She was recently started on levothyroxine 50 mcg for 8 days. She does not note any problems taking this medication, no side effects. Endocrine follow up is scheduled for a few months out. GI Review  She continues to follow with Dr. Joe Dickson for fatty liver. She had a colonoscopy over the summer that was normal. She has follow up scheduled for October 18th for fibroscan. Chronic diarrhea: She notes symptoms come in cycles of 4-6 weeks of \"neon yellow\" liquid stools that are intermittent with diarrhea that is not yellow. She notes low grade fevers and excessive sweating that started over the past year. She has previously seen GI who were unable to explain symptoms. She has been referred to infectious disease for the chronic low grade fevers by endocrinology. She will schedule an appointment. She has not previously had her yellow stools tested. Skin: she notes non healing lesions to the back of her legs and rectum for 6-8 months. She stopped shaving and this helped. She then used lotion and this did not help. She then tried oxy pads and this helped. Hand pain: she suffers from arthritis, she is open to starting exercises for this. She states the voltaren gel helps. Mental Health Review  Patient is seen for anxiety disorder. Since last visit: no changes, she has been using the ativan a few times a week, she tries to use this sparingly. Ongoing symptoms include: racing thoughts, feelings of losing control. She denies: SI/SA. Reported side effects from the treatment: none.         Patient Active Problem List    Diagnosis Date Noted    Fatty liver 09/15/2019    Obese 08/27/2019    Hypercalcemia 06/19/2019    Abnormal mammogram 12/19/2017    PTSD (post-traumatic stress disorder) 11/29/2017    Irritable bowel syndrome with diarrhea 11/29/2017    Elevated BP without diagnosis of hypertension 09/18/2017    Arthralgia 08/19/2016    Hyperlipidemia 08/19/2016    Vitamin D deficiency 08/19/2016    Anxiety 08/19/2016    DDD (degenerative disc disease), lumbar 08/19/2016    Mild intermittent asthma without complication 87/27/1935    Fibroids 03/23/2016     Current Outpatient Medications   Medication Sig Dispense Refill    ondansetron (ZOFRAN ODT) 4 mg disintegrating tablet Take 1 Tab by mouth every eight (8) hours as needed for Nausea. 10 Tab 0    naproxen (NAPROSYN) 500 mg tablet Take 1 Tab by mouth every twelve (12) hours as needed for Pain. 20 Tab 0    sertraline (ZOLOFT) 100 mg tablet TAKE 2 TABLETS BY MOUTH EVERY  Tab 2    LORazepam (ATIVAN) 1 mg tablet Take 1 Tab by mouth two (2) times a day. Max Daily Amount: 2 mg. Prn anxiety  Indications: anxious 30 Tab 0    atorvastatin (LIPITOR) 20 mg tablet TAKE 1 TABLET BY MOUTH EVERY DAY 90 Tab 3    aspirin delayed-release 81 mg tablet Take  by mouth daily.  CETIRIZINE HCL (ZYRTEC PO) Take  by mouth as needed. Allergies   Allergen Reactions    Latex Rash     cellulitis    Sulfa (Sulfonamide Antibiotics) Anaphylaxis    Adhesive Tape-Silicones Rash    Morphine Rash    Neosporin [Benzalkonium Chloride] Rash     Past Medical History:   Diagnosis Date    Adverse effect of anesthesia     hard to wake up    Anxiety     Arthritis     back    Asperger syndrome     Asthma     Depression     Hypercholesterolemia     IBD (inflammatory bowel disease)     Ill-defined condition     discomfort hips shoulders knees and feet and hands MAURICIO was positive    Irritable bowel disease 1995    Neurological disorder     Psychiatric disorder     anxiety and depression     Past Surgical History:   Procedure Laterality Date    HX HYSTERECTOMY  4/14/16    da Dotty Robotic Total Laparoscopic Hysterectomy with bilateral  salpingectomy more than 250 grams. Cystoscopy.     HX ORTHOPAEDIC      Spinal fusion, back surgery,rods and screws  HX ORTHOPAEDIC      achilles lenghtening    HX ORTHOPAEDIC      laminectomy    HX OTHER SURGICAL      tumor removed from nose as child    HX OTHER SURGICAL      dental extraction        Review of Systems    A comprehensive review of systems was negative except for that written in the HPI. Objective:     Visit Vitals  /76 (BP 1 Location: Right arm, BP Patient Position: Sitting)   Pulse 76   Temp 98.3 °F (36.8 °C) (Oral)   Resp 16   Ht 5' 6\" (1.676 m)   Wt 190 lb 3.2 oz (86.3 kg)   LMP 03/18/2016   SpO2 96%   BMI 30.70 kg/m²     General appearance: alert, cooperative, no distress, appears stated age  Head: Normocephalic, without obvious abnormality, atraumatic  Eyes: negative  Lungs: clear to auscultation bilaterally  Heart: regular rate and rhythm, S1, S2 normal, no murmur, click, rub or gallop  Extremities: extremities normal, atraumatic, no cyanosis or edema  Pulses: 2+ and symmetric  Skin: folliculitis noted to posterior thighs  Neurologic: Alert and oriented X 3, normal strength and tone. Normal coordination and gait  Psych: appropriate mood, speech, affect    Nursing note and vitals reviewed  Assessment/Plan:       ICD-10-CM ICD-9-CM    1. Diarrhea, unspecified type R19.7 787.91 REFERRAL TO GASTROENTEROLOGY      WBC, STOOL      OVA & PARASITES, STOOL      CULTURE, STOOL   2. Anxiety F41.9 300.00 LORazepam (ATIVAN) 1 mg tablet   3. Folliculitis Y61.4 572.7 mupirocin (BACTROBAN) 2 % ointment   4. Hand arthritis M19.049 716.94 diclofenac (VOLTAREN) 1 % gel   5. Arthralgia, unspecified joint M25.50 719.40    6. Acquired hypothyroidism E03.9 244.9              Advised her to call back or return to office if symptoms worsen/change/persist.  Discussed expected course/resolution/complications of diagnosis in detail with patient. Medication risks/benefits/costs/interactions/alternatives discussed with patient.   She was given an after visit summary which includes diagnoses, current medications, & vitals. She expressed understanding with the diagnosis and plan.

## 2019-10-08 NOTE — PROGRESS NOTES
Identified pt with two pt identifiers(name and ). Reviewed record in preparation for visit and have obtained necessary documentation. Chief Complaint   Patient presents with    Medication Evaluation        Health Maintenance Due   Topic    PAP AKA CERVICAL CYTOLOGY     Influenza Age 5 to Adult        Coordination of Care Questionnaire:  :   1) Have you been to an emergency room, urgent care, or hospitalized since your last visit? If yes, where when, and reason for visit? yes [address] 8/10/19 for Liver problem    2. Have seen or consulted any other health care provider other than [facility] since your last visit? If yes, where when, and reason for visit? NO    3) Do you have an Advanced Directive/ Living Will in place? NO  If yes, do we have a copy on file NO  If no, would you like information NO    Patient is accompanied by  I have received verbal consent from Ivis Srinivasan to discuss any/all medical information while they are present in the room.     Visit Vitals  /76 (BP 1 Location: Right arm, BP Patient Position: Sitting)   Pulse 76   Temp 98.3 °F (36.8 °C) (Oral)   Resp 16   Ht 5' 6\" (1.676 m)   Wt 190 lb 3.2 oz (86.3 kg)   SpO2 96%   BMI 30.70 kg/m²     Kimberly Min, LPN

## 2019-10-08 NOTE — PATIENT INSTRUCTIONS
1. Schedule infectious disease apt  2. Schedule follow up with Dr. Ann Maynard (GI)  3. Use stool test kit for neon yellow stools  4. Start mupiricin for folliculitis on spots that are problematic or become infected. Hand Arthritis: Exercises  Introduction  Here are some examples of exercises for you to try. The exercises may be suggested for a condition or for rehabilitation. Start each exercise slowly. Ease off the exercises if you start to have pain. You will be told when to start these exercises and which ones will work best for you. How to do the exercises  Tendon tani    1. In this exercise, the steps follow one another to a make a continuous movement. 2. With your affected hand, point your fingers and thumb straight up. Your wrist should be relaxed, following the line of your fingers and thumb. 3. Curl your fingers so that the top two joints in them are bent, and your fingers wrap down. Your fingertips should touch or be near the base of your fingers. Your fingers will look like a hook. 4. Make a fist by bending your knuckles. Your thumb can gently rest against your index (pointing) finger. 5. Unwind your fingers slightly so that your fingertips can touch the base of your palm. Your thumb can rest against your index finger. 6. Move back to your starting position, with your fingers and thumb pointing up. 7. Repeat the series of motions 8 to 12 times. 8. Switch hands and repeat steps 1 through 6, even if only one hand is sore. Intrinsic flexion    1. Rest your affected hand on a table and bend the large joints where your fingers connect to your hand. Keep your thumb and the other joints in your fingers straight. 2. Slowly straighten your fingers. Your wrist should be relaxed, following the line of your fingers and thumb. 3. Move back to your starting position, with your hand bent. 4. Repeat 8 to 12 times.   5. Switch hands and repeat steps 1 through 4, even if only one hand is sore.    Finger extension    1. Place your affected hand flat on a table. 2. Lift and then lower one finger at a time off the table. 3. Repeat 8 to 12 times. 4. Switch hands and repeat steps 1 through 3, even if only one hand is sore. MP extension    1. Place your good hand on a table, palm up. Put your affected hand on top of your good hand with your fingers wrapped around the thumb of your good hand like you are making a fist.  2. Slowly uncurl the joints of your affected hand where your fingers connect to your hand so that only the top two joints of your fingers are bent. Your fingers will look like a hook. 3. Move back to your starting position, with your fingers wrapped around your good thumb. 4. Repeat 8 to 12 times. 5. Switch hands and repeat steps 1 through 4, even if only one hand is sore. PIP extension (with MP extension)    1. Place your good hand on a table, palm up. Put your affected hand on top of your good hand, palm up. 2. Use the thumb and fingers of your good hand to grasp below the middle joint of one finger of your affected hand. 3. Straighten the last two joints of that finger. 4. Repeat 8 to 12 times. 5. Repeat steps 1 through 4 with each finger. 6. Switch hands and repeat steps 1 through 5, even if only one hand is sore. DIP flexion    1. With your good hand, grasp one finger of your affected hand. Your thumb will be on the top side of your finger just below the joint that is closest to your fingernail. 2. Slowly bend your affected finger only at the joint closest to your fingernail. 3. Repeat 8 to 12 times. 4. Repeat steps 1 through 3 with each finger. 5. Switch hands and repeat steps 1 through 4, even if only one hand is sore. Follow-up care is a key part of your treatment and safety. Be sure to make and go to all appointments, and call your doctor if you are having problems.  It's also a good idea to know your test results and keep a list of the medicines you take.  Where can you learn more? Go to http://demarco-max.info/. Enter I487 in the search box to learn more about \"Hand Arthritis: Exercises. \"  Current as of: June 26, 2019  Content Version: 12.2  © 2580-2092 [employer]. Care instructions adapted under license by [employer] (which disclaims liability or warranty for this information). If you have questions about a medical condition or this instruction, always ask your healthcare professional. [address] any warranty or liability for your use of this information. Folliculitis: Care Instructions  Your Care Instructions    Folliculitis (say \"gyn-KUN-tiz-LY-tus\") is an infection of the pouches (follicles) in the skin where hair grows. It can occur on any part of the body, but it is most common on the scalp, face, armpits, and groin. Bacteria, such as those found in a hot tub, can cause folliculitis. Folliculitis begins as a red, tender area near a strand of hair. The skin can itch or burn and may drain pus or blood. Sometimes folliculitis can lead to more serious skin infections. Your doctor usually can treat mild folliculitis with an antibiotic cream or ointment. If you have folliculitis on your scalp, you may use a shampoo that kills bacteria. Antibiotics you take as pills can treat infections deeper in the skin. For stubborn cases of folliculitis, laser treatment may be an option. Laser treatment uses strong beams of light to destroy the hair follicle. But hair will no longer grow in the treated area. Follow-up care is a key part of your treatment and safety. Be sure to make and go to all appointments, and call your doctor if you are having problems. It's also a good idea to know your test results and keep a list of the medicines you take. How can you care for yourself at home? · Take your medicine exactly as prescribed. If your doctor prescribed antibiotics, take them as directed. Do not stop taking them just because you feel better. You need to take the full course of antibiotics. · Use a soap that kills bacteria to wash the infected area. If your scalp or beard is infected, use a shampoo with selenium or propylene glycol. Be careful. Do not scrub too long or too hard. · Mix 1 1/3 cup warm water and 1 tablespoon vinegar. Soak a cloth in the mixture, and place it over the infected skin until it cools off (usually 5 to 10 minutes). You can do this 3 to 6 times a day. · Do not share your razor, towel, or washcloth. That can spread folliculitis. · Use a new blade in your razor each time you shave to keep from re-infecting your skin. · If you tend to get folliculitis, avoid using hot tubs. They can contain bacteria that cause folliculitis. When should you call for help? Call your doctor now or seek immediate medical care if:    · You have symptoms of infection, such as:  ? Increased pain, swelling, warmth, or redness. ? Red streaks leading from the area. ? Pus draining from the area. ? A fever.    Watch closely for changes in your health, and be sure to contact your doctor if:    · You do not get better as expected. Where can you learn more? Go to http://demarco-max.info/. Enter M257 in the search box to learn more about \"Folliculitis: Care Instructions. \"  Current as of: April 1, 2019  Content Version: 12.2  © 2985-3015 [employer]. Care instructions adapted under license by [employer] (which disclaims liability or warranty for this information). If you have questions about a medical condition or this instruction, always ask your healthcare professional. [address] any warranty or liability for your use of this information.

## 2019-10-18 ENCOUNTER — OFFICE VISIT (OUTPATIENT)
Dept: HEMATOLOGY | Age: 44
End: 2019-10-18

## 2019-10-18 VITALS
SYSTOLIC BLOOD PRESSURE: 130 MMHG | BODY MASS INDEX: 28.09 KG/M2 | TEMPERATURE: 97.3 F | RESPIRATION RATE: 16 BRPM | DIASTOLIC BLOOD PRESSURE: 93 MMHG | OXYGEN SATURATION: 98 % | HEIGHT: 69 IN | HEART RATE: 100 BPM

## 2019-10-18 DIAGNOSIS — K76.0 NAFL (NONALCOHOLIC FATTY LIVER): Primary | ICD-10-CM

## 2019-10-18 NOTE — PROGRESS NOTES
[address] [place] [address], Brandyn MUÑOZ Ninette Ali, MD Joneen Cheers, PA-C Ervin Meadows, [facility]     April S Alexis, [facility]   JAVIER Waldrop-MATI Hansen, [facility]       Lane Hillman [address]    at [address], [address] [place] [address], [facility] 22.    [phone]    FAX: [address] [place] [address]    at [address], [address], [address]    [phone]    FAX: [phone]       Patient Care Team:  Skiff, Noni Car, NP as PCP - General (Nurse Practitioner)  Riley Maddox MD (Gynecology)      Problem List  Date Reviewed: 10/8/2019          Codes Class Noted    NAFL (nonalcoholic fatty liver) PLB-56-DD: K76.0  ICD-9-CM: 571.8  10/18/2019        Acquired hypothyroidism ICD-10-CM: E03.9  ICD-9-CM: 244.9  10/8/2019        Obese ICD-10-CM: E66.9  ICD-9-CM: 278.00  8/27/2019        Hypercalcemia ICD-10-CM: E83.52  ICD-9-CM: 275.42  6/19/2019        Abnormal mammogram ICD-10-CM: R92.8  ICD-9-CM: 793.80  12/19/2017    Overview Signed 12/19/2017  9:43 AM by Brennan Gama NP     Mammo results 12/19/17:  IMPRESSION: Decreased conspicuity of the left breast asymmetry. No evidence for  malignancy in the left breast.    Recommendation:  Bilateral screening mammography in June 2018.     BI-RADS Assessment Category 2: Benign finding             PTSD (post-traumatic stress disorder) ICD-10-CM: F43.10  ICD-9-CM: 309.81  11/29/2017    Overview Signed 11/29/2017  9:14 AM by Brennan Gama NP     She has been diagnosed with complex PTSD             Irritable bowel syndrome with diarrhea ICD-10-CM: K58.0  ICD-9-CM: 564.1  11/29/2017        Elevated BP without diagnosis of hypertension ICD-10-CM: R03.0  ICD-9-CM: 796.2  9/18/2017        Arthralgia ICD-10-CM: M25.50  ICD-9-CM: 719.40  8/19/2016        Hyperlipidemia ICD-10-CM: E78.5  ICD-9-CM: 272.4  8/19/2016        Vitamin D deficiency ICD-10-CM: E55.9  ICD-9-CM: 268.9  8/19/2016        Anxiety ICD-10-CM: F41.9  ICD-9-CM: 300.00  8/19/2016        DDD (degenerative disc disease), lumbar ICD-10-CM: M51.36  ICD-9-CM: 722.52  8/19/2016        Mild intermittent asthma without complication DFE-67-HR: Z59.27  ICD-9-CM: 493.90  8/19/2016        Fibroids ICD-10-CM: D21.9  ICD-9-CM: 215.9  3/23/2016              Bradley Christy [facility] to the [facility] for management of fatty liver. The active problem list, all pertinent past medical history, medications,   radiologic findings and laboratory findings related to the liver disorder were reviewed with the patient. The patient is a 38 y.o.  female who is suspected to have fatty liver disease based upon ultrasound. Serologic evaluation for markers of chronic liver disease were negative. Imaging of the liver performed in 8/2019 demonstrated severe hepatic steatosis. Assessment of liver fibrosis was performed in the office today with Fibroscan. The result was 4.1 kPa which correlates with no fibrosis. The CAP score of 339 suggests fatty liver. The patient notes fatigue, and occasional pain in the right side over the liver. She has intermittent episodes of loose stools, and low grade fevers. The patient has not experienced the following symptoms: yellowing of the eyes or skin, problems concentrating, swelling of the abdomen, or swelling of the lower extremities. The patient completes all daily activities without any functional limitations. ASSESSMENT AND PLAN:  Benign steatosis/NAFL  The diagnosis is based upon imaging, Fiboscan CAP score, features of metabolic syndrome, and serologic studies that are negative for other causes of chronic liver disease. Elastography performed in 10/2019 suggests no fibrosis. NAFL is a benign form of fatty liver disease and not thought to progress to fibrosis or cirrhosis. Liver transaminases are normal.  ALP is normal.  Liver function is normal.  The platelet count is normal.      Based upon laboratory studies, Fibroscan, and imaging  the patient does not appear to have significant liver injury. Have performed laboratory testing to monitor liver function and degree of liver injury. This included BMP, hepatic panel, CBC with platelet count, and INR. Only a liver biopsy can confirm if the patient does have fatty liver and differentiate NAFL from DIAZ. The treatment is the same; weight reduction. There is no fibrosis, will defer biopsy for now. If the patient looses 10% of current body weight, which is 19 pounds, down to a weight of of 171 pounds, all steatosis will have resolved. Once all steatosis has resolved all inflammation will resolve. There is currently no FDA approved medical treatment for fatty liver, NALFD or DIAZ. The patient will be monitored at periodic intervals. If weight loss is successful hepatic steatosis will resolve and liver enzymes should return to the normal range. The Fibroscan can be repeated annually or as often as clinically indicated to assess for progression or regression of fibrosis. Counseling for diet and weight loss in patients with confirmed or suspected NAFLD  The patient was counseled regarding diet and exercise to achieve weight loss. The best diet for patients with fatty liver is one very low in carbohydrates and enriched with protein such as an Vi's program.      The patient was told not to consume any food products and drinks containing fructose as this enhances hepatic fat synthesis. Screening for Hepatocellular Carcinoma  HCC screening is not necessary if the patient has no evidence of cirrhosis.     Treatment of other medical problems in patients with chronic liver disease  There are no contraindications for the patient to take most medications that are necessary for treatment of other medical issues. Counseling for alcohol in patients with chronic liver disease  The patient was counseled regarding alcohol consumption and the effect of alcohol on chronic liver disease. The patient does not consume any significant amount of alcohol. Vaccinations   Vaccination for viral hepatitis A is recommended since the patient has no serologic evidence of previous exposure or vaccination with immunity. Vaccination for viral hepatitis B is not needed. The patient has serologic evidence of prior exposure or vaccination with immunity. Routine vaccinations against other bacterial and viral agents can be performed as indicated. Annual flu vaccination should be administered if indicated. ALLERGIES  Allergies   Allergen Reactions    Latex Rash     cellulitis    Sulfa (Sulfonamide Antibiotics) Anaphylaxis    Adhesive Tape-Silicones Rash    Morphine Rash    Neosporin [Benzalkonium Chloride] Rash       MEDICATIONS  Current Outpatient Medications   Medication Sig    SYNTHROID 50 mcg tablet Take 50 mcg by mouth daily.  LORazepam (ATIVAN) 1 mg tablet Take 1 Tab by mouth two (2) times a day. Max Daily Amount: 2 mg. Prn anxiety  Indications: anxious    mupirocin (BACTROBAN) 2 % ointment Apply  to affected area daily.  diclofenac (VOLTAREN) 1 % gel Apply 2 g to affected area four (4) times daily.  sertraline (ZOLOFT) 100 mg tablet TAKE 2 TABLETS BY MOUTH EVERY DAY    atorvastatin (LIPITOR) 20 mg tablet TAKE 1 TABLET BY MOUTH EVERY DAY    aspirin delayed-release 81 mg tablet Take  by mouth daily.  CETIRIZINE HCL (ZYRTEC PO) Take  by mouth as needed. No current facility-administered medications for this visit. SYSTEM REVIEW NOT RELATED TO LIVER DISEASE OR REVIEWED ABOVE:  Constitution systems: Negative for fever, chills, weight gain, weight loss.    Eyes: Negative for visual changes. ENT: Negative for sore throat, painful swallowing. Respiratory: Negative for cough, hemoptysis, SOB. Cardiology: Negative for chest pain, palpitations. GI:  Negative for constipation or diarrhea. : Negative for urinary frequency, dysuria, hematuria, nocturia. Skin: Negative for rash. Hematology: Negative for easy bruising, blood clots. Musculo-skelatal: Negative for back pain, muscle pain, weakness. Neurologic: Negative for headaches, dizziness, vertigo, memory problems not related to HE. Psychology: Negative for anxiety, depression. FAMILY HISTORY:  The father has/had the following following chronic disease(s): HCV, CAD, Dyslipidemia. The patient has no knowledge of the mother's medical condition. There is no family history of liver disease. The following family members have immune disorders: Aunt, Cousin Autoimmune    SOCIAL HISTORY:  The patient is . The patient has 1 child. The patient smokes marijuana. The patient consumes alcohol on social occasions never in excess. The patient works as a teacher. PHYSICAL EXAMINATION:  VS: per nursing note  General: No acute distress. Eyes: Sclera anicteric. ENT: No oral lesions. Thyroid normal.  Nodes: No adenopathy. Skin: No spider angiomata. No jaundice. No palmar erythema. Respiratory: Lungs clear to auscultation. Cardiovascular: Regular heart rate. No murmurs. No JVD. Abdomen: Soft non-tender, liver size normal to percussion/palpation. Spleen not palpable. No obvious ascites. Extremities: No edema. No muscle wasting. No gross arthritic changes. Neurologic: Alert and oriented. Cranial nerves grossly intact. No asterixis.       LABORATORY STUDIES:  [facility] of [address] [place] [address] & Units 8/27/2019 8/10/2019   HGB 11.5 - 16.0 g/dL  15.6    - 400 K/uL  228   INR 0.8 - 1.2 0.9    AST 0 - 40 IU/L 25 21   ALT 0 - 32 IU/L 28 43   Alk Phos 39 - 117 IU/L 69 85   Bili, Total 0.0 - 1.2 mg/dL 0.8 0.9   Bili, Direct 0.00 - 0.40 mg/dL 0.19    Albumin 3.5 - 5.5 g/dL 4.9 4.1   BUN 6 - 24 mg/dL 10 9   Creat 0.57 - 1.00 mg/dL 0.63 0.80   Na 134 - 144 mmol/L 139 139   K 3.5 - 5.2 mmol/L 4.2 4.0   Cl 96 - 106 mmol/L 101 109 (H)   CO2 20 - 29 mmol/L 23 24   Glucose 65 - 99 mg/dL 87 107 (H)     SEROLOGIES:  Serologies Latest Ref Rng & Units 8/27/2019   Hep A Ab, Total Negative Negative   Hep B Surface Ag Negative Negative   Hep B Core Ab, Total Negative Negative   Hep B Surface AB QL  Reactive   Hep C Ab 0.0 - 0.9 s/co ratio <0.1   Ferritin 15 - 150 ng/mL 206 (H)   Iron % Saturation 15 - 55 % 21   MAURICIO, IFA  Negative   ASMCA 0 - 19 Units 6   Ceruloplasmin 19.0 - 39.0 mg/dL 27.2   Alpha-1 antitrypsin level 90 - 200 mg/dL 131     LIVER HISTOLOGY:  10/2019. FibroScan performed at [facility]. EkPa was 4.1. IQR/med 5%. . The results suggested a fibrosis level of F0. The CAP score suggests  fatty liver. ENDOSCOPIC PROCEDURES:  Not available or performed    RADIOLOGY:  8/2019. Ultrasound of liver. Echogenic consistent with fatty liver. No liver mass lesions. No dilated bile ducts. No ascites. OTHER TESTING:  Not available or performed    FOLLOW-UP:  All of the issues listed above in the Assessment and Plan were discussed with the patient. All questions were answered. The patient expressed a clear understanding of the above. [facility] 87 in 6 months to evaluate weight loss and repeat blood work. Emily Espinoza NP  [place] [id number] [address] Lozano, [address] [place] [address] 22.  [facility]

## 2019-10-18 NOTE — PROGRESS NOTES
Identified pt with two pt identifiers(name and ). Reviewed record in preparation for visit and have obtained necessary documentation. Chief Complaint   Patient presents with    Other     Fibroscan        Health Maintenance Due   Topic    PAP AKA CERVICAL CYTOLOGY     Influenza Age 5 to Adult         Visit Vitals  BP (!) 130/93 (BP 1 Location: Right arm, BP Patient Position: Sitting)   Pulse 100   Temp 97.3 °F (36.3 °C) (Tympanic)   Resp 16   Ht 5' 6\" (1.676 m)   LMP 2016   SpO2 98%   BMI 30.70 kg/m²     Pain Scale: 0 - No pain/10    Coordination of Care Questionnaire:  :   1. Have you been to the ER, urgent care clinic since your last visit? Hospitalized since your last visit? No    2. Have you seen or consulted any other health care providers outside of the [address] [place] [address] since your last visit? Include any pap smears or colon screening.  Yes Where: Endocrinology, Sep 2019

## 2019-11-08 ENCOUNTER — HOSPITAL ENCOUNTER (OUTPATIENT)
Dept: ULTRASOUND IMAGING | Age: 44
Discharge: HOME | End: 2019-11-08
Attending: INTERNAL MEDICINE
Payer: COMMERCIAL

## 2019-11-08 DIAGNOSIS — R19.4 CHANGE IN BOWEL HABITS: ICD-10-CM

## 2019-11-08 DIAGNOSIS — R10.811 RUQ ABDOMINAL TENDERNESS: ICD-10-CM

## 2019-11-08 DIAGNOSIS — Z12.11 COLON CANCER SCREENING: ICD-10-CM

## 2019-11-08 DIAGNOSIS — K92.1 MELENA: ICD-10-CM

## 2019-11-08 PROCEDURE — 76700 US EXAM ABDOM COMPLETE: CPT

## 2019-12-06 LAB
CAMPYLOBACTER STL CULT: NORMAL
E COLI SXT STL QL IA: NEGATIVE
O+P SPEC MICRO: NORMAL
SALM + SHIG STL CULT: NORMAL
WBC STL QL MICRO: NORMAL

## 2019-12-06 NOTE — PROGRESS NOTES
Called and spoke with patient after verifying name and . Notified patient of results and recommendations from provider. Patient was given a chance to ask questions and verbalized an understanding of the information.

## 2020-01-02 ENCOUNTER — TELEPHONE (OUTPATIENT)
Dept: INTERNAL MEDICINE CLINIC | Age: 45
End: 2020-01-02

## 2020-01-02 ENCOUNTER — PATIENT MESSAGE (OUTPATIENT)
Dept: INTERNAL MEDICINE CLINIC | Age: 45
End: 2020-01-02

## 2020-01-02 NOTE — TELEPHONE ENCOUNTER
Call to patient, identified with 2 identifiers. Spoke with her partner, Georgina Marin, on HIPAA. Called per Dr. Lalo Clayton request.  Patient of Keven Alanis NP. Was noted that patient had appointment today to discuss test results, nothing in chart. Patient says we called her and told her to schedule an appointment to review. There is no documentation of that in her chart. She says had a pulmonary study at [facility] and needs to review those records which are not in her chart. She asked me to cancel appointment for today and stated \"this keeps happening with [facility] and I may have to find a new provider\".

## 2020-01-02 NOTE — TELEPHONE ENCOUNTER
Patient is \"panicked\" about why she received a call while out of town, telling her that she needed to be seen urgently. States she changed her flight home to get here for an appt, but then got a call today, saying she did not need to come in. Requesting another call to get this straight. Is concerned it has something to do with tests she had done at [facility] recently.

## 2020-03-26 ENCOUNTER — PATIENT MESSAGE (OUTPATIENT)
Dept: OBGYN CLINIC | Age: 45
End: 2020-03-26

## 2020-03-26 ENCOUNTER — PATIENT MESSAGE (OUTPATIENT)
Dept: INTERNAL MEDICINE CLINIC | Age: 45
End: 2020-03-26

## 2020-03-26 ENCOUNTER — TELEPHONE (OUTPATIENT)
Dept: OBGYN CLINIC | Age: 45
End: 2020-03-26

## 2020-03-26 NOTE — TELEPHONE ENCOUNTER
Pt called after speaking with her PCP, Corinne Kennedy MD. She had a CT on 2/28/20 (results are attached to pt message in media that says \"Patient Entered Attachment- Chest CT\") done at [address] [place] [address] which shows breast changes and lymph node abnormality that the doctor recommends biopsy. Pt's phone # is [phone]. She is very anxious due to past hx.  Thanks

## 2020-03-26 NOTE — TELEPHONE ENCOUNTER
03/26/20  3:30 pm-  Patient called back and is afraid that [address] [facility] [address] did not get the message since headed toward the end of the day and has not heard from Dr. Dora Souza. Philomena Sorto is in clinic, will send message again, high priority that she will see this.

## 2020-03-27 DIAGNOSIS — R59.9 LYMPH NODE ENLARGEMENT: Primary | ICD-10-CM

## 2020-03-27 NOTE — TELEPHONE ENCOUNTER
Spoke w pt at length yesterday regarding current testing and recommend referral to breast center so MRI and lymph node bx can be scheduled

## 2020-04-01 ENCOUNTER — TELEPHONE (OUTPATIENT)
Dept: INTERNAL MEDICINE CLINIC | Age: 45
End: 2020-04-01

## 2020-04-01 DIAGNOSIS — R59.1 LYMPHADENOPATHY: Primary | ICD-10-CM

## 2020-04-02 ENCOUNTER — TELEPHONE (OUTPATIENT)
Dept: INTERNAL MEDICINE CLINIC | Age: 45
End: 2020-04-02

## 2020-04-02 NOTE — TELEPHONE ENCOUNTER
Mary Jane with [facility] [employer] [phone]     States patient did not meet criteria to have the MRI of the breast(s). Need peer to peer with pt's insurance,   [employer], ph# [phone] -- before appt date of 4/6.     Reference # is pt's insurance ID  #[id number]

## 2020-04-02 NOTE — TELEPHONE ENCOUNTER
States patient did not meet criteria to have the MRI of the breast(s). Need peer to peer with pt's insurance,   Leo Delgado, # [phone] -- before appt date of 4/6.     Reference # is pt's insurance ID  #[id number]      Conducted peer to peer review:  chest CT showed enlarged sub pectoral lymph nodes or the right side that is correlated with breast changes. Visual inspection per photos shows erythema. MRI guided biopsy needed. Approval received:   #[id number] Valid April 2-June 30th.

## 2020-04-03 NOTE — TELEPHONE ENCOUNTER
Just received this message as it was forwarded from another inbox. Dr. Madeleine Nichole has spoken with patient on the phone regarding this issue; referral already placed to breast surgery.

## 2020-04-03 NOTE — TELEPHONE ENCOUNTER
From: Ynes Esparza  To: Ashok Peter MD  Sent: 3/26/2020 1:48 PM EDT  Subject: Referral Request    I just had a chest CT done and my pulmonary doctor noted enlarged sub pectoral lymph nodes. He recommended that I immediately see my PCP to get an MRI and a biopsy of the lymph nodes. My PCP is Kareem Herring and she referred me to you because you're my GYN. I've attached a photo of my right breast to show the bruising and slight discoloration. My right breast has changed shape over the past six months and it's slightly pink and hot to the touch. The bruise appeared a couple of days ago and it's a perfect Craig. Also, I have had multiple abnormal mammograms requiring me to get checked every six months. Thank you for your time and attention!

## 2020-04-06 ENCOUNTER — HOSPITAL ENCOUNTER (OUTPATIENT)
Dept: MRI IMAGING | Age: 45
Discharge: HOME | End: 2020-04-06
Attending: NURSE PRACTITIONER
Payer: COMMERCIAL

## 2020-04-06 VITALS — WEIGHT: 185 LBS | BODY MASS INDEX: 27.32 KG/M2

## 2020-04-06 DIAGNOSIS — R59.1 LYMPHADENOPATHY: ICD-10-CM

## 2020-04-06 PROCEDURE — 74011000258 HC RX REV CODE- 258: Performed by: NURSE PRACTITIONER

## 2020-04-06 PROCEDURE — 77049 MRI BREAST C-+ W/CAD BI: CPT

## 2020-04-06 PROCEDURE — A9585 GADOBUTROL INJECTION: HCPCS | Performed by: NURSE PRACTITIONER

## 2020-04-06 PROCEDURE — 74011250636 HC RX REV CODE- 250/636: Performed by: NURSE PRACTITIONER

## 2020-04-06 RX ADMIN — SODIUM CHLORIDE 100 ML: 900 INJECTION, SOLUTION INTRAVENOUS at 12:26

## 2020-04-06 RX ADMIN — GADOBUTROL 10 ML: 604.72 INJECTION INTRAVENOUS at 12:26

## 2020-04-08 NOTE — PROGRESS NOTES
So I reviewed the MRI as well as the recent CT. It looks like these are benign appearing but it is not clear of the etiology. I guess the question should be if one should be biopsied and who is best to do that - ? Interventional radiology. I might ask them if it is amenable to that. Her labs look OK except the one high calcium level. Could it be sarcoid or TB? And that is the reason to consider biopsy.  Vin

## 2020-04-09 DIAGNOSIS — E03.9 ACQUIRED HYPOTHYROIDISM: Primary | ICD-10-CM

## 2020-04-09 DIAGNOSIS — R79.89 ELEVATED FERRITIN: ICD-10-CM

## 2020-04-09 DIAGNOSIS — Z11.1 SCREENING-PULMONARY TB: ICD-10-CM

## 2020-04-09 DIAGNOSIS — E83.52 HYPERCALCEMIA: ICD-10-CM

## 2020-04-11 LAB
ALBUMIN SERPL-MCNC: 4.8 G/DL (ref 3.8–4.8)
ALBUMIN/GLOB SERPL: 2.1 {RATIO} (ref 1.2–2.2)
ALP SERPL-CCNC: 93 IU/L (ref 39–117)
ALT SERPL-CCNC: 32 IU/L (ref 0–32)
AST SERPL-CCNC: 20 IU/L (ref 0–40)
BASOPHILS # BLD AUTO: 0.1 X10E3/UL (ref 0–0.2)
BASOPHILS NFR BLD AUTO: 1 %
BILIRUB SERPL-MCNC: 0.6 MG/DL (ref 0–1.2)
BUN SERPL-MCNC: 9 MG/DL (ref 6–24)
BUN/CREAT SERPL: 11 (ref 9–23)
CALCIUM SERPL-MCNC: 9.9 MG/DL (ref 8.7–10.2)
CHLORIDE SERPL-SCNC: 101 MMOL/L (ref 96–106)
CO2 SERPL-SCNC: 21 MMOL/L (ref 20–29)
CREAT SERPL-MCNC: 0.79 MG/DL (ref 0.57–1)
EOSINOPHIL # BLD AUTO: 0.6 X10E3/UL (ref 0–0.4)
EOSINOPHIL NFR BLD AUTO: 7 %
ERYTHROCYTE [DISTWIDTH] IN BLOOD BY AUTOMATED COUNT: 12.9 % (ref 11.7–15.4)
FERRITIN SERPL-MCNC: 175 NG/ML (ref 15–150)
FOLATE SERPL-MCNC: 8.5 NG/ML
GAMMA INTERFERON BACKGROUND BLD IA-ACNC: 0.02 IU/ML
GLOBULIN SER CALC-MCNC: 2.3 G/DL (ref 1.5–4.5)
GLUCOSE SERPL-MCNC: 111 MG/DL (ref 65–99)
HCT VFR BLD AUTO: 44.2 % (ref 34–46.6)
HGB BLD-MCNC: 15 G/DL (ref 11.1–15.9)
IMM GRANULOCYTES # BLD AUTO: 0 X10E3/UL (ref 0–0.1)
IMM GRANULOCYTES NFR BLD AUTO: 0 %
IRON SATN MFR SERPL: 18 % (ref 15–55)
IRON SERPL-MCNC: 59 UG/DL (ref 27–159)
LYMPHOCYTES # BLD AUTO: 2.5 X10E3/UL (ref 0.7–3.1)
LYMPHOCYTES NFR BLD AUTO: 27 %
M TB IFN-G BLD-IMP: NEGATIVE
M TB IFN-G CD4+ BCKGRND COR BLD-ACNC: 0.02 IU/ML
MCH RBC QN AUTO: 28.5 PG (ref 26.6–33)
MCHC RBC AUTO-ENTMCNC: 33.9 G/DL (ref 31.5–35.7)
MCV RBC AUTO: 84 FL (ref 79–97)
MITOGEN IGNF BLD-ACNC: 4.05 IU/ML
MONOCYTES # BLD AUTO: 0.7 X10E3/UL (ref 0.1–0.9)
MONOCYTES NFR BLD AUTO: 8 %
NEUTROPHILS # BLD AUTO: 5.3 X10E3/UL (ref 1.4–7)
NEUTROPHILS NFR BLD AUTO: 57 %
PLATELET # BLD AUTO: 264 X10E3/UL (ref 150–450)
POTASSIUM SERPL-SCNC: 4.6 MMOL/L (ref 3.5–5.2)
PROT SERPL-MCNC: 7.1 G/DL (ref 6–8.5)
QUANTIFERON INCUBATION, QF1T: NORMAL
QUANTIFERON TB2 AG: 0.01 IU/ML
RBC # BLD AUTO: 5.26 X10E6/UL (ref 3.77–5.28)
RETICS/RBC NFR AUTO: 1.4 % (ref 0.6–2.6)
SERVICE CMNT-IMP: NORMAL
SODIUM SERPL-SCNC: 140 MMOL/L (ref 134–144)
T4 FREE SERPL-MCNC: 0.85 NG/DL (ref 0.82–1.77)
TIBC SERPL-MCNC: 323 UG/DL (ref 250–450)
TSH SERPL DL<=0.005 MIU/L-ACNC: 2.02 UIU/ML (ref 0.45–4.5)
UIBC SERPL-MCNC: 264 UG/DL (ref 131–425)
VIT B12 SERPL-MCNC: 673 PG/ML (ref 232–1245)
WBC # BLD AUTO: 9.3 X10E3/UL (ref 3.4–10.8)

## 2020-04-22 DIAGNOSIS — F43.10 PTSD (POST-TRAUMATIC STRESS DISORDER): ICD-10-CM

## 2020-04-23 RX ORDER — SERTRALINE HYDROCHLORIDE 100 MG/1
TABLET, FILM COATED ORAL
Qty: 180 TAB | Refills: 2 | Status: SHIPPED | OUTPATIENT
Start: 2020-04-23 | End: 2020-09-25

## 2020-04-23 NOTE — TELEPHONE ENCOUNTER
Thank you for rhe message on her. I email with her almost weekly so I am up to date on her issues. She is one of my high anxiety patients.

## 2020-04-27 DIAGNOSIS — F41.9 ANXIETY: ICD-10-CM

## 2020-04-27 DIAGNOSIS — G47.00 INSOMNIA, UNSPECIFIED TYPE: Primary | ICD-10-CM

## 2020-04-27 RX ORDER — LORAZEPAM 1 MG/1
1 TABLET ORAL 2 TIMES DAILY
Qty: 30 TAB | Refills: 1 | Status: SHIPPED | OUTPATIENT
Start: 2020-04-27 | End: 2020-08-09 | Stop reason: SDUPTHER

## 2020-04-27 NOTE — TELEPHONE ENCOUNTER
Future Appointments   Date Time Provider Danica Alejandro   4/30/2020 11:00 AM Anand Arroyo MD [facility] [address] [employer] [address]         Last Appointment With Me:  Visit date not found

## 2020-04-30 ENCOUNTER — VIRTUAL VISIT (OUTPATIENT)
Dept: SLEEP MEDICINE | Age: 45
End: 2020-04-30

## 2020-04-30 VITALS
DIASTOLIC BLOOD PRESSURE: 74 MMHG | BODY MASS INDEX: 27.25 KG/M2 | WEIGHT: 184 LBS | HEIGHT: 69 IN | SYSTOLIC BLOOD PRESSURE: 113 MMHG

## 2020-04-30 DIAGNOSIS — F41.9 ANXIETY AND DEPRESSION: ICD-10-CM

## 2020-04-30 DIAGNOSIS — F32.A ANXIETY AND DEPRESSION: ICD-10-CM

## 2020-04-30 DIAGNOSIS — J45.20 MILD INTERMITTENT ASTHMA WITHOUT COMPLICATION: ICD-10-CM

## 2020-04-30 DIAGNOSIS — G47.33 OSA (OBSTRUCTIVE SLEEP APNEA): Primary | ICD-10-CM

## 2020-04-30 NOTE — PROGRESS NOTES
[address] [place]. [place], [address]  Tel.  [phone]  Fax. [address]  [place], [address] [facility]  Tel.  [phone]  Fax. [phone] [address] [facility] [address]  Tel.  [phone]  Fax. [phone]         Subjective:    Jasmin Burch is a 38 y.o. female who was seen by synchronous (real-time) audio-video technology on 4/30/2020. Consent:  She is aware that this patient-initiated Telehealth encounter is a billable service, with coverage as determined by her insurance carrier. She is aware that she may receive a bill and has provided verbal consent to proceed: Yes    I was in the office while conducting this encounter. Patient verified with 's License. She complains of snoring associated with snorting, choking, tossing and turning, kicking, awakening in the middle of the night because of urination and due to night sweats. Symptoms began 5 years ago, gradually worsening since that time. She usually can fall asleep in 5-10 minutes. Family or house members note snoring. She denies completely or partially paralyzed while falling asleep or waking up. Jasmin Burch does wake up frequently at night. She is bothered by waking up too early and left unable to get back to sleep. She actually sleeps about 7 hours at night and wakes up about 3 times during the night. She does not work shifts: Mae Magaña indicates she does not get too little sleep at night. Her bedtime is 2200. She awakens at 0600. She does take naps. She takes 5 naps a week lasting 1, Hour(s). She has the following observed behaviors: Loud snoring, Light snoring, Pauses in breathing, Twitching of legs or feet, Grinding teeth, Becoming very rigid and/or shaking(waking with gasp or snort, vivid dreams, hallucinations ); Nightmares. Other remarks:  She has a history of bruxism and reports of having chewed through 2 occlusal guards.       [place] Sleepiness Score: 17   and Modified F.O.S.Q. Score Total / 2: 13.5   which reflect improved sleep quality over therapy time. Allergies   Allergen Reactions    Latex Rash     cellulitis    Sulfa (Sulfonamide Antibiotics) Anaphylaxis    Adhesive Tape-Silicones Rash    Morphine Rash    Neosporin [Benzalkonium Chloride] Rash         Current Outpatient Medications:     LORazepam (ATIVAN) 1 mg tablet, Take 1 Tab by mouth two (2) times a day. Max Daily Amount: 2 mg. Prn anxiety  Indications: anxious, Disp: 30 Tab, Rfl: 1    sertraline (ZOLOFT) 100 mg tablet, TAKE 2 TABLETS BY MOUTH EVERY DAY, Disp: 180 Tab, Rfl: 2    mupirocin (BACTROBAN) 2 % ointment, Apply  to affected area daily. , Disp: 22 g, Rfl: 0    diclofenac (VOLTAREN) 1 % gel, Apply 2 g to affected area four (4) times daily. , Disp: 100 g, Rfl: 2    atorvastatin (LIPITOR) 20 mg tablet, TAKE 1 TABLET BY MOUTH EVERY DAY, Disp: 90 Tab, Rfl: 3    aspirin delayed-release 81 mg tablet, Take  by mouth daily. , Disp: , Rfl:     CETIRIZINE HCL (ZYRTEC PO), Take  by mouth as needed. , Disp: , Rfl:     SYNTHROID 50 mcg tablet, Take 50 mcg by mouth daily. , Disp: , Rfl: 6     She  has a past medical history of Adverse effect of anesthesia, Anxiety, Arthritis, Asperger syndrome, Asthma, Depression, Hypercholesterolemia, IBD (inflammatory bowel disease), Ill-defined condition, Irritable bowel disease (1995), Neurological disorder, and Psychiatric disorder. She  has a past surgical history that includes hx other surgical; hx other surgical; hx orthopaedic; hx orthopaedic; hx orthopaedic; and hx hysterectomy (4/14/16). She family history includes Alzheimer in her maternal grandmother; Cancer in her mother and sister; Depression in her father; Diabetes in her maternal grandfather; Heart Disease in her father; Heart Surgery in her paternal grandfather and paternal grandmother. She  reports that she quit smoking about 16 years ago. She has a 10.00 pack-year smoking history.  She has never used smokeless tobacco. She reports previous alcohol use. She reports current drug use. Drug: Marijuana. Review of Systems:  Constitutional:  No significant weight loss or weight gain  Eyes:  No blurred vision  CVS:  No significant chest pain  Pulm:  No significant shortness of breath  GI:  No significant nausea or vomiting  :  + significant nocturia  Musculoskeletal:  No significant joint pain at night  Skin:  No significant rashes  Neuro:  No significant dizziness   Psych:  No active mood issues    Sleep Review of Systems: notable for no difficulty falling asleep; frequent awakenings at night;  regular dreaming noted; occasional nightmares ; no early morning headaches; no memory problems; no concentration issues; no history of any automobile or occupational accidents due to daytime drowsiness. Objective:   Vitals reported by patient to Medical Assistant    Visit Vitals  /74   Ht 5' 9\" (1.753 m)   Wt 184 lb (83.5 kg)   LMP 03/18/2016   BMI 27.17 kg/m²          Physical Exam completed by visual and auditory observation of patient with verbal input from patient. General:   Alert, oriented, not in acute distress   Eyes:  Anicteric Sclerae; no obvious strabismus   Nose:  No obvious nasal septum deviation    Neck:   Midline trachea, no visible mass   Chest/Lungs:  Respiratory effort normal, no visualized signs of difficulty breathing or respiratory distress   CVS:  No JVD   Extremities:  No obvious rashes noted on face, neck, or hands   Neuro:  No facial asymmetry, no focal deficits; no obvious tremor    Psych:  Normal affect,  normal countenance       Assessment:       ICD-10-CM ICD-9-CM    1. RERE (obstructive sleep apnea) G47.33 327.23 SLEEP STUDY UNATTENDED, 4 CHANNEL   2. Mild intermittent asthma without complication W47.59 739.10    3. Anxiety and depression F41.9 300.00     F32.9 311    4.  BMI 27.0-27.9,adult Z68.27 V85.23          Plan:        Sleep testing was ordered for initial evaluation.  She was provided information on sleep apnea including coresponding risk factors and the importance of proper treatment.  Treatment options if indicated were reviewed today. Patient agrees to a trial of PAP therapy if indicated.  Counseling was provided regarding proper sleep hygiene, appropriate sleep schedule, need for sleep environment safety and safe driving.  Recommended a dedicated weight loss program through appropriate diet and exercise regimen as significant weight reduction has been shown to reduce severity of obstructive sleep apnea. Patient's phone number [phone] (home)  was reviewed and confirmed for accuracy. She gives permission for messages regarding results and appointments to be left at that number. Pursuant to the emergency declaration under the [facility], [facility] waiver authority and the [employer] and [employer] Act, this Virtual Visit was conducted, with patient's consent, to reduce the patient's risk of exposure to COVID-19 and provide continuity of care for an established patient. Services were provided through a video synchronous discussion virtually to substitute for in-person clinic visit. Sandra Fraser MD, FAASM  Electronically signed.  04/30/20

## 2020-04-30 NOTE — PATIENT INSTRUCTIONS
[address] [facility] [place]. [address] [facility], [address] Tel.  [phone] Fax. [address] [facility] [address] [place], [address] [facility] Tel.  [phone] Fax. [phone] [address] Tel.  [phone] Fax. [phone] Sleep Apnea: After Your Visit Your Care Instructions Sleep apnea occurs when you frequently stop breathing for 10 seconds or longer during sleep. It can be mild to severe, based on the number of times per hour that you stop breathing or have slowed breathing. Blocked or narrowed airways in your nose, mouth, or throat can cause sleep apnea. Your airway can become blocked when your throat muscles and tongue relax during sleep. Sleep apnea is common, occurring in 1 out of 20 individuals. Individuals having any of the following characteristics should be evaluated and treated right away due to high risk and detrimental consequences from untreated sleep apnea: 
1. Obesity 2. Congestive Heart failure 3. Atrial Fibrillation 4. Uncontrolled Hypertension 5. Type II Diabetes 6. Night-time Arrhythmias 7. Stroke 8. Pulmonary Hypertension 9. High-risk Driving Populations (pilots, truck drivers, etc.) 10. Patients Considering Weight-loss Surgery How do you know you have sleep apnea? You probably have sleep apnea if you answer 'yes' to 3 or more of the following questions: S - Have you been told that you Snore? T - Are you often Tired during the day? O - Has anyone Observed you stop breathing while sleeping? P- Do you have (or are being treated for) high blood Pressure? B - Are you obese (Body Mass Index > 35)? A - Is your Age 48years old or older? N - Is your Neck size greater than 16 inches? G - Are you male Gender? A sleep physician can prescribe a breathing device that prevents tissues in the throat from blocking your airway.  Or your doctor may recommend using a dental device (oral breathing device) to help keep your airway open. In some cases, surgery may be needed to remove enlarged tissues in the throat. Follow-up care is a key part of your treatment and safety. Be sure to make and go to all appointments, and call your doctor if you are having problems. It's also a good idea to know your test results and keep a list of the medicines you take. How can you care for yourself at home? · Lose weight, if needed. It may reduce the number of times you stop breathing or have slowed breathing. · Go to bed at the same time every night. · Sleep on your side. It may stop mild apnea. If you tend to roll onto your back, sew a pocket in the back of your pajama top. Put a tennis ball into the pocket, and stitch the pocket shut. This will help keep you from sleeping on your back. · Avoid alcohol and medicines such as sleeping pills and sedatives before bed. · Do not smoke. Smoking can make sleep apnea worse. If you need help quitting, talk to your doctor about stop-smoking programs and medicines. These can increase your chances of quitting for good. · Prop up the head of your bed 4 to 6 inches by putting bricks under the legs of the bed. · Treat breathing problems, such as a stuffy nose, caused by a cold or allergies. · Use a continuous positive airway pressure (CPAP) breathing machine if lifestyle changes do not help your apnea and your doctor recommends it. The machine keeps your airway from closing when you sleep. · If CPAP does not help you, ask your doctor whether you should try other breathing machines. A bilevel positive airway pressure machine has two types of air pressureâone for breathing in and one for breathing out. Another device raises or lowers air pressure as needed while you breathe. · If your nose feels dry or bleeds when using one of these machines, talk with your doctor about increasing moisture in the air. A humidifier may help.  
· If your nose is runny or stuffy from using a breathing machine, talk with your doctor about using decongestants or a corticosteroid nasal spray. When should you call for help? Watch closely for changes in your health, and be sure to contact your doctor if: 
· You still have sleep apnea even though you have made lifestyle changes. · You are thinking of trying a device such as CPAP. · You are having problems using a CPAP or similar machine. Where can you learn more? Go to YellowBrck. Enter F942 in the search box to learn more about \"Sleep Apnea: After Your Visit. \"  
© 0232-5812 Healthwise, Incorporated. Care instructions adapted under license by [facility] [employer] (which disclaims liability or warranty for this information). This care instruction is for use with your licensed healthcare professional. If you have questions about a medical condition or this instruction, always ask your healthcare professional. Bruno Gave any warranty or liability for your use of this information. PROPER SLEEP HYGIENE What to avoid · Do not have drinks with caffeine, such as coffee or black tea, for 8 hours before bed. · Do not smoke or use other types of tobacco near bedtime. Nicotine is a stimulant and can keep you awake. · Avoid drinking alcohol late in the evening, because it can cause you to wake in the middle of the night. · Do not eat a big meal close to bedtime. If you are hungry, eat a light snack. · Do not drink a lot of water close to bedtime, because the need to urinate may wake you up during the night. · Do not read or watch TV in bed. Use the bed only for sleeping and sexual activity. What to try · Go to bed at the same time every night, and wake up at the same time every morning. Do not take naps during the day. · Keep your bedroom quiet, dark, and cool. · Get regular exercise, but not within 3 to 4 hours of your bedtime. Adrian Obrien · Sleep on a comfortable pillow and mattress. · If watching the clock makes you anxious, turn it facing away from you so you cannot see the time. · If you worry when you lie down, start a worry book. Well before bedtime, write down your worries, and then set the book and your concerns aside. · Try meditation or other relaxation techniques before you go to bed. · If you cannot fall asleep, get up and go to another room until you feel sleepy. Do something relaxing. Repeat your bedtime routine before you go to bed again. · Make your house quiet and calm about an hour before bedtime. Turn down the lights, turn off the TV, log off the computer, and turn down the volume on music. This can help you relax after a busy day. Drowsy Driving The [employer] cites drowsiness as a causing factor in more than 911,884 police reported crashes annually, resulting in 76,000 injuries and 1,500 deaths. Other surveys suggest 55% of people polled have driven while drowsy in the past year, 23% had fallen asleep but not crashed, 3% crashed, and 2% had and accident due to drowsy driving. Who is at risk? Young Drivers: One study of drowsy driving accidents states that 55% of the drivers were under 25 years. Of those, 75% were male. Shift Workers and Travelers: People who work overnight or travel across time zones frequently are at higher risk of experiencing Circadian Rhythm Disorders. They are trying to work and function when their body is programed to sleep. Sleep Deprived: Lack of sleep has a serious impact on your ability to pay attention or focus on a task. Consistently getting less than the average of 8 hours your body needs creates partial or cumulative sleep deprivation. Untreated Sleep Disorders: Sleep Apnea, Narcolepsy, R.L.S., and other sleep disorders (untreated) prevent a person from getting enough restful sleep.  This leads to excessive daytime sleepiness and increases the risk for drowsy driving accidents by up to 7 times. Medications / Alcohol: Even over the counter medications can cause drowsiness. Medications that impair a drivers attention should have a warning label. Alcohol naturally makes you sleepy and on its own can cause accidents. Combined with excessive drowsiness its effects are amplified. Signs of Drowsy Driving: * You don't remember driving the last few miles * You may drift out of your byron * You are unable to focus and your thoughts wander * You may yawn more often than normal 
 * You have difficulty keeping your eyes open / nodding off * Missing traffic signs, speeding, or tailgating Prevention-  
Good sleep hygiene, lifestyle and behavioral choices have the most impact on drowsy driving. There is no substitute for sleep and the average person requires 8 hours nightly. If you find yourself driving drowsy, stop and sleep. Consider the sleep hygiene tips provided during your visit as well. Medication Refill Policy: Refills for all medications require 1 week advance notice. Please have your pharmacy fax a refill request. We are unable to fax, or call in \"controled substance\" medications and you will need to pick these prescriptions up from our office. [employer] Activation Thank you for requesting access to [employer]. Please follow the instructions below to securely access and download your online medical record. [employer] allows you to send messages to your doctor, view your test results, renew your prescriptions, schedule appointments, and more. How Do I Sign Up? 1. In your internet browser, go to [email or web address]. 2. Click on the First Time User? Click Here link in the Sign In box. You will see the New Member Sign Up page. 3. Enter your [employer] Access Code exactly as it appears below. You will not need to use this code after youve completed the sign-up process.  If you do not sign up before the expiration date, you must request a new code. [employer] Access Code: Activation code not generated Current [employer] Status: Active (This is the date your [employer] access code will ) 4. Enter the last four digits of your Social Security Number (xxxx) and Date of Birth (mm/dd/yyyy) as indicated and click Submit. You will be taken to the next sign-up page. 5. Create a [employer]M Softwaret ID. This will be your [employer] login ID and cannot be changed, so think of one that is secure and easy to remember. 6. Create a [employer] password. You can change your password at any time. 7. Enter your Password Reset Question and Answer. This can be used at a later time if you forget your password. 8. Enter your e-mail address. You will receive e-mail notification when new information is available in [address] 9. Click Sign Up. You can now view and download portions of your medical record. 10. Click the Download Summary menu link to download a portable copy of your medical information. Additional Information If you have questions, please call [phone]. Remember, [employer] is NOT to be used for urgent needs. For medical emergencies, dial 911.

## 2020-05-14 ENCOUNTER — HOSPITAL ENCOUNTER (OUTPATIENT)
Dept: NUCLEAR MEDICINE | Age: 45
Discharge: HOME | End: 2020-05-14
Attending: PHYSICIAN ASSISTANT
Payer: COMMERCIAL

## 2020-05-14 VITALS — BODY MASS INDEX: 27.32 KG/M2 | WEIGHT: 185 LBS

## 2020-05-14 DIAGNOSIS — R19.7 DIARRHEA: ICD-10-CM

## 2020-05-14 DIAGNOSIS — R10.13 EPIGASTRIC ABDOMINAL PAIN: ICD-10-CM

## 2020-05-14 PROCEDURE — 74011000258 HC RX REV CODE- 258: Performed by: PHYSICIAN ASSISTANT

## 2020-05-14 PROCEDURE — 74011250636 HC RX REV CODE- 250/636: Performed by: PHYSICIAN ASSISTANT

## 2020-05-14 PROCEDURE — 78227 HEPATOBIL SYST IMAGE W/DRUG: CPT

## 2020-05-14 RX ORDER — SODIUM CHLORIDE 0.9 % (FLUSH) 0.9 %
10 SYRINGE (ML) INJECTION
Status: COMPLETED | OUTPATIENT
Start: 2020-05-14 | End: 2020-05-14

## 2020-05-14 RX ORDER — SODIUM CHLORIDE 9 MG/ML
25 INJECTION, SOLUTION INTRAVENOUS
Status: COMPLETED | OUTPATIENT
Start: 2020-05-14 | End: 2020-05-14

## 2020-05-14 RX ADMIN — SODIUM CHLORIDE 25 ML: 900 INJECTION, SOLUTION INTRAVENOUS at 12:25

## 2020-05-14 RX ADMIN — Medication 10 ML: at 11:10

## 2020-05-14 RX ADMIN — SINCALIDE 1.68 MCG: 5 INJECTION, POWDER, LYOPHILIZED, FOR SOLUTION INTRAVENOUS at 12:25

## 2020-05-27 ENCOUNTER — HOSPITAL ENCOUNTER (OUTPATIENT)
Dept: NUCLEAR MEDICINE | Age: 45
Discharge: HOME | End: 2020-05-27
Attending: INTERNAL MEDICINE
Payer: COMMERCIAL

## 2020-05-27 ENCOUNTER — HOSPITAL ENCOUNTER (OUTPATIENT)
Dept: GENERAL RADIOLOGY | Age: 45
Discharge: HOME | End: 2020-05-27
Attending: INTERNAL MEDICINE
Payer: COMMERCIAL

## 2020-05-27 DIAGNOSIS — R19.7 DIARRHEA OF PRESUMED INFECTIOUS ORIGIN: ICD-10-CM

## 2020-05-27 DIAGNOSIS — R10.13 ABDOMINAL PAIN, EPIGASTRIC: ICD-10-CM

## 2020-05-27 DIAGNOSIS — R10.13 EPIGASTRIC ABDOMINAL PAIN: ICD-10-CM

## 2020-05-27 PROCEDURE — 78264 GASTRIC EMPTYING IMG STUDY: CPT

## 2020-05-27 PROCEDURE — 74018 RADEX ABDOMEN 1 VIEW: CPT

## 2020-07-09 DIAGNOSIS — J45.20 MILD INTERMITTENT ASTHMA WITHOUT COMPLICATION: Primary | ICD-10-CM

## 2020-07-09 RX ORDER — ALBUTEROL SULFATE 90 UG/1
1 AEROSOL, METERED RESPIRATORY (INHALATION)
Qty: 1 INHALER | Refills: 2 | Status: SHIPPED | OUTPATIENT
Start: 2020-07-09 | End: 2021-11-16 | Stop reason: SDUPTHER

## 2020-07-21 ENCOUNTER — VIRTUAL VISIT (OUTPATIENT)
Dept: INTERNAL MEDICINE CLINIC | Age: 45
End: 2020-07-21

## 2020-07-21 DIAGNOSIS — E78.5 HYPERLIPIDEMIA, UNSPECIFIED HYPERLIPIDEMIA TYPE: ICD-10-CM

## 2020-07-21 DIAGNOSIS — E55.9 VITAMIN D DEFICIENCY: ICD-10-CM

## 2020-07-21 DIAGNOSIS — Z00.00 ROUTINE PHYSICAL EXAMINATION: ICD-10-CM

## 2020-07-21 DIAGNOSIS — F41.9 ANXIETY: ICD-10-CM

## 2020-07-21 DIAGNOSIS — E03.9 ACQUIRED HYPOTHYROIDISM: ICD-10-CM

## 2020-07-21 DIAGNOSIS — F45.8 BRUXISM (TEETH GRINDING): Primary | ICD-10-CM

## 2020-07-21 DIAGNOSIS — E83.52 HYPERCALCEMIA: ICD-10-CM

## 2020-07-21 DIAGNOSIS — G47.00 INSOMNIA, UNSPECIFIED TYPE: ICD-10-CM

## 2020-07-21 RX ORDER — TRAZODONE HYDROCHLORIDE 50 MG/1
50 TABLET ORAL
Qty: 30 TAB | Refills: 0 | Status: SHIPPED | OUTPATIENT
Start: 2020-07-21 | End: 2020-08-09 | Stop reason: SDUPTHER

## 2020-07-21 NOTE — PROGRESS NOTES
Roberto Carlos White is a 41 y.o. female who was seen by synchronous (real-time) audio technology on 7/21/2020. Assessment & Plan:   Diagnoses and all orders for this visit:    1. Bruxism (teeth grinding)  -     REFERRAL TO SLEEP STUDIES    2. Insomnia, unspecified type  -     traZODone (DESYREL) 50 mg tablet; Take 1 Tab by mouth nightly. 3. Acquired hypothyroidism  -     TSH 3RD GENERATION    4. Anxiety    5. Hypercalcemia  -     METABOLIC PANEL, COMPREHENSIVE    6. Vitamin D deficiency  -     VITAMIN D, 25 HYDROXY    7. Routine physical examination  -     CBC WITH AUTOMATED DIFF  -     LIPID PANEL  -     METABOLIC PANEL, COMPREHENSIVE  -     TSH 3RD GENERATION    8. Hyperlipidemia, unspecified hyperlipidemia type  -     LIPID PANEL      Follow-up and Dispositions    · Return in about 4 weeks (around 8/18/2020) for Let's plan for another call or video conference to check back in on things inluding the lymphedema. .           Subjective:   Roberto Carlos White was seen for Medication Evaluation    Mental Health Review  Patient is seen for anxiety disorder. Since last visit: she is using ativan 2 times weekly, GI reduced her Zoloft from 200mg to 100mg because they thought is aggravated her gastroparesis. She notes restlessness at night. She denies: SI/SA. Reported side effects from the treatment: none. Fatigue: she notes she is falling asleep while watching tv, she is grinding her teeth at night. She uses ativan to sleep through the night and notes she get good sleep so she has less down time drowsiness. Gastroparesis: she has been told to change her diet. She has changed from paleo to more processed foods that are easier to digest.     Prior to Admission medications    Medication Sig Start Date End Date Taking? Authorizing Provider   albuterol (PROVENTIL HFA, VENTOLIN HFA, PROAIR HFA) 90 mcg/actuation inhaler Take 1 Puff by inhalation every six (6) hours as needed for Wheezing.  7/9/20   Skiff, Durene Sprout, NP LORazepam (ATIVAN) 1 mg tablet Take 1 Tab by mouth two (2) times a day. Max Daily Amount: 2 mg. Prn anxiety  Indications: anxious 4/27/20   Skiff, Alexa Gory, NP   sertraline (ZOLOFT) 100 mg tablet TAKE 2 TABLETS BY MOUTH EVERY DAY 4/23/20   Skiff, Alexa Gory, NP   SYNTHROID 50 mcg tablet Take 50 mcg by mouth daily. 9/26/19   Provider, Historical   mupirocin (BACTROBAN) 2 % ointment Apply  to affected area daily. 10/8/19   Skiff, Alexa Gory, NP   diclofenac (VOLTAREN) 1 % gel Apply 2 g to affected area four (4) times daily. 10/8/19   Skiff, Alexa Gory, NP   atorvastatin (LIPITOR) 20 mg tablet TAKE 1 TABLET BY MOUTH EVERY DAY 7/29/19   Skiff, Alexa Gory, NP   aspirin delayed-release 81 mg tablet Take  by mouth daily. Provider, Historical   CETIRIZINE HCL (ZYRTEC PO) Take  by mouth as needed. Provider, Historical       Allergies   Allergen Reactions    Latex Rash     cellulitis    Sulfa (Sulfonamide Antibiotics) Anaphylaxis    Adhesive Tape-Silicones Rash    Morphine Rash    Neosporin [Benzalkonium Chloride] Rash         Past Medical History:   Diagnosis Date    Adverse effect of anesthesia     hard to wake up    Anxiety     Arthritis     back    Asperger syndrome     Asthma     Depression     Hypercholesterolemia     IBD (inflammatory bowel disease)     Ill-defined condition     discomfort hips shoulders knees and feet and hands MAURICIO was positive    Irritable bowel disease 1995    Neurological disorder     Psychiatric disorder     anxiety and depression     Past Surgical History:   Procedure Laterality Date    HX HYSTERECTOMY  4/14/16    da Dotty Robotic Total Laparoscopic Hysterectomy with bilateral  salpingectomy more than 250 grams. Cystoscopy.     HX ORTHOPAEDIC      Spinal fusion, back surgery,rods and screws    HX ORTHOPAEDIC      achilles lenghtening    HX ORTHOPAEDIC      laminectomy    HX OTHER SURGICAL      tumor removed from nose as child    HX OTHER SURGICAL      dental extraction       ROS - per HPI      Objective:     General: alert, cooperative, no distress   Mental  status: normal mood, behavior, speech, dress, motor activity, and thought processes, able to follow commands   Resp: normal effort and no respiratory distress   Neuro: no gross deficits   Psychiatric: normal affect       We discussed the expected course, resolution and complications of the diagnosis(es) in detail. Medication risks, benefits, costs, interactions, and alternatives were discussed as indicated. I advised her to contact the office if her condition worsens, changes or fails to improve as anticipated. She expressed understanding with the diagnosis(es) and plan. Finesse Deshpande is a 41 y.o. female who was evaluated by a video visit encounter for concerns as above. Patient identification was verified prior to start of the visit. A caregiver was present when appropriate. Due to this being a TeleHealth encounter (During XVVXB-86 public health emergency), evaluation of the following organ systems was limited: Vitals/Constitutional/EENT/Resp/CV/GI//MS/Neuro/Skin/Heme-Lymph-Imm. Pursuant to the emergency declaration under the [facility], 1135 waiver authority and the [employer] Act, this Virtual  Visit was conducted, with patient's (and/or legal guardian's) consent, to reduce the patient's risk of exposure to COVID-19 and provide necessary medical care. Services were provided through a synchronous discussion virtually to substitute for in-person clinic visit. I was at home. The patient was at home.     Jersey Harris NP

## 2020-08-04 LAB
25(OH)D3+25(OH)D2 SERPL-MCNC: 30.8 NG/ML (ref 30–100)
ALBUMIN SERPL-MCNC: 4.9 G/DL (ref 3.8–4.8)
ALBUMIN/GLOB SERPL: 1.9 {RATIO} (ref 1.2–2.2)
ALP SERPL-CCNC: 77 IU/L (ref 39–117)
ALT SERPL-CCNC: 38 IU/L (ref 0–32)
AST SERPL-CCNC: 28 IU/L (ref 0–40)
BASOPHILS # BLD AUTO: 0.1 X10E3/UL (ref 0–0.2)
BASOPHILS NFR BLD AUTO: 1 %
BILIRUB SERPL-MCNC: 1 MG/DL (ref 0–1.2)
BUN SERPL-MCNC: 8 MG/DL (ref 6–24)
BUN/CREAT SERPL: 9 (ref 9–23)
CALCIUM SERPL-MCNC: 10.2 MG/DL (ref 8.7–10.2)
CHLORIDE SERPL-SCNC: 101 MMOL/L (ref 96–106)
CHOLEST SERPL-MCNC: 179 MG/DL (ref 100–199)
CO2 SERPL-SCNC: 24 MMOL/L (ref 20–29)
CREAT SERPL-MCNC: 0.86 MG/DL (ref 0.57–1)
EOSINOPHIL # BLD AUTO: 0.3 X10E3/UL (ref 0–0.4)
EOSINOPHIL NFR BLD AUTO: 4 %
ERYTHROCYTE [DISTWIDTH] IN BLOOD BY AUTOMATED COUNT: 13.7 % (ref 11.7–15.4)
GLOBULIN SER CALC-MCNC: 2.6 G/DL (ref 1.5–4.5)
GLUCOSE SERPL-MCNC: 108 MG/DL (ref 65–99)
HCT VFR BLD AUTO: 47.4 % (ref 34–46.6)
HDLC SERPL-MCNC: 49 MG/DL
HGB BLD-MCNC: 15.3 G/DL (ref 11.1–15.9)
IMM GRANULOCYTES # BLD AUTO: 0 X10E3/UL (ref 0–0.1)
IMM GRANULOCYTES NFR BLD AUTO: 0 %
LDLC SERPL CALC-MCNC: 105 MG/DL (ref 0–99)
LYMPHOCYTES # BLD AUTO: 2 X10E3/UL (ref 0.7–3.1)
LYMPHOCYTES NFR BLD AUTO: 25 %
MCH RBC QN AUTO: 28.7 PG (ref 26.6–33)
MCHC RBC AUTO-ENTMCNC: 32.3 G/DL (ref 31.5–35.7)
MCV RBC AUTO: 89 FL (ref 79–97)
MONOCYTES # BLD AUTO: 0.5 X10E3/UL (ref 0.1–0.9)
MONOCYTES NFR BLD AUTO: 7 %
NEUTROPHILS # BLD AUTO: 4.9 X10E3/UL (ref 1.4–7)
NEUTROPHILS NFR BLD AUTO: 63 %
PLATELET # BLD AUTO: 239 X10E3/UL (ref 150–450)
POTASSIUM SERPL-SCNC: 4.1 MMOL/L (ref 3.5–5.2)
PROT SERPL-MCNC: 7.5 G/DL (ref 6–8.5)
RBC # BLD AUTO: 5.33 X10E6/UL (ref 3.77–5.28)
SODIUM SERPL-SCNC: 139 MMOL/L (ref 134–144)
TRIGL SERPL-MCNC: 124 MG/DL (ref 0–149)
TSH SERPL DL<=0.005 MIU/L-ACNC: 1.52 UIU/ML (ref 0.45–4.5)
VLDLC SERPL CALC-MCNC: 25 MG/DL (ref 5–40)
WBC # BLD AUTO: 7.8 X10E3/UL (ref 3.4–10.8)

## 2020-08-05 DIAGNOSIS — R73.9 HYPERGLYCEMIA: Primary | ICD-10-CM

## 2020-08-05 NOTE — PROGRESS NOTES
Cholesterol looks good. Were you fasting for these labs? Your sugar level is high. Albumin and ALT liver function are elevated. Please make sure your GI know this.      Thyroid and vitamin D normal.

## 2020-08-07 ENCOUNTER — OFFICE VISIT (OUTPATIENT)
Dept: SLEEP MEDICINE | Age: 45
End: 2020-08-07

## 2020-08-07 DIAGNOSIS — G47.33 OSA (OBSTRUCTIVE SLEEP APNEA): Primary | ICD-10-CM

## 2020-08-07 NOTE — PROGRESS NOTES
S>Janna Quigley is a 41 y.o. female seen today to receive a home sleep testing unit (HST). · Patient was educated on proper hookup and operation of the HST via detailed instruction sheet (per COVID-19 precautions)  · Instruction forms with after hours contact and documentation were signed. O>    Visit Vitals  LMP 03/18/2016         A>  No diagnosis found. P>  · General information regarding operations and maintenance of the device was provided. · Follow-up appointment was made to return the HST. She will be contacted once the results have been reviewed. · She was asked to contact our office for any problems regarding her home sleep test study.       [place] Cuenca,RRT,RPSGT, CSE

## 2020-08-12 DIAGNOSIS — E78.5 HYPERLIPIDEMIA, UNSPECIFIED HYPERLIPIDEMIA TYPE: ICD-10-CM

## 2020-08-12 RX ORDER — ATORVASTATIN CALCIUM 20 MG/1
20 TABLET, FILM COATED ORAL DAILY
Qty: 90 TAB | Refills: 3 | Status: SHIPPED | OUTPATIENT
Start: 2020-08-12 | End: 2021-07-30

## 2020-08-13 ENCOUNTER — TELEPHONE (OUTPATIENT)
Dept: SLEEP MEDICINE | Age: 45
End: 2020-08-13

## 2020-08-13 DIAGNOSIS — G47.00 INSOMNIA, UNSPECIFIED TYPE: ICD-10-CM

## 2020-08-13 DIAGNOSIS — G47.33 OSA (OBSTRUCTIVE SLEEP APNEA): Primary | ICD-10-CM

## 2020-08-13 RX ORDER — TRAZODONE HYDROCHLORIDE 50 MG/1
50 TABLET ORAL
Qty: 90 TAB | Refills: 3 | Status: SHIPPED | OUTPATIENT
Start: 2020-08-13 | End: 2021-08-02

## 2020-08-13 RX ORDER — TRAZODONE HYDROCHLORIDE 50 MG/1
50 TABLET ORAL
Qty: 30 TAB | Refills: 0 | OUTPATIENT
Start: 2020-08-13

## 2020-08-14 NOTE — TELEPHONE ENCOUNTER
Beth Gonzalez is to be contacted by lead sleep technologist regarding results of Sleep Testing which was indicative of an average AHI of 6.9 per hour with an SpO2 sriram of 87% and SpO2 of < 88% being 0.3 minutes. An APAP prescription has been written and patient will be contacted by office staff regarding follow-up  in 2-3 months after initiation of therapy. Encounter Diagnosis   Name Primary?  RERE (obstructive sleep apnea) Yes       Orders Placed This Encounter    AMB SUPPLY ORDER     Diagnosis: Obstructive Sleep Apnea ICD-10 Code (G47.33)    Positive Airway Pressure Therapy: Duration of need: 99 months. ResMed APAP Device with Heated Humidifer [id number] / [id number]. Minimum Pressure: 4 cmH2O, Maximum Pressure: 20 cmH2O.  Nasal Cushion (Replace) 2 per month.  Nasal Interface Mask 1 every 3 months.  Headgear 1 every 6 months.  Filter(s) Disposable 2 per month.  Filter(s) Non-Disposable 1 every 6 months. [address] for Locked [place] (Replace) 1 every 6 months.  Tubing with heating element 1 every 3 months. Perform Mask Fitting per patient preference and comfort - replace as above. Shaunna Patiño MD, FAASM; NPI: [id number]  Electronically signed. 08/13/20

## 2020-08-17 NOTE — TELEPHONE ENCOUNTER
Reviewed sleep study results with patient. She expressed understanding and is willing to proceed with a trial of APAP. Fax DME order & Schedule 1st adherence visit in 60 to 90 days.      Arbie Fothergill Perkins,RRT,RPSGT, CSE

## 2020-08-18 ENCOUNTER — DOCUMENTATION ONLY (OUTPATIENT)
Dept: SLEEP MEDICINE | Age: 45
End: 2020-08-18

## 2020-09-15 ENCOUNTER — TELEPHONE (OUTPATIENT)
Dept: NEUROLOGY | Facility: CLINIC | Age: 45
End: 2020-09-15

## 2020-09-15 NOTE — TELEPHONE ENCOUNTER
Pt calling to see if EMG results can be emailed or loaded in [address] [email or web address]    Has an appt at 1 today and needs them for this. Will come by and  if needed.

## 2020-09-15 NOTE — TELEPHONE ENCOUNTER
Patient called, verified, and stated we can mail her the EMG report, document printed, will mail out 09/16/2020

## 2020-09-25 ENCOUNTER — HOSPITAL ENCOUNTER (EMERGENCY)
Age: 45
Discharge: HOME | End: 2020-09-25
Attending: EMERGENCY MEDICINE | Admitting: EMERGENCY MEDICINE
Payer: COMMERCIAL

## 2020-09-25 ENCOUNTER — APPOINTMENT (OUTPATIENT)
Dept: CT IMAGING | Age: 45
End: 2020-09-25
Attending: EMERGENCY MEDICINE
Payer: COMMERCIAL

## 2020-09-25 VITALS — HEART RATE: 85 BPM | RESPIRATION RATE: 16 BRPM | TEMPERATURE: 97.6 F | OXYGEN SATURATION: 98 %

## 2020-09-25 DIAGNOSIS — R19.7 DIARRHEA, UNSPECIFIED TYPE: Primary | ICD-10-CM

## 2020-09-25 LAB
ALBUMIN SERPL-MCNC: 4.5 G/DL (ref 3.5–5)
ALBUMIN/GLOB SERPL: 1.1 {RATIO} (ref 1.1–2.2)
ALP SERPL-CCNC: 81 U/L (ref 45–117)
ALT SERPL-CCNC: 43 U/L (ref 12–78)
ANION GAP SERPL CALC-SCNC: 7 MMOL/L (ref 5–15)
APPEARANCE UR: CLEAR
AST SERPL-CCNC: 18 U/L (ref 15–37)
BACTERIA URNS QL MICRO: ABNORMAL /HPF
BASOPHILS # BLD: 0.1 K/UL (ref 0–0.1)
BASOPHILS NFR BLD: 1 % (ref 0–1)
BILIRUB SERPL-MCNC: 1.4 MG/DL (ref 0.2–1)
BILIRUB UR QL: NEGATIVE
BUN SERPL-MCNC: 9 MG/DL (ref 6–20)
BUN/CREAT SERPL: 11 (ref 12–20)
CALCIUM SERPL-MCNC: 9.9 MG/DL (ref 8.5–10.1)
CHLORIDE SERPL-SCNC: 108 MMOL/L (ref 97–108)
CO2 SERPL-SCNC: 24 MMOL/L (ref 21–32)
COLOR UR: ABNORMAL
COMMENT, HOLDF: NORMAL
CREAT SERPL-MCNC: 0.79 MG/DL (ref 0.55–1.02)
CRP SERPL-MCNC: <0.29 MG/DL (ref 0–0.6)
DIFFERENTIAL METHOD BLD: ABNORMAL
EOSINOPHIL # BLD: 0.2 K/UL (ref 0–0.4)
EOSINOPHIL NFR BLD: 2 % (ref 0–7)
EPITH CASTS URNS QL MICRO: ABNORMAL /LPF
ERYTHROCYTE [DISTWIDTH] IN BLOOD BY AUTOMATED COUNT: 12.5 % (ref 11.5–14.5)
ERYTHROCYTE [SEDIMENTATION RATE] IN BLOOD: 13 MM/HR (ref 0–20)
GLOBULIN SER CALC-MCNC: 4.1 G/DL (ref 2–4)
GLUCOSE SERPL-MCNC: 99 MG/DL (ref 65–100)
GLUCOSE UR STRIP.AUTO-MCNC: NEGATIVE MG/DL
HCT VFR BLD AUTO: 46.5 % (ref 35–47)
HGB BLD-MCNC: 15.6 G/DL (ref 11.5–16)
HGB UR QL STRIP: NEGATIVE
IMM GRANULOCYTES # BLD AUTO: 0 K/UL (ref 0–0.04)
IMM GRANULOCYTES NFR BLD AUTO: 0 % (ref 0–0.5)
KETONES UR QL STRIP.AUTO: NEGATIVE MG/DL
LEUKOCYTE ESTERASE UR QL STRIP.AUTO: NEGATIVE
LIPASE SERPL-CCNC: 161 U/L (ref 73–393)
LYMPHOCYTES # BLD: 2.3 K/UL (ref 0.8–3.5)
LYMPHOCYTES NFR BLD: 25 % (ref 12–49)
MCH RBC QN AUTO: 28.8 PG (ref 26–34)
MCHC RBC AUTO-ENTMCNC: 33.5 G/DL (ref 30–36.5)
MCV RBC AUTO: 86 FL (ref 80–99)
MONOCYTES # BLD: 0.6 K/UL (ref 0–1)
MONOCYTES NFR BLD: 7 % (ref 5–13)
MUCOUS THREADS URNS QL MICRO: ABNORMAL /LPF
NEUTS SEG # BLD: 6.1 K/UL (ref 1.8–8)
NEUTS SEG NFR BLD: 65 % (ref 32–75)
NITRITE UR QL STRIP.AUTO: NEGATIVE
NRBC # BLD: 0 K/UL (ref 0–0.01)
NRBC BLD-RTO: 0 PER 100 WBC
PH UR STRIP: 5.5 [PH] (ref 5–8)
PLATELET # BLD AUTO: 246 K/UL (ref 150–400)
PMV BLD AUTO: 10 FL (ref 8.9–12.9)
POTASSIUM SERPL-SCNC: 3.8 MMOL/L (ref 3.5–5.1)
PROT SERPL-MCNC: 8.6 G/DL (ref 6.4–8.2)
PROT UR STRIP-MCNC: NEGATIVE MG/DL
RBC # BLD AUTO: 5.41 M/UL (ref 3.8–5.2)
RBC #/AREA URNS HPF: ABNORMAL /HPF (ref 0–5)
SAMPLES BEING HELD,HOLD: NORMAL
SODIUM SERPL-SCNC: 139 MMOL/L (ref 136–145)
SP GR UR REFRACTOMETRY: 1.02 (ref 1–1.03)
UR CULT HOLD, URHOLD: NORMAL
UROBILINOGEN UR QL STRIP.AUTO: 0.2 EU/DL (ref 0.2–1)
WBC # BLD AUTO: 9.3 K/UL (ref 3.6–11)
WBC URNS QL MICRO: ABNORMAL /HPF (ref 0–4)

## 2020-09-25 PROCEDURE — 83690 ASSAY OF LIPASE: CPT

## 2020-09-25 PROCEDURE — 74011000636 HC RX REV CODE- 636: Performed by: RADIOLOGY

## 2020-09-25 PROCEDURE — 74011250636 HC RX REV CODE- 250/636: Performed by: EMERGENCY MEDICINE

## 2020-09-25 PROCEDURE — 85025 COMPLETE CBC W/AUTO DIFF WBC: CPT

## 2020-09-25 PROCEDURE — 80053 COMPREHEN METABOLIC PANEL: CPT

## 2020-09-25 PROCEDURE — 81001 URINALYSIS AUTO W/SCOPE: CPT

## 2020-09-25 PROCEDURE — 36415 COLL VENOUS BLD VENIPUNCTURE: CPT

## 2020-09-25 PROCEDURE — 99283 EMERGENCY DEPT VISIT LOW MDM: CPT

## 2020-09-25 PROCEDURE — 85652 RBC SED RATE AUTOMATED: CPT

## 2020-09-25 PROCEDURE — 74177 CT ABD & PELVIS W/CONTRAST: CPT

## 2020-09-25 PROCEDURE — 86140 C-REACTIVE PROTEIN: CPT

## 2020-09-25 PROCEDURE — 74011000258 HC RX REV CODE- 258: Performed by: RADIOLOGY

## 2020-09-25 RX ORDER — SODIUM CHLORIDE 0.9 % (FLUSH) 0.9 %
10 SYRINGE (ML) INJECTION
Status: COMPLETED | OUTPATIENT
Start: 2020-09-25 | End: 2020-09-25

## 2020-09-25 RX ADMIN — SODIUM CHLORIDE 1000 ML: 9 INJECTION, SOLUTION INTRAVENOUS at 16:57

## 2020-09-25 RX ADMIN — SODIUM CHLORIDE 100 ML: 900 INJECTION, SOLUTION INTRAVENOUS at 18:02

## 2020-09-25 RX ADMIN — IOPAMIDOL 100 ML: 755 INJECTION, SOLUTION INTRAVENOUS at 18:02

## 2020-09-25 RX ADMIN — Medication 10 ML: at 18:02

## 2020-09-25 NOTE — ED NOTES
Patient received in turnover pending CT scan. CT scan without actionable findings. Given description of her symptoms, which have been ongoing for almost 20 years, I suspect she likely has an irritable bowel disease (rather than inflammatory bowel disease) of some kind. Labs reviewed and CT reviewed. I discussed the findings with the patient, who is very upset that we do not have a specific diagnosis at this time. I offered to add another antidiarrheal to her treatment which she declines. I am unclear about which treatment she has had as I do not see any recent GI notes in the system. I have recommended to her that she follow-up with GI and provide the name of another gastroenterologist as she is not satisfied with her current treatment. Patient seems very upset, and during my discharge discussion with her she became derogatory, noting my gender and race in a disparaging manner.

## 2020-09-25 NOTE — ED TRIAGE NOTES
Triage: [facility] since Saturday. She reports the diarrhea is intermittent. She denies vomiting or abdominal pain. Referred for CT scan.

## 2020-09-25 NOTE — ED PROVIDER NOTES
HPI      78-year-old female with a history of gastroparesis, hypercholesterolemia, anxiety, inflammatory bowel disease, and others here with diarrhea. Patient states that she has had intermittent diarrhea for many years since she was 26years of age. She states this episode occurred starting in July and was intermittent. She is currently having consistent diarrhea for about 1 week. She has been feeling lightheaded. She has had 8 loose watery nonbloody stools per day. Positive gagging without vomiting. She last had a colonoscopy in 2017 and had a polyp removed. She states that she had a fever with a [facility]  Dr. Glenys Durant  is her gastroenterologist.  She states she has had many tests for C. difficile and stool studies all of which were negative. She had normal thyroid function tests on August 3. She had previously seen infectious disease last year for the diarrhea as well. [facility] and had labs done showing a white count of 11.6 which is elevated so she was referred to the emergency room for a CT scan. SHX:  . Cat at home. No sick contacts. Past Medical History:   Diagnosis Date    Adverse effect of anesthesia     hard to wake up    Anxiety     Arthritis     back    Asperger syndrome     Asthma     Depression     Gastroparesis     Hypercholesterolemia     IBD (inflammatory bowel disease)     Ill-defined condition     discomfort hips shoulders knees and feet and hands MAURICIO was positive    Irritable bowel disease 1995    Neurological disorder     Psychiatric disorder     anxiety and depression       Past Surgical History:   Procedure Laterality Date    HX HYSTERECTOMY  4/14/16    da Dotty Robotic Total Laparoscopic Hysterectomy with bilateral  salpingectomy more than 250 grams. Cystoscopy.     HX ORTHOPAEDIC      Spinal fusion, back surgery,rods and screws    HX ORTHOPAEDIC      achilles lenghtening    HX ORTHOPAEDIC laminectomy    HX OTHER SURGICAL      tumor removed from nose as child    HX OTHER SURGICAL      dental extraction         Family History:   Problem Relation Age of Onset    Cancer Mother     Heart Disease Father     Depression Father     Cancer Sister     Alzheimer Maternal Grandmother     Diabetes Maternal Grandfather     Heart Surgery Paternal Grandmother     Heart Surgery Paternal Grandfather        Social History     Socioeconomic History    Marital status:      Spouse name: Not on file    Number of children: Not on file    Years of education: Not on file    Highest education level: Not on file   Occupational History    Occupation:  9th and 12th grade   Social Needs    Financial resource strain: Not on file    Food insecurity     Worry: Not on file     Inability: Not on file   [employer] needs     Medical: Not on file     Non-medical: Not on file   Tobacco Use    Smoking status: Former Smoker     Packs/day: 1.00     Years: 10.00     Pack years: 10.00     Last attempt to quit: 2004     Years since quittin.4    Smokeless tobacco: Never Used   Substance and Sexual Activity    Alcohol use: Not Currently    Drug use: Yes     Types: Marijuana     Comment: 2x/week     Sexual activity: Yes     Partners: Female     Birth control/protection: None   Lifestyle    Physical activity     Days per week: Not on file     Minutes per session: Not on file    Stress: Not on file   Relationships    Social connections     Talks on phone: Not on file     Gets together: Not on file     Attends Denominational service: Not on file     Active member of club or organization: Not on file     Attends meetings of clubs or organizations: Not on file     Relationship status: Not on file    Intimate partner violence     Fear of current or ex partner: Not on file     Emotionally abused: Not on file     Physically abused: Not on file     Forced sexual activity: Not on file   Other Topics Concern     Service Not Asked    Blood Transfusions Not Asked    Caffeine Concern Not Asked    Occupational Exposure Not Asked    Hobby Hazards Not Asked    Sleep Concern Not Asked    Stress Concern Not Asked    Weight Concern Not Asked    Special Diet Not Asked    Back Care Not Asked    Exercise Not Asked    Bike Helmet Not Asked   [address] Not Asked    Self-Exams Not Asked   Social History Narrative    Lives with wife.  in 2013. Together since 2004.     1 cat. ALLERGIES: Latex; Sulfa (sulfonamide antibiotics); Morphine; Neosporin [benzalkonium chloride]; and Adhesive tape-silicones    Review of Systems   Constitutional: Negative for chills and fever. Gastrointestinal: Positive for abdominal pain (cramping), diarrhea and nausea. Negative for blood in stool and vomiting. All other systems reviewed and are negative. Vitals:    09/25/20 1603   Pulse: 85   Resp: 16   Temp: 97.6 °F (36.4 °C)   SpO2: 98%            Physical Exam     Nursing note and vitals reviewed. Constitutional: appears well-developed and well-nourished. No distress. HENT:   Head: Normocephalic and atraumatic. Sclera anicteric. Nose: No rhinorrhea. Mouth/Throat: Oropharynx is clear and MILDLY DRY. Pharynx normal  Eyes: Conjunctivae are normal. Right eye exhibits no discharge. Left eye exhibits no discharge. No scleral icterus. Neck: Painless normal range of motion. Supple  Cardiovascular: Normal rate, regular rhythm, normal heart sounds and intact distal pulses. Exam reveals no gallop and no friction rub. No murmur heard. Pulmonary/Chest: Effort normal and breath sounds normal. No respiratory distress. no wheezes. no rales. Abdominal: Soft. Exhibits no distension and no mass. MILD EPIGASTRIC PAIN. No guarding. Musculoskeletal: Normal range of motion. no tenderness. No edema  Lymphadenopathy:   No cervical adenopathy. Neurological:  Alert and oriented.  AMBULATORY WITH NORMAL GAIT. Moving all extremities. Skin: Skin is warm and dry. No rash noted. No pallor. MDM   52-year-old female with a history of chronic diarrhea now much worse over the last week. She reports having diagnosis of inflammatory bowel disease when much younger but currently does not take any medications for such. Followed by GI and has even seen ID for the diarrhea. No sick contacts. Afebrile. She feels lightheaded. Patient [facility] and referred to the emergency room for CT scan. Differential diagnosis includes inflammatory bowel disease, is   enteric pathogens, electrolyte abnormality, and others. Check labs, give ivf, ct abd pelvis. Pt declines stool studies and states she has had many c.diff and stool tests which are negative in the past.    Procedures        Patient received in turnover pending CT results. CT results are nonactionable. Please see separate progress note for discharge course. Disposition: Discharge  Follow-up plan: Follow-up with primary care, name of a new gastroenterologist was given to the patient as she is not satisfied with her current care team.  Continue symptomatic care. Patient declines adding new antidiarrheal such as Lomotil to her current regimen.

## 2020-10-12 ENCOUNTER — OFFICE VISIT (OUTPATIENT)
Dept: INTERNAL MEDICINE CLINIC | Age: 45
End: 2020-10-12
Payer: COMMERCIAL

## 2020-10-12 VITALS
SYSTOLIC BLOOD PRESSURE: 140 MMHG | TEMPERATURE: 97.3 F | HEART RATE: 76 BPM | BODY MASS INDEX: 28.01 KG/M2 | WEIGHT: 184.8 LBS | RESPIRATION RATE: 16 BRPM | HEIGHT: 68 IN | OXYGEN SATURATION: 97 % | DIASTOLIC BLOOD PRESSURE: 90 MMHG

## 2020-10-12 DIAGNOSIS — Z00.01 ENCOUNTER FOR GENERAL ADULT MEDICAL EXAMINATION WITH ABNORMAL FINDINGS: Primary | ICD-10-CM

## 2020-10-12 DIAGNOSIS — R79.89 ELEVATED LFTS: ICD-10-CM

## 2020-10-12 DIAGNOSIS — I89.8 CALCIFIED LYMPH NODES: ICD-10-CM

## 2020-10-12 DIAGNOSIS — R73.9 HYPERGLYCEMIA: ICD-10-CM

## 2020-10-12 DIAGNOSIS — R03.0 ELEVATED BP WITHOUT DIAGNOSIS OF HYPERTENSION: ICD-10-CM

## 2020-10-12 PROBLEM — I10 HTN (HYPERTENSION): Status: ACTIVE | Noted: 2020-10-12

## 2020-10-12 PROBLEM — K31.84 GASTROPARESIS: Status: ACTIVE | Noted: 2020-10-12

## 2020-10-12 PROBLEM — G45.9 TRANSIENT ISCHEMIC ATTACK: Status: ACTIVE | Noted: 2020-10-12

## 2020-10-12 PROBLEM — R74.8 ELEVATED LIVER ENZYMES: Status: ACTIVE | Noted: 2020-10-12

## 2020-10-12 PROBLEM — K75.81 NONALCOHOLIC STEATOHEPATITIS (NASH): Status: ACTIVE | Noted: 2020-10-12

## 2020-10-12 PROCEDURE — 99396 PREV VISIT EST AGE 40-64: CPT | Performed by: NURSE PRACTITIONER

## 2020-10-12 RX ORDER — SERTRALINE HYDROCHLORIDE 100 MG/1
TABLET, FILM COATED ORAL DAILY
COMMUNITY
End: 2021-01-29

## 2020-10-12 RX ORDER — PANTOPRAZOLE SODIUM 20 MG/1
1 TABLET, DELAYED RELEASE ORAL
COMMUNITY
Start: 2020-07-17 | End: 2021-02-17

## 2020-10-12 NOTE — PROGRESS NOTES
Subjective:      Ree Lizarraga is a 41 y.o. female who presents today for CPE. Labs done over the summer. She states [place] sold [place] con[place] [facility] [place] [facility] [place]. She has a new hobby of making miniatures. She has been seeing functional medicine with Carmen Lobato. It has suggested that she has a histamine problem. She has been prescribed a paleo diet and then low histamine diet. She has a follow with hepatology in the next month. Health Maintenance  Immunizations:    Influenza: not UTD -declined  Tetanus: up to date. Gardasil: n/a. Cancer screening:   Cervical: hysterectomy in 2016, reviewed guidelines. Breast: reviewed guidelines, reviewed SBE with her    BP Readings from Last 3 Encounters:   10/12/20 (!) 140/90   04/30/20 113/74   10/18/19 (!) 130/93     Gastroparesis: she was diagnosed with Dr. Zander Graham, she has persistent diarrhea. She was told to get another colonoscopy but she had this done in 2017 and it was normal. She does not want to proceed with this. Calcified lymph nodes: found on MRI in April 2020. Will refer to interventional radiology to biopsy. She was cleared by infectious disease. Patient Care Team:  Skiff, Gaston Nab, NP as PCP - General (Nurse Practitioner)  Santhosh Washburn NP as PCP - [facility]  Francis Moreira MD (Gynecology)       The following sections were reviewed & updated as appropriate: PMH, PSH, FH, and SH.     Patient Active Problem List    Diagnosis Date Noted    Elevated liver enzymes 10/12/2020    Gastroparesis 10/12/2020    HTN (hypertension) 71/57/1620    Nonalcoholic steatohepatitis (DIAZ) 10/12/2020    Transient ischemic attack 10/12/2020    NAFL (nonalcoholic fatty liver) 85/90/8520    Acquired hypothyroidism 10/08/2019    Obese 08/27/2019    Hypercalcemia 06/19/2019    Abnormal mammogram 12/19/2017    PTSD (post-traumatic stress disorder) 11/29/2017    Irritable bowel syndrome with diarrhea 11/29/2017    Elevated BP without diagnosis of hypertension 09/18/2017    Arthralgia 08/19/2016    Hyperlipidemia 08/19/2016    Vitamin D deficiency 08/19/2016    Anxiety 08/19/2016    DDD (degenerative disc disease), lumbar 08/19/2016    Mild intermittent asthma without complication 45/09/5861    Fibroids 03/23/2016     Current Outpatient Medications   Medication Sig Dispense Refill    pantoprazole (PROTONIX) 20 mg tablet Take 1 Tab by mouth daily.  sertraline (ZOLOFT) 100 mg tablet Take  by mouth daily.  traZODone (DESYREL) 50 mg tablet Take 1 Tab by mouth nightly. 90 Tab 3    atorvastatin (LIPITOR) 20 mg tablet Take 1 Tab by mouth daily. 90 Tab 3    LORazepam (ATIVAN) 1 mg tablet Take 1 Tab by mouth two (2) times a day. Max Daily Amount: 2 mg. Prn anxiety. DO NOT USE DAILY  Indications: anxious 30 Tab 0    albuterol (PROVENTIL HFA, VENTOLIN HFA, PROAIR HFA) 90 mcg/actuation inhaler Take 1 Puff by inhalation every six (6) hours as needed for Wheezing. 1 Inhaler 2    diclofenac (VOLTAREN) 1 % gel Apply 2 g to affected area four (4) times daily. 100 g 2    aspirin delayed-release 81 mg tablet Take  by mouth daily.  CETIRIZINE HCL (ZYRTEC PO) Take  by mouth as needed.        Allergies   Allergen Reactions    Latex Rash     cellulitis    Sulfa (Sulfonamide Antibiotics) Anaphylaxis    Morphine Rash    Neomycin-Bacitracin-Polymyxin Other (comments)    Neosporin [Benzalkonium Chloride] Rash    Adhesive Tape-Silicones Rash     Past Medical History:   Diagnosis Date    Adverse effect of anesthesia     hard to wake up    Anxiety     Arthritis     back    Asperger syndrome     Asthma     Depression     Gastroparesis     Hypercholesterolemia     IBD (inflammatory bowel disease)     Ill-defined condition     discomfort hips shoulders knees and feet and hands MAURICIO was positive    Irritable bowel disease 1995    Neurological disorder     Psychiatric disorder     anxiety and depression     Past Surgical History:   Procedure Laterality Date    HX HYSTERECTOMY  4/14/16    da Dotty Robotic Total Laparoscopic Hysterectomy with bilateral  salpingectomy more than 250 grams. Cystoscopy.  HX ORTHOPAEDIC      Spinal fusion, back surgery,rods and screws    HX ORTHOPAEDIC      achilles lenghtening    HX ORTHOPAEDIC      laminectomy    HX OTHER SURGICAL      tumor removed from nose as child    HX OTHER SURGICAL      dental extraction        Review of Systems    A comprehensive review of systems was negative except for that written in the HPI. Objective:     Visit Vitals  BP (!) 140/90 (BP 1 Location: Left arm, BP Patient Position: Sitting)   Pulse 76   Temp 97.3 °F (36.3 °C) (Temporal)   Resp 16   Ht 5' 8\" (1.727 m)   Wt 184 lb 12.8 oz (83.8 kg)   LMP 03/18/2016   SpO2 97%   BMI 28.10 kg/m²     General:  Alert, cooperative, no distress, appears stated age. Head:  Normocephalic, without obvious abnormality, atraumatic. Eyes:  Conjunctivae/corneas clear. PERRL, EOMs intact. Fundi benign. Ears:  Normal TMs and external ear canals both ears. Throat: Deferred   Neck: Supple, symmetrical, trachea midline, no adenopathy, thyroid: no enlargement/tenderness/nodules, no carotid bruit and no JVD. Back:   Symmetric, no curvature. ROM normal. No CVA tenderness. Lungs:   Clear to auscultation bilaterally. Chest wall:  No tenderness or deformity. Heart:  Regular rate and rhythm, S1, S2 normal, no murmur, click, rub or gallop. Breast Exam:  No tenderness, masses, or nipple abnormality. Abdomen:   Soft, non-tender. Bowel sounds normal. No masses,  No organomegaly. Extremities: Extremities normal, atraumatic, no cyanosis or edema. Pulses: 2+ and symmetric all extremities. Skin: Skin color, texture, turgor normal. No rashes or lesions. Lymph nodes: Cervical, supraclavicular, and axillary nodes normal.   Neurologic: CNII-XII intact. Normal strength, sensation throughout.      Nursing note and vitals reviewed  Assessment/Plan:     1. Encounter for general adult medical examination with abnormal findings  - HM updated, ROS took of majority of visit. 2. Hyperglycemia  - HEMOGLOBIN A1C W/O EAG    3. Elevated LFTs  - METABOLIC PANEL, COMPREHENSIVE    4. Calcified lymph nodes  - REFERRAL TO INTERVENTIONAL RADIOLOGY    5. Elevated BP without diagnosis of hypertension  - she will check readings at home and message this week. Follow-up and Dispositions    · Return in about 3 months (around 1/12/2021) for Hypertension. Message me your BP readings this week . Advised her to call back or return to office if symptoms worsen/change/persist.  Discussed expected course/resolution/complications of diagnosis in detail with patient. Medication risks/benefits/costs/interactions/alternatives discussed with patient. She was given an after visit summary which includes diagnoses, current medications, & vitals. She expressed understanding with the diagnosis and plan.

## 2020-10-13 ENCOUNTER — TELEPHONE (OUTPATIENT)
Dept: INTERNAL MEDICINE CLINIC | Age: 45
End: 2020-10-13

## 2020-10-13 ENCOUNTER — TRANSCRIBE ORDER (OUTPATIENT)
Dept: SCHEDULING | Age: 45
End: 2020-10-13

## 2020-10-13 DIAGNOSIS — I89.8 CALCIFIED LYMPH NODES: Primary | ICD-10-CM

## 2020-10-13 LAB
ALBUMIN SERPL-MCNC: 4.7 G/DL (ref 3.8–4.8)
ALBUMIN/GLOB SERPL: 2 {RATIO} (ref 1.2–2.2)
ALP SERPL-CCNC: 87 IU/L (ref 39–117)
ALT SERPL-CCNC: 36 IU/L (ref 0–32)
AST SERPL-CCNC: 22 IU/L (ref 0–40)
BILIRUB SERPL-MCNC: 0.7 MG/DL (ref 0–1.2)
BUN SERPL-MCNC: 10 MG/DL (ref 6–24)
BUN/CREAT SERPL: 13 (ref 9–23)
CALCIUM SERPL-MCNC: 10.2 MG/DL (ref 8.7–10.2)
CHLORIDE SERPL-SCNC: 103 MMOL/L (ref 96–106)
CO2 SERPL-SCNC: 22 MMOL/L (ref 20–29)
CREAT SERPL-MCNC: 0.8 MG/DL (ref 0.57–1)
GLOBULIN SER CALC-MCNC: 2.4 G/DL (ref 1.5–4.5)
GLUCOSE SERPL-MCNC: 98 MG/DL (ref 65–99)
HBA1C MFR BLD: 5.6 % (ref 4.8–5.6)
POTASSIUM SERPL-SCNC: 4.2 MMOL/L (ref 3.5–5.2)
PROT SERPL-MCNC: 7.1 G/DL (ref 6–8.5)
SODIUM SERPL-SCNC: 140 MMOL/L (ref 134–144)

## 2020-10-13 NOTE — TELEPHONE ENCOUNTER
Informed Anabella Rosas NP that Dr Michelle Hayden IR at [facility] reviewed the breast MRI and states there isn't anything to biopsy. Jessica Morris will discuss with the Pt.

## 2020-10-20 ENCOUNTER — TRANSCRIBE ORDER (OUTPATIENT)
Dept: SCHEDULING | Age: 45
End: 2020-10-20

## 2020-10-20 DIAGNOSIS — R10.13 EPIGASTRIC ABDOMINAL PAIN: ICD-10-CM

## 2020-10-20 DIAGNOSIS — K76.9 LIVER LESION: ICD-10-CM

## 2020-10-20 DIAGNOSIS — R19.7 DIARRHEA: Primary | ICD-10-CM

## 2020-10-20 DIAGNOSIS — R79.89 ELEVATED FERRITIN LEVEL: Primary | ICD-10-CM

## 2020-10-20 DIAGNOSIS — K31.84 GASTROPARESIS: ICD-10-CM

## 2020-10-20 DIAGNOSIS — K31.84 GASTROPARESIS: Primary | ICD-10-CM

## 2020-11-09 ENCOUNTER — TELEPHONE (OUTPATIENT)
Dept: SLEEP MEDICINE | Age: 45
End: 2020-11-09

## 2020-11-09 NOTE — TELEPHONE ENCOUNTER
Per [facility] DME, pt has not received PAP device despite numerous calls to deliver. Please reschedule patient.     Zoraida Cuenca,RRT,RPSGT, CSE

## 2020-11-10 ENCOUNTER — TELEPHONE (OUTPATIENT)
Dept: SLEEP MEDICINE | Age: 45
End: 2020-11-10

## 2020-11-10 NOTE — TELEPHONE ENCOUNTER
LVM to notify the patient that we are canceling her adherence visit. As she has not gotten her PAP device as of yet.

## 2020-12-14 ENCOUNTER — VIRTUAL VISIT (OUTPATIENT)
Dept: ONCOLOGY | Age: 45
End: 2020-12-14
Payer: COMMERCIAL

## 2020-12-14 DIAGNOSIS — R79.89 ELEVATED FERRITIN LEVEL: Primary | ICD-10-CM

## 2020-12-14 DIAGNOSIS — K75.81 NONALCOHOLIC STEATOHEPATITIS (NASH): ICD-10-CM

## 2020-12-14 DIAGNOSIS — K76.0 NAFL (NONALCOHOLIC FATTY LIVER): ICD-10-CM

## 2020-12-14 DIAGNOSIS — K58.0 IRRITABLE BOWEL SYNDROME WITH DIARRHEA: ICD-10-CM

## 2020-12-14 DIAGNOSIS — K31.84 GASTROPARESIS: ICD-10-CM

## 2020-12-14 DIAGNOSIS — I10 ESSENTIAL HYPERTENSION: ICD-10-CM

## 2020-12-14 DIAGNOSIS — R79.89 ELEVATED FERRITIN LEVEL: ICD-10-CM

## 2020-12-14 PROCEDURE — 99243 OFF/OP CNSLTJ NEW/EST LOW 30: CPT | Performed by: INTERNAL MEDICINE

## 2021-01-04 DIAGNOSIS — I89.8 CALCIFIED LYMPH NODES: Primary | ICD-10-CM

## 2021-01-11 ENCOUNTER — DOCUMENTATION ONLY (OUTPATIENT)
Dept: ONCOLOGY | Age: 46
End: 2021-01-11

## 2021-01-11 NOTE — PROGRESS NOTES
This pt was heme  Did not do labs we ordered from last visit  Fine to move her fu virtual visit out for a month

## 2021-01-12 ENCOUNTER — TELEPHONE (OUTPATIENT)
Dept: INTERNAL MEDICINE CLINIC | Age: 46
End: 2021-01-12

## 2021-01-12 NOTE — TELEPHONE ENCOUNTER
----- Message from Liset Manning sent at 1/12/2021  8:19 AM EST -----  Regarding: FW: Visit Follow-Up Question  Contact: [phone]  Julianne Chavarria Pls call her, she has an order for an US and needs assistance  ----- Message -----  From: Liset Manning  Sent: 1/6/2021   3:13 PM EST  To: Cecelia Sterling, Rosetta Chauhan, Asha Judd, #  Subject: FW: Visit Follow-Up Question                       ----- Message -----  From: Janna Olivera  Sent: 1/6/2021   2:07 PM EST  To: [facility]  Subject: Visit Follow-Up Question                         Central scheduling just called and said to give you a call because they were unable to schedule. This is easier than calling though :)

## 2021-01-14 ENCOUNTER — HOSPITAL ENCOUNTER (OUTPATIENT)
Dept: MRI IMAGING | Age: 46
Discharge: HOME | End: 2021-01-14
Attending: INTERNAL MEDICINE
Payer: COMMERCIAL

## 2021-01-14 DIAGNOSIS — R19.7 DIARRHEA: ICD-10-CM

## 2021-01-14 DIAGNOSIS — K31.84 GASTROPARESIS: ICD-10-CM

## 2021-01-14 DIAGNOSIS — K76.9 LIVER LESION: ICD-10-CM

## 2021-01-14 DIAGNOSIS — R10.13 EPIGASTRIC ABDOMINAL PAIN: ICD-10-CM

## 2021-01-14 PROCEDURE — 74011250636 HC RX REV CODE- 250/636: Performed by: INTERNAL MEDICINE

## 2021-01-14 PROCEDURE — 74183 MRI ABD W/O CNTR FLWD CNTR: CPT

## 2021-01-14 PROCEDURE — 74011000258 HC RX REV CODE- 258: Performed by: INTERNAL MEDICINE

## 2021-01-14 PROCEDURE — A9585 GADOBUTROL INJECTION: HCPCS | Performed by: INTERNAL MEDICINE

## 2021-01-14 RX ORDER — SODIUM CHLORIDE 0.9 % (FLUSH) 0.9 %
10 SYRINGE (ML) INJECTION
Status: COMPLETED | OUTPATIENT
Start: 2021-01-14 | End: 2021-01-14

## 2021-01-14 RX ADMIN — GADOBUTROL 8.5 ML: 604.72 INJECTION INTRAVENOUS at 18:45

## 2021-01-14 RX ADMIN — Medication 10 ML: at 18:45

## 2021-01-14 RX ADMIN — SODIUM CHLORIDE 100 ML: 900 INJECTION, SOLUTION INTRAVENOUS at 18:45

## 2021-01-28 ENCOUNTER — TELEPHONE (OUTPATIENT)
Dept: INTERNAL MEDICINE CLINIC | Age: 46
End: 2021-01-28

## 2021-01-28 NOTE — TELEPHONE ENCOUNTER
----- Message from Louie Gan NP sent at 1/28/2021  2:48 PM EST -----  Regarding: FW: Test Results Question  Contact: [phone]  Can you call Dr Diana Jacques with general surgery to see about getting her in ASAP? Abdominal mass non operable by GI  ----- Message -----  From: Jai Martin: 1/28/2021   2:37 PM EST  To: Louie Gan NP  Subject: FW: Test Results Question                          ----- Message -----  From: Kevin Vazquez  Sent: 1/28/2021   1:20 PM EST  To: Mario Alonso [place]  Subject: Test Results Question                            Dr. Grace Zhu nurse wants you, my GP, to send a referral to a surgeon for a biopsy and removal of the mass in my abdomen oblique muscle. She said that since the mass is in muscle tissue and not in an organ that it wasn't a GI issue. She advised me to move quickly.

## 2021-01-29 ENCOUNTER — OFFICE VISIT (OUTPATIENT)
Dept: SURGERY | Age: 46
End: 2021-01-29
Payer: COMMERCIAL

## 2021-01-29 VITALS
SYSTOLIC BLOOD PRESSURE: 119 MMHG | DIASTOLIC BLOOD PRESSURE: 86 MMHG | WEIGHT: 189.6 LBS | TEMPERATURE: 98.6 F | HEIGHT: 69 IN | BODY MASS INDEX: 28.08 KG/M2 | OXYGEN SATURATION: 98 % | RESPIRATION RATE: 18 BRPM | HEART RATE: 82 BPM

## 2021-01-29 DIAGNOSIS — F43.10 POST-TRAUMATIC STRESS DISORDER, UNSPECIFIED: ICD-10-CM

## 2021-01-29 DIAGNOSIS — M79.89 SOFT TISSUE MASS: Primary | ICD-10-CM

## 2021-01-29 PROBLEM — R22.2 ABDOMINAL WALL MASS: Status: ACTIVE | Noted: 2021-01-29

## 2021-01-29 PROCEDURE — 99204 OFFICE O/P NEW MOD 45 MIN: CPT | Performed by: SURGERY

## 2021-01-29 RX ORDER — SERTRALINE HYDROCHLORIDE 100 MG/1
TABLET, FILM COATED ORAL
Qty: 180 TAB | Refills: 2 | Status: SHIPPED | OUTPATIENT
Start: 2021-01-29 | End: 2021-11-16 | Stop reason: SDUPTHER

## 2021-01-29 NOTE — H&P (VIEW-ONLY)
General Surgery Office Consultation / H & P 
 
CC: Abdominal wall mass History of Present Illness:  
  
Sanjuana Bonilla is a 41 y.o. female who presents with imaging showing abdominal wall mass. 23-year-old female with a history of a hysterectomy done robotically for uterine fibroids. This was apparently taken out in piecemeal.  The patient has chronic IBS and is managed by GI. She had an MRI done to look at her liver which demonstrated a right sided internal oblique small mass. Patient was then referred to us to evaluate. Reports she has some pain on her right side by the trocar but is unable to know for sure if it is us mass or not. She is unable to feel it. The pain is intermittent and dull in nature. No provoking or alleviating factors. Otherwise tolerating a diet with no prior abdominal surgery. Past Medical History:  
Diagnosis Date  Adverse effect of anesthesia   
 hard to wake up  Anxiety  Arthritis   
 back  Asperger syndrome  Asthma  Depression  Gastroparesis  Hypercholesterolemia  IBD (inflammatory bowel disease)  Ill-defined condition   
 discomfort hips shoulders knees and feet and hands MAURICIO was positive  Irritable bowel disease 1995  Neurological disorder  Psychiatric disorder   
 anxiety and depression Past Surgical History:  
Procedure Laterality Date  HX HYSTERECTOMY  4/14/16  
 da Dotty Robotic Total Laparoscopic Hysterectomy with bilateral  salpingectomy more than 250 grams. Cystoscopy.  HX ORTHOPAEDIC Spinal fusion, back surgery,rods and screws  HX ORTHOPAEDIC    
 achilles lenghtening  HX ORTHOPAEDIC    
 laminectomy  HX OTHER SURGICAL    
 tumor removed from nose as child  HX OTHER SURGICAL    
 dental extraction Family History Problem Relation Age of Onset  Cancer Mother  Heart Disease Father  Depression Father  Cancer Sister  Alzheimer Maternal Grandmother  Diabetes Maternal Grandfather  Heart Surgery Paternal Grandmother  Heart Surgery Paternal Grandfather Social History Socioeconomic History  Marital status:  Spouse name: Not on file  Number of children: Not on file  Years of education: Not on file  Highest education level: Not on file Occupational History  Occupation:  9th and 12th grade Tobacco Use  Smoking status: Former Smoker Packs/day: 1.00 Years: 10.00 Pack years: 10.00 Quit date: 2004 Years since quittin.8  Smokeless tobacco: Never Used Substance and Sexual Activity  Alcohol use: Not Currently  Drug use: Yes Types: Marijuana Comment: 2x/week  Sexual activity: Yes  
  Partners: Female Birth control/protection: None Other Topics Concern Social History Narrative Lives with wife.  in 2013. Together since .   
 1 cat. Prior to Admission medications Medication Sig Start Date End Date Taking? Authorizing Provider  
sertraline (ZOLOFT) 100 mg tablet TAKE 2 TABLETS BY MOUTH EVERY DAY 21  Yes Skiff, Jacquie Shope, NP  
OTHER Immunotherapy shots   Yes Provider, Historical  
pantoprazole (PROTONIX) 20 mg tablet Take 1 Tab by mouth daily. 20  Yes Provider, Historical  
traZODone (DESYREL) 50 mg tablet Take 1 Tab by mouth nightly. 20  Yes Skiff, Jacquie Shope, NP  
atorvastatin (LIPITOR) 20 mg tablet Take 1 Tab by mouth daily. 20  Yes Skiff, Jacquie Shope, NP  
LORazepam (ATIVAN) 1 mg tablet Take 1 Tab by mouth two (2) times a day. Max Daily Amount: 2 mg. Prn anxiety. DO NOT USE DAILY  Indications: anxious 8/10/20  Yes Skiff, Jacquie Shope, NP  
albuterol (PROVENTIL HFA, VENTOLIN HFA, PROAIR HFA) 90 mcg/actuation inhaler Take 1 Puff by inhalation every six (6) hours as needed for Wheezing.  20  Yes Skiff, Jacquie Shope, NP  
 diclofenac (VOLTAREN) 1 % gel Apply 2 g to affected area four (4) times daily. 10/8/19  Yes Skiff, Margretta Nest, NP  
aspirin delayed-release 81 mg tablet Take  by mouth daily. Yes Provider, Historical  
CETIRIZINE HCL (ZYRTEC PO) Take  by mouth as needed. Yes Provider, Historical  
 
Allergies Allergen Reactions  Latex Rash  
  cellulitis  Sulfa (Sulfonamide Antibiotics) Anaphylaxis  Morphine Rash  Neomycin-Bacitracin-Polymyxin Other (comments)  Neosporin [Benzalkonium Chloride] Rash  Adhesive Tape-Silicones Rash Review of Systems: 
Constitutional: No fever or chills Neurologic: No headache Eyes: No scleral icterus or irritated eyes Nose: No nasal pain or drainage Mouth: No oral lesions or sore throat Cardiac: No palpations or chest pain Pulmonary: No cough or shortness or breath Gastrointestinal: Abdominal pain Genitourinary: No dysuria Musculoskeletal: No muscle or joint tenderness Skin: No rashes or lesions Psychiatric: No anxiety or depressed mood Physical Exam:  
 
Visit Vitals /86 Pulse 82 Temp 98.6 °F (37 °C) (Oral) Resp 18 Ht 5' 9\" (1.753 m) Wt 189 lb 9.6 oz (86 kg) SpO2 98% BMI 28.00 kg/m² General: No acute distress, conversant Eyes: PERRLA, no scleral icterus HENT: Normocephalic without oral lesions Neck: Trachea midline without LAD Cardiac: Normal pulse rate and rhythm Pulmonary: Symmetric chest rise with normal effort GI: Soft, NT, ND, no hernia, no splenomegaly, robotic trocar sites noted. No mass palpated on the right Skin: Warm without rash Extremities: No edema or joint stiffness Psych: Appropriate mood and affect Assessment:  
 
18-year-old female with incidental right muscular mass possibly endometrioma Plan: Discussed with the patient the options for this mass. It is isolated and intramuscular. Options include performing a radiographic guided biopsy versus serial monitoring with imaging versus excision. The patient would like the mass excised because she thinks it may be causing her pain. It appears on the MRI this is most easily seen from intra-abdominal.  I have recommended a diagnostic laparoscopy and excision of mass. We discussed the risks and benefits to include bleeding, infection, possible abdominal wall hernia possibly requiring mesh repair, and the risk of anesthesia which include MI, stroke, death. Patient understands these risks and would like to proceed with surgery at the nearest convenience. Total time involved with this patient's care was: 45 minutes, of which >50% of the time was spent counciling the patient. Signed By: Eladio Peter MD 
Bariatric and General Surgeon [facility] January 29, 2021

## 2021-01-29 NOTE — LETTER
1/29/2021 Patient: Vonnie Mckeon  
YOB: 1975 Date of Visit: 1/29/2021 Neo Arizmendi NP 
[address] [place] [phone] [address] [employer] Dear Neo Arizmendi NP, Thank you for referring Ms. Angelique Trotter to [facility] for evaluation. My notes for this consultation are attached. If you have questions, please do not hesitate to call me. I look forward to following your patient along with you.  
 
 
Sincerely, 
 
Edna Eubanks MD

## 2021-01-29 NOTE — PROGRESS NOTES
1. Have you been to the ER, urgent care clinic since your last visit? Hospitalized since your last visit? no    2. Have you seen or consulted any other health care providers outside of the [address] [place] [address] since your last visit? Include any pap smears or colon screening.  no

## 2021-01-29 NOTE — PROGRESS NOTES
General Surgery Office Consultation / H & P    CC: Abdominal wall mass  History of Present Illness:      Karrie Oconnell is a 41 y.o. female who presents with imaging showing abdominal wall mass. 71-year-old female with a history of a hysterectomy done robotically for uterine fibroids. This was apparently taken out in piecemeal.  The patient has chronic IBS and is managed by GI. She had an MRI done to look at her liver which demonstrated a right sided internal oblique small mass. Patient was then referred to us to evaluate. Reports she has some pain on her right side by the trocar but is unable to know for sure if it is us mass or not. She is unable to feel it. The pain is intermittent and dull in nature. No provoking or alleviating factors. Otherwise tolerating a diet with no prior abdominal surgery. Past Medical History:   Diagnosis Date    Adverse effect of anesthesia     hard to wake up    Anxiety     Arthritis     back    Asperger syndrome     Asthma     Depression     Gastroparesis     Hypercholesterolemia     IBD (inflammatory bowel disease)     Ill-defined condition     discomfort hips shoulders knees and feet and hands MAURICIO was positive    Irritable bowel disease 1995    Neurological disorder     Psychiatric disorder     anxiety and depression     Past Surgical History:   Procedure Laterality Date    HX HYSTERECTOMY  4/14/16    da Dotty Robotic Total Laparoscopic Hysterectomy with bilateral  salpingectomy more than 250 grams. Cystoscopy.     HX ORTHOPAEDIC      Spinal fusion, back surgery,rods and screws    HX ORTHOPAEDIC      achilles lenghtening    HX ORTHOPAEDIC      laminectomy    HX OTHER SURGICAL      tumor removed from nose as child    HX OTHER SURGICAL      dental extraction      Family History   Problem Relation Age of Onset    Cancer Mother     Heart Disease Father     Depression Father     Cancer Sister     Alzheimer Maternal Grandmother     Diabetes Maternal Grandfather     Heart Surgery Paternal Grandmother     Heart Surgery Paternal Grandfather      Social History     Socioeconomic History    Marital status:      Spouse name: Not on file    Number of children: Not on file    Years of education: Not on file    Highest education level: Not on file   Occupational History    Occupation:  9th and 12th grade   Tobacco Use    Smoking status: Former Smoker     Packs/day: 1.00     Years: 10.00     Pack years: 10.00     Quit date: 2004     Years since quittin.8    Smokeless tobacco: Never Used   Substance and Sexual Activity    Alcohol use: Not Currently    Drug use: Yes     Types: Marijuana     Comment: 2x/week     Sexual activity: Yes     Partners: Female     Birth control/protection: None   Other Topics Concern   Social History Narrative    Lives with wife.  in 2013. Together since .     1 cat. Prior to Admission medications    Medication Sig Start Date End Date Taking? Authorizing Provider   sertraline (ZOLOFT) 100 mg tablet TAKE 2 TABLETS BY MOUTH EVERY DAY 21  Yes Skiff, Render Ates, NP   OTHER Immunotherapy shots   Yes Provider, Historical   pantoprazole (PROTONIX) 20 mg tablet Take 1 Tab by mouth daily. 20  Yes Provider, Historical   traZODone (DESYREL) 50 mg tablet Take 1 Tab by mouth nightly. 20  Yes Skiff, Render Ates, NP   atorvastatin (LIPITOR) 20 mg tablet Take 1 Tab by mouth daily. 20  Yes Skiff, Render Ates, NP   LORazepam (ATIVAN) 1 mg tablet Take 1 Tab by mouth two (2) times a day. Max Daily Amount: 2 mg. Prn anxiety. DO NOT USE DAILY  Indications: anxious 8/10/20  Yes Skiff, Render Ates, NP   albuterol (PROVENTIL HFA, VENTOLIN HFA, PROAIR HFA) 90 mcg/actuation inhaler Take 1 Puff by inhalation every six (6) hours as needed for Wheezing. 20  Yes Skiff, Render Ates, NP   diclofenac (VOLTAREN) 1 % gel Apply 2 g to affected area four (4) times daily.  10/8/19 Yes Skiff, Helaine Bowers, NP   aspirin delayed-release 81 mg tablet Take  by mouth daily. Yes Provider, Historical   CETIRIZINE HCL (ZYRTEC PO) Take  by mouth as needed. Yes Provider, Historical     Allergies   Allergen Reactions    Latex Rash     cellulitis    Sulfa (Sulfonamide Antibiotics) Anaphylaxis    Morphine Rash    Neomycin-Bacitracin-Polymyxin Other (comments)    Neosporin [Benzalkonium Chloride] Rash    Adhesive Tape-Silicones Rash       Review of Systems:  Constitutional: No fever or chills  Neurologic: No headache  Eyes: No scleral icterus or irritated eyes  Nose: No nasal pain or drainage  Mouth: No oral lesions or sore throat  Cardiac: No palpations or chest pain  Pulmonary: No cough or shortness or breath  Gastrointestinal: Abdominal pain  Genitourinary: No dysuria  Musculoskeletal: No muscle or joint tenderness  Skin: No rashes or lesions  Psychiatric: No anxiety or depressed mood    Physical Exam:     Visit Vitals  /86   Pulse 82   Temp 98.6 °F (37 °C) (Oral)   Resp 18   Ht 5' 9\" (1.753 m)   Wt 189 lb 9.6 oz (86 kg)   SpO2 98%   BMI 28.00 kg/m²     General: No acute distress, conversant  Eyes: PERRLA, no scleral icterus  HENT: Normocephalic without oral lesions  Neck: Trachea midline without LAD  Cardiac: Normal pulse rate and rhythm  Pulmonary: Symmetric chest rise with normal effort  GI: Soft, NT, ND, no hernia, no splenomegaly, robotic trocar sites noted. No mass palpated on the right  Skin: Warm without rash  Extremities: No edema or joint stiffness  Psych: Appropriate mood and affect    Assessment:     50-year-old female with incidental right muscular mass possibly endometrioma    Plan:     Discussed with the patient the options for this mass. It is isolated and intramuscular. Options include performing a radiographic guided biopsy versus serial monitoring with imaging versus excision. The patient would like the mass excised because she thinks it may be causing her pain.   It appears on the MRI this is most easily seen from intra-abdominal.  I have recommended a diagnostic laparoscopy and excision of mass. We discussed the risks and benefits to include bleeding, infection, possible abdominal wall hernia possibly requiring mesh repair, and the risk of anesthesia which include MI, stroke, death. Patient understands these risks and would like to proceed with surgery at the nearest convenience. Total time involved with this patient's care was: 45 minutes, of which >50% of the time was spent counciling the patient.     Signed By: Naresh Phelan MD  Bariatric and General Surgeon  [facility]    January 29, 2021

## 2021-02-02 LAB
BASOPHILS # BLD AUTO: 0.1 X10E3/UL (ref 0–0.2)
BASOPHILS NFR BLD AUTO: 1 %
EOSINOPHIL # BLD AUTO: 0.2 X10E3/UL (ref 0–0.4)
EOSINOPHIL NFR BLD AUTO: 3 %
ERYTHROCYTE [DISTWIDTH] IN BLOOD BY AUTOMATED COUNT: 12.3 % (ref 11.7–15.4)
FERRITIN SERPL-MCNC: 117 NG/ML (ref 15–150)
HCT VFR BLD AUTO: 44.3 % (ref 34–46.6)
HGB BLD-MCNC: 15.2 G/DL (ref 11.1–15.9)
IMM GRANULOCYTES # BLD AUTO: 0 X10E3/UL (ref 0–0.1)
IMM GRANULOCYTES NFR BLD AUTO: 0 %
IRON SATN MFR SERPL: 21 % (ref 15–55)
IRON SERPL-MCNC: 75 UG/DL (ref 27–159)
LYMPHOCYTES # BLD AUTO: 2.1 X10E3/UL (ref 0.7–3.1)
LYMPHOCYTES NFR BLD AUTO: 27 %
MCH RBC QN AUTO: 29.6 PG (ref 26.6–33)
MCHC RBC AUTO-ENTMCNC: 34.3 G/DL (ref 31.5–35.7)
MCV RBC AUTO: 86 FL (ref 79–97)
MONOCYTES # BLD AUTO: 0.5 X10E3/UL (ref 0.1–0.9)
MONOCYTES NFR BLD AUTO: 6 %
NEUTROPHILS # BLD AUTO: 4.9 X10E3/UL (ref 1.4–7)
NEUTROPHILS NFR BLD AUTO: 63 %
PATH INTERP BLD-IMP: NORMAL
PATH REV BLD -IMP: NORMAL
PATHOLOGIST NAME: NORMAL
PLATELET # BLD AUTO: 276 X10E3/UL (ref 150–450)
RBC # BLD AUTO: 5.14 X10E6/UL (ref 3.77–5.28)
TIBC SERPL-MCNC: 351 UG/DL (ref 250–450)
UIBC SERPL-MCNC: 276 UG/DL (ref 131–425)
WBC # BLD AUTO: 7.8 X10E3/UL (ref 3.4–10.8)

## 2021-02-03 NOTE — PROGRESS NOTES
Tell pt labs from us are normal  She does not need to fu with us  Can fu with PCP and other doctors.

## 2021-02-04 ENCOUNTER — HOSPITAL ENCOUNTER (OUTPATIENT)
Dept: PREADMISSION TESTING | Age: 46
Discharge: HOME | End: 2021-02-04
Payer: COMMERCIAL

## 2021-02-04 VITALS
HEART RATE: 84 BPM | WEIGHT: 189.6 LBS | DIASTOLIC BLOOD PRESSURE: 79 MMHG | BODY MASS INDEX: 28.08 KG/M2 | SYSTOLIC BLOOD PRESSURE: 130 MMHG | TEMPERATURE: 98.8 F | HEIGHT: 69 IN

## 2021-02-04 LAB
BASOPHILS # BLD: 0.1 K/UL (ref 0–0.1)
BASOPHILS NFR BLD: 1 % (ref 0–1)
DIFFERENTIAL METHOD BLD: NORMAL
EOSINOPHIL # BLD: 0.3 K/UL (ref 0–0.4)
EOSINOPHIL NFR BLD: 3 % (ref 0–7)
ERYTHROCYTE [DISTWIDTH] IN BLOOD BY AUTOMATED COUNT: 12.7 % (ref 11.5–14.5)
HCT VFR BLD AUTO: 44.1 % (ref 35–47)
HGB BLD-MCNC: 14.7 G/DL (ref 11.5–16)
IMM GRANULOCYTES # BLD AUTO: 0 K/UL (ref 0–0.04)
IMM GRANULOCYTES NFR BLD AUTO: 0 % (ref 0–0.5)
LYMPHOCYTES # BLD: 2.4 K/UL (ref 0.8–3.5)
LYMPHOCYTES NFR BLD: 26 % (ref 12–49)
MCH RBC QN AUTO: 28.8 PG (ref 26–34)
MCHC RBC AUTO-ENTMCNC: 33.3 G/DL (ref 30–36.5)
MCV RBC AUTO: 86.5 FL (ref 80–99)
MONOCYTES # BLD: 0.7 K/UL (ref 0–1)
MONOCYTES NFR BLD: 7 % (ref 5–13)
NEUTS SEG # BLD: 5.9 K/UL (ref 1.8–8)
NEUTS SEG NFR BLD: 63 % (ref 32–75)
NRBC # BLD: 0 K/UL (ref 0–0.01)
NRBC BLD-RTO: 0 PER 100 WBC
PLATELET # BLD AUTO: 249 K/UL (ref 150–400)
PMV BLD AUTO: 10.2 FL (ref 8.9–12.9)
RBC # BLD AUTO: 5.1 M/UL (ref 3.8–5.2)
WBC # BLD AUTO: 9.3 K/UL (ref 3.6–11)

## 2021-02-04 PROCEDURE — 85025 COMPLETE CBC W/AUTO DIFF WBC: CPT

## 2021-02-04 PROCEDURE — 36415 COLL VENOUS BLD VENIPUNCTURE: CPT

## 2021-02-04 NOTE — PERIOP NOTES
PATIENT GIVEN SURGICAL SITE INFECTION FAQ HANDOUT AND HAND WASHING TIP SHEET. PREOP INSTRUCTIONS REVIEWED AND PATIENT VERBALIZES UNDERSTANDING OF INSTRUCTIONS. PATIENT HAS BEEN GIVEN THE OPPORTUNITY TO ASK QUESTIONS.   COVID AND HIBICLENS INSTRUCTIONS WERE GIVEN TO THE PT.

## 2021-02-08 ENCOUNTER — HOSPITAL ENCOUNTER (OUTPATIENT)
Dept: PREADMISSION TESTING | Age: 46
Discharge: HOME | End: 2021-02-08
Payer: COMMERCIAL

## 2021-02-08 ENCOUNTER — TRANSCRIBE ORDER (OUTPATIENT)
Dept: REGISTRATION | Age: 46
End: 2021-02-08

## 2021-02-08 DIAGNOSIS — Z01.812 PRE-PROCEDURE LAB EXAM: ICD-10-CM

## 2021-02-08 DIAGNOSIS — Z01.812 PRE-PROCEDURE LAB EXAM: Primary | ICD-10-CM

## 2021-02-08 PROCEDURE — U0003 INFECTIOUS AGENT DETECTION BY NUCLEIC ACID (DNA OR RNA); SEVERE ACUTE RESPIRATORY SYNDROME CORONAVIRUS 2 (SARS-COV-2) (CORONAVIRUS DISEASE [COVID-19]), AMPLIFIED PROBE TECHNIQUE, MAKING USE OF HIGH THROUGHPUT TECHNOLOGIES AS DESCRIBED BY CMS-2020-01-R: HCPCS

## 2021-02-09 DIAGNOSIS — F41.9 ANXIETY: ICD-10-CM

## 2021-02-09 LAB — SARS-COV-2, COV2NT: NOT DETECTED

## 2021-02-10 RX ORDER — LORAZEPAM 1 MG/1
TABLET ORAL
Qty: 30 TAB | Refills: 0 | Status: SHIPPED | OUTPATIENT
Start: 2021-02-10 | End: 2021-12-03 | Stop reason: SDUPTHER

## 2021-02-11 ENCOUNTER — ANESTHESIA EVENT (OUTPATIENT)
Dept: SURGERY | Age: 46
End: 2021-02-11
Payer: COMMERCIAL

## 2021-02-12 ENCOUNTER — ANESTHESIA (OUTPATIENT)
Dept: SURGERY | Age: 46
End: 2021-02-12
Payer: COMMERCIAL

## 2021-02-12 ENCOUNTER — HOSPITAL ENCOUNTER (OUTPATIENT)
Age: 46
Setting detail: OUTPATIENT SURGERY
Discharge: HOME | End: 2021-02-12
Attending: SURGERY | Admitting: SURGERY
Payer: COMMERCIAL

## 2021-02-12 VITALS
DIASTOLIC BLOOD PRESSURE: 72 MMHG | HEART RATE: 99 BPM | SYSTOLIC BLOOD PRESSURE: 116 MMHG | RESPIRATION RATE: 16 BRPM | WEIGHT: 189 LBS | OXYGEN SATURATION: 94 % | TEMPERATURE: 98.2 F | BODY MASS INDEX: 27.91 KG/M2

## 2021-02-12 DIAGNOSIS — R22.2 ABDOMINAL WALL MASS: Primary | ICD-10-CM

## 2021-02-12 LAB — HCG UR QL: NEGATIVE

## 2021-02-12 PROCEDURE — 2709999900 HC NON-CHARGEABLE SUPPLY: Performed by: SURGERY

## 2021-02-12 PROCEDURE — 76210000021 HC REC RM PH II 0.5 TO 1 HR: Performed by: SURGERY

## 2021-02-12 PROCEDURE — 77030003666 HC NDL SPINAL BD -A: Performed by: SURGERY

## 2021-02-12 PROCEDURE — 74011000250 HC RX REV CODE- 250: Performed by: NURSE ANESTHETIST, CERTIFIED REGISTERED

## 2021-02-12 PROCEDURE — 49329 UNLSTD LAPS PX ABD PERTM&OMN: CPT | Performed by: SURGERY

## 2021-02-12 PROCEDURE — 77030008684 HC TU ET CUF COVD -B: Performed by: ANESTHESIOLOGY

## 2021-02-12 PROCEDURE — 77030040361 HC SLV COMPR DVT MDII -B: Performed by: SURGERY

## 2021-02-12 PROCEDURE — 74011000250 HC RX REV CODE- 250: Performed by: SURGERY

## 2021-02-12 PROCEDURE — 77030020829: Performed by: SURGERY

## 2021-02-12 PROCEDURE — 77030008477 HC STYL SATN SLP COVD -A: Performed by: ANESTHESIOLOGY

## 2021-02-12 PROCEDURE — 88305 TISSUE EXAM BY PATHOLOGIST: CPT

## 2021-02-12 PROCEDURE — 76060000033 HC ANESTHESIA 1 TO 1.5 HR: Performed by: SURGERY

## 2021-02-12 PROCEDURE — 74011250637 HC RX REV CODE- 250/637: Performed by: ANESTHESIOLOGY

## 2021-02-12 PROCEDURE — 76210000006 HC OR PH I REC 0.5 TO 1 HR: Performed by: SURGERY

## 2021-02-12 PROCEDURE — 74011250636 HC RX REV CODE- 250/636: Performed by: SURGERY

## 2021-02-12 PROCEDURE — 74011250636 HC RX REV CODE- 250/636: Performed by: NURSE ANESTHETIST, CERTIFIED REGISTERED

## 2021-02-12 PROCEDURE — 77030002933 HC SUT MCRYL J&J -A: Performed by: SURGERY

## 2021-02-12 PROCEDURE — 81025 URINE PREGNANCY TEST: CPT

## 2021-02-12 PROCEDURE — 74011250637 HC RX REV CODE- 250/637: Performed by: NURSE ANESTHETIST, CERTIFIED REGISTERED

## 2021-02-12 PROCEDURE — 77030002966 HC SUT PDS J&J -A: Performed by: SURGERY

## 2021-02-12 PROCEDURE — 76010000149 HC OR TIME 1 TO 1.5 HR: Performed by: SURGERY

## 2021-02-12 PROCEDURE — 77030028402 HC SYS LAPSC TISS RETRV AMR -B: Performed by: SURGERY

## 2021-02-12 PROCEDURE — 77030010031 HC SCIS ENDOSC MPLR J&J -C: Performed by: SURGERY

## 2021-02-12 PROCEDURE — 77030010507 HC ADH SKN DERMBND J&J -B: Performed by: SURGERY

## 2021-02-12 PROCEDURE — 77030012770 HC TRCR OPT FX AMR -B: Performed by: SURGERY

## 2021-02-12 PROCEDURE — 74011250636 HC RX REV CODE- 250/636: Performed by: ANESTHESIOLOGY

## 2021-02-12 RX ORDER — LIDOCAINE HYDROCHLORIDE 10 MG/ML
0.1 INJECTION, SOLUTION EPIDURAL; INFILTRATION; INTRACAUDAL; PERINEURAL AS NEEDED
Status: DISCONTINUED | OUTPATIENT
Start: 2021-02-12 | End: 2021-02-12 | Stop reason: HOSPADM

## 2021-02-12 RX ORDER — SODIUM CHLORIDE, SODIUM LACTATE, POTASSIUM CHLORIDE, CALCIUM CHLORIDE 600; 310; 30; 20 MG/100ML; MG/100ML; MG/100ML; MG/100ML
50 INJECTION, SOLUTION INTRAVENOUS CONTINUOUS
Status: DISCONTINUED | OUTPATIENT
Start: 2021-02-12 | End: 2021-02-12 | Stop reason: HOSPADM

## 2021-02-12 RX ORDER — ACETAMINOPHEN 325 MG/1
650 TABLET ORAL ONCE
Status: COMPLETED | OUTPATIENT
Start: 2021-02-12 | End: 2021-02-12

## 2021-02-12 RX ORDER — SODIUM CHLORIDE 0.9 % (FLUSH) 0.9 %
5-40 SYRINGE (ML) INJECTION EVERY 8 HOURS
Status: DISCONTINUED | OUTPATIENT
Start: 2021-02-12 | End: 2021-02-12 | Stop reason: HOSPADM

## 2021-02-12 RX ORDER — HYDROMORPHONE HYDROCHLORIDE 1 MG/ML
0.2 INJECTION, SOLUTION INTRAMUSCULAR; INTRAVENOUS; SUBCUTANEOUS
Status: DISCONTINUED | OUTPATIENT
Start: 2021-02-12 | End: 2021-02-12 | Stop reason: HOSPADM

## 2021-02-12 RX ORDER — DEXAMETHASONE SODIUM PHOSPHATE 4 MG/ML
INJECTION, SOLUTION INTRA-ARTICULAR; INTRALESIONAL; INTRAMUSCULAR; INTRAVENOUS; SOFT TISSUE AS NEEDED
Status: DISCONTINUED | OUTPATIENT
Start: 2021-02-12 | End: 2021-02-12 | Stop reason: HOSPADM

## 2021-02-12 RX ORDER — ROCURONIUM BROMIDE 10 MG/ML
INJECTION, SOLUTION INTRAVENOUS AS NEEDED
Status: DISCONTINUED | OUTPATIENT
Start: 2021-02-12 | End: 2021-02-12 | Stop reason: HOSPADM

## 2021-02-12 RX ORDER — POLYETHYLENE GLYCOL 3350 17 G/17G
17 POWDER, FOR SOLUTION ORAL DAILY
Qty: 14 PACKET | Refills: 1 | Status: SHIPPED | OUTPATIENT
Start: 2021-02-12 | End: 2021-02-12

## 2021-02-12 RX ORDER — BUPIVACAINE HYDROCHLORIDE 5 MG/ML
INJECTION, SOLUTION EPIDURAL; INTRACAUDAL AS NEEDED
Status: DISCONTINUED | OUTPATIENT
Start: 2021-02-12 | End: 2021-02-12 | Stop reason: HOSPADM

## 2021-02-12 RX ORDER — HYDROMORPHONE HYDROCHLORIDE 2 MG/ML
INJECTION, SOLUTION INTRAMUSCULAR; INTRAVENOUS; SUBCUTANEOUS AS NEEDED
Status: DISCONTINUED | OUTPATIENT
Start: 2021-02-12 | End: 2021-02-12 | Stop reason: HOSPADM

## 2021-02-12 RX ORDER — KETOROLAC TROMETHAMINE 30 MG/ML
INJECTION, SOLUTION INTRAMUSCULAR; INTRAVENOUS AS NEEDED
Status: DISCONTINUED | OUTPATIENT
Start: 2021-02-12 | End: 2021-02-12 | Stop reason: HOSPADM

## 2021-02-12 RX ORDER — OXYCODONE AND ACETAMINOPHEN 5; 325 MG/1; MG/1
1-2 TABLET ORAL
Qty: 18 TAB | Refills: 0 | Status: SHIPPED | OUTPATIENT
Start: 2021-02-12 | End: 2021-02-15

## 2021-02-12 RX ORDER — MIDAZOLAM HYDROCHLORIDE 1 MG/ML
1 INJECTION, SOLUTION INTRAMUSCULAR; INTRAVENOUS AS NEEDED
Status: DISCONTINUED | OUTPATIENT
Start: 2021-02-12 | End: 2021-02-12 | Stop reason: HOSPADM

## 2021-02-12 RX ORDER — ALBUTEROL SULFATE 90 UG/1
AEROSOL, METERED RESPIRATORY (INHALATION) AS NEEDED
Status: DISCONTINUED | OUTPATIENT
Start: 2021-02-12 | End: 2021-02-12 | Stop reason: HOSPADM

## 2021-02-12 RX ORDER — MIDAZOLAM HYDROCHLORIDE 1 MG/ML
INJECTION, SOLUTION INTRAMUSCULAR; INTRAVENOUS AS NEEDED
Status: DISCONTINUED | OUTPATIENT
Start: 2021-02-12 | End: 2021-02-12 | Stop reason: HOSPADM

## 2021-02-12 RX ORDER — SODIUM CHLORIDE 0.9 % (FLUSH) 0.9 %
5-40 SYRINGE (ML) INJECTION AS NEEDED
Status: DISCONTINUED | OUTPATIENT
Start: 2021-02-12 | End: 2021-02-12 | Stop reason: HOSPADM

## 2021-02-12 RX ORDER — SUCCINYLCHOLINE CHLORIDE 20 MG/ML
INJECTION INTRAMUSCULAR; INTRAVENOUS AS NEEDED
Status: DISCONTINUED | OUTPATIENT
Start: 2021-02-12 | End: 2021-02-12 | Stop reason: HOSPADM

## 2021-02-12 RX ORDER — FENTANYL CITRATE 50 UG/ML
50 INJECTION, SOLUTION INTRAMUSCULAR; INTRAVENOUS AS NEEDED
Status: DISCONTINUED | OUTPATIENT
Start: 2021-02-12 | End: 2021-02-12 | Stop reason: HOSPADM

## 2021-02-12 RX ORDER — POLYETHYLENE GLYCOL 3350 17 G/17G
17 POWDER, FOR SOLUTION ORAL DAILY
Qty: 14 PACKET | Refills: 1 | Status: SHIPPED | OUTPATIENT
Start: 2021-02-12 | End: 2021-02-17

## 2021-02-12 RX ORDER — PROPOFOL 10 MG/ML
INJECTION, EMULSION INTRAVENOUS AS NEEDED
Status: DISCONTINUED | OUTPATIENT
Start: 2021-02-12 | End: 2021-02-12 | Stop reason: HOSPADM

## 2021-02-12 RX ORDER — ONDANSETRON 2 MG/ML
INJECTION INTRAMUSCULAR; INTRAVENOUS AS NEEDED
Status: DISCONTINUED | OUTPATIENT
Start: 2021-02-12 | End: 2021-02-12 | Stop reason: HOSPADM

## 2021-02-12 RX ORDER — KETAMINE HYDROCHLORIDE 10 MG/ML
INJECTION, SOLUTION INTRAMUSCULAR; INTRAVENOUS AS NEEDED
Status: DISCONTINUED | OUTPATIENT
Start: 2021-02-12 | End: 2021-02-12 | Stop reason: HOSPADM

## 2021-02-12 RX ORDER — ONDANSETRON 2 MG/ML
4 INJECTION INTRAMUSCULAR; INTRAVENOUS AS NEEDED
Status: DISCONTINUED | OUTPATIENT
Start: 2021-02-12 | End: 2021-02-12 | Stop reason: HOSPADM

## 2021-02-12 RX ORDER — FENTANYL CITRATE 50 UG/ML
25 INJECTION, SOLUTION INTRAMUSCULAR; INTRAVENOUS
Status: DISCONTINUED | OUTPATIENT
Start: 2021-02-12 | End: 2021-02-12 | Stop reason: HOSPADM

## 2021-02-12 RX ORDER — OXYCODONE AND ACETAMINOPHEN 5; 325 MG/1; MG/1
1-2 TABLET ORAL
Qty: 18 TAB | Refills: 0 | Status: SHIPPED | OUTPATIENT
Start: 2021-02-12 | End: 2021-02-12

## 2021-02-12 RX ORDER — FENTANYL CITRATE 50 UG/ML
INJECTION, SOLUTION INTRAMUSCULAR; INTRAVENOUS AS NEEDED
Status: DISCONTINUED | OUTPATIENT
Start: 2021-02-12 | End: 2021-02-12 | Stop reason: HOSPADM

## 2021-02-12 RX ORDER — SODIUM CHLORIDE, SODIUM LACTATE, POTASSIUM CHLORIDE, CALCIUM CHLORIDE 600; 310; 30; 20 MG/100ML; MG/100ML; MG/100ML; MG/100ML
INJECTION, SOLUTION INTRAVENOUS
Status: DISCONTINUED | OUTPATIENT
Start: 2021-02-12 | End: 2021-02-12 | Stop reason: HOSPADM

## 2021-02-12 RX ORDER — LIDOCAINE HYDROCHLORIDE 20 MG/ML
INJECTION, SOLUTION EPIDURAL; INFILTRATION; INTRACAUDAL; PERINEURAL AS NEEDED
Status: DISCONTINUED | OUTPATIENT
Start: 2021-02-12 | End: 2021-02-12 | Stop reason: HOSPADM

## 2021-02-12 RX ADMIN — ALBUTEROL SULFATE 2 PUFF: 90 AEROSOL, METERED RESPIRATORY (INHALATION) at 11:38

## 2021-02-12 RX ADMIN — FENTANYL CITRATE 50 MCG: 50 INJECTION, SOLUTION INTRAMUSCULAR; INTRAVENOUS at 10:55

## 2021-02-12 RX ADMIN — WATER 2 G: 1 INJECTION INTRAMUSCULAR; INTRAVENOUS; SUBCUTANEOUS at 11:05

## 2021-02-12 RX ADMIN — SODIUM CHLORIDE, POTASSIUM CHLORIDE, SODIUM LACTATE AND CALCIUM CHLORIDE 50 ML/HR: 600; 310; 30; 20 INJECTION, SOLUTION INTRAVENOUS at 10:00

## 2021-02-12 RX ADMIN — SUCCINYLCHOLINE CHLORIDE 140 MG: 20 INJECTION, SOLUTION INTRAMUSCULAR; INTRAVENOUS at 10:56

## 2021-02-12 RX ADMIN — PROPOFOL 75 MG: 10 INJECTION, EMULSION INTRAVENOUS at 11:35

## 2021-02-12 RX ADMIN — KETOROLAC TROMETHAMINE 30 MG: 30 INJECTION, SOLUTION INTRAMUSCULAR; INTRAVENOUS at 11:29

## 2021-02-12 RX ADMIN — ROCURONIUM BROMIDE 30 MG: 10 SOLUTION INTRAVENOUS at 11:01

## 2021-02-12 RX ADMIN — LIDOCAINE HYDROCHLORIDE 80 MG: 20 INJECTION, SOLUTION EPIDURAL; INFILTRATION; INTRACAUDAL; PERINEURAL at 10:55

## 2021-02-12 RX ADMIN — DEXAMETHASONE SODIUM PHOSPHATE 4 MG: 4 INJECTION, SOLUTION INTRAMUSCULAR; INTRAVENOUS at 11:13

## 2021-02-12 RX ADMIN — FENTANYL CITRATE 50 MCG: 50 INJECTION, SOLUTION INTRAMUSCULAR; INTRAVENOUS at 11:04

## 2021-02-12 RX ADMIN — KETAMINE HYDROCHLORIDE 30 MG: 10 INJECTION, SOLUTION INTRAMUSCULAR; INTRAVENOUS at 11:12

## 2021-02-12 RX ADMIN — HYDROMORPHONE HYDROCHLORIDE 0.5 MG: 2 INJECTION, SOLUTION INTRAMUSCULAR; INTRAVENOUS; SUBCUTANEOUS at 11:33

## 2021-02-12 RX ADMIN — ALBUTEROL SULFATE 2 PUFF: 90 AEROSOL, METERED RESPIRATORY (INHALATION) at 11:46

## 2021-02-12 RX ADMIN — ONDANSETRON HYDROCHLORIDE 4 MG: 2 INJECTION, SOLUTION INTRAMUSCULAR; INTRAVENOUS at 11:26

## 2021-02-12 RX ADMIN — PROPOFOL 160 MG: 10 INJECTION, EMULSION INTRAVENOUS at 10:55

## 2021-02-12 RX ADMIN — MIDAZOLAM 2 MG: 1 INJECTION INTRAMUSCULAR; INTRAVENOUS at 10:50

## 2021-02-12 RX ADMIN — FENTANYL CITRATE 25 MCG: 50 INJECTION INTRAMUSCULAR; INTRAVENOUS at 12:15

## 2021-02-12 RX ADMIN — ALBUTEROL SULFATE 3 PUFF: 90 AEROSOL, METERED RESPIRATORY (INHALATION) at 11:40

## 2021-02-12 RX ADMIN — ACETAMINOPHEN 650 MG: 325 TABLET ORAL at 10:02

## 2021-02-12 RX ADMIN — ROCURONIUM BROMIDE 10 MG: 10 SOLUTION INTRAVENOUS at 11:10

## 2021-02-12 RX ADMIN — SUGAMMADEX 171 MG: 100 INJECTION, SOLUTION INTRAVENOUS at 11:28

## 2021-02-12 RX ADMIN — SODIUM CHLORIDE, POTASSIUM CHLORIDE, SODIUM LACTATE AND CALCIUM CHLORIDE: 600; 310; 30; 20 INJECTION, SOLUTION INTRAVENOUS at 10:52

## 2021-02-12 RX ADMIN — FENTANYL CITRATE 25 MCG: 50 INJECTION INTRAMUSCULAR; INTRAVENOUS at 12:40

## 2021-02-12 RX ADMIN — ROCURONIUM BROMIDE 5 MG: 10 SOLUTION INTRAVENOUS at 10:55

## 2021-02-12 NOTE — ROUTINE PROCESS
Patient: Janee Barrow MRN: [id number]  SSN: [id number] YOB: 1975  Age: 41 y.o. Sex: female Patient is status post Procedure(s): LAPAROSCOPIC  EXCISION OF ABDOMINAL WALL MASS. Surgeon(s) and Role: 
   * Jaida Harkins MD - Primary Local/Dose/Irrigation:  See Bal Hoff Peripheral IV 02/12/21 Left;Posterior Hand (Active) Site Assessment Clean, dry, & intact 02/12/21 0957 Phlebitis Assessment 0 02/12/21 0957 Infiltration Assessment 0 02/12/21 0957 Dressing Status Clean, dry, & intact 02/12/21 0957 Dressing Type Transparent 02/12/21 0957 Hub Color/Line Status Pink 02/12/21 0957 Alcohol Cap Used Yes 02/12/21 0957 Airway - Endotracheal Tube 02/12/21 Oral (Active) Dressing/Packing:  Incision 02/12/21 Abdomen-Dressing/Treatment: Surgical glue (02/12/21 1100) Splint/Cast:  ] Other:

## 2021-02-12 NOTE — INTERVAL H&P NOTE
Update History & Physical 
 
The Patient's History and Physical of 1/29/21 was reviewed with the patient and I examined the patient. There was no change. The surgical site was confirmed by the patient and me. Plan:  The risk, benefits, expected outcome, and alternative to the recommended procedure have been discussed with the patient. Patient understands and wants to proceed with the procedure.  
 
Electronically signed by Benny Albarran MD on 2/12/2021 at 10:07 AM

## 2021-02-12 NOTE — OP NOTES
OPERATIVE NOTE    Date of Procedure: 2/12/2021     Preoperative Diagnosis: ABDOMINAL WALL MASS  Postoperative Diagnosis: ABDOMINAL WALL MASS      Procedure: Procedure(s):  LAPAROSCOPIC  EXCISION OF ABDOMINAL WALL MASS    Surgeon: Himanshu David MD  Assistant(s): Kishore Godinez- They were critically important in the exposure and key portions of the case  Anesthesia: General   Indications: 40 y/o F with unknown abdominal wall mass after previous hysterectomy desiring removal  Findings: 1.5cm internal oblique right sided prior trocar site mass    Description of Operation: Raulito Birch was identified in the pre-operative holding area. Informed consent was obtained after a complete discussion of risks, benefits and alternatives to surgery were had with the patient. The patient was brought back to the operating room and placed under general endotracheal anesthesia on the operating room table in supine position. The patient was then prepped and draped in the usual sterile fashion. A timeout was performed. A left subcostal 5 mm trocar was placed in we safely entered the abdomen using Optiview. We achieved insufflation. We then placed a 5 mm trocar in the mid abdomen along the midclavicular line and another 1 medially in the lower left abdomen. We took down some colonic adhesions to the lateral right wall. Were able to visualize and palpate with our laparoscopic instruments the mass in the right mid abdomen. This appeared to be located near a previous trocar site from hysterectomy. Using the harmonic I incised the peritoneum around this mass creating a 1 cm margin. I then excised the mass from the internal oblique keeping a healthy margin. At the completion of this there was a 3 x 4 cm defect in the internal oblique and peritoneum. I then used 2-0 PDS sutures transfascially with an incision over this area to close this opening in a figure-of-eight fashion.  This achieved excellent closure with good hemostasis. We then placed the specimen in a 5 mm Endo Catch bag and removed it via the left midabdomen trocar site. We then ensured hemostasis and remove the trochars under direct visualization. We injected further local anesthetic and closed the skin with 4-0 Monocryl followed by Dermabond. At the end of the procedure all instrument, needle, and sponge counts were correct. I was present and scrubbed throughout the entirety of the case. The patient awoke from anesthesia and was extubated without complication and sent to PACU in stable condition.       Estimated Blood Loss: Minimal    Specimens:   ID Type Source Tests Collected by Time Destination   1 : Abdominal wall mass Fresh Abdomen  Cornell Hashimoto, MD 2/12/2021 1121 Pathology        Complications: None      Jorge L Brooks MD  Bariatric and General Surgeon  [facility]  2/12/2021

## 2021-02-12 NOTE — DISCHARGE INSTRUCTIONS
Discharge Instructions for General Surgery Patients       1. Do not lift any objects weighing more than 15 pounds for 2 weeks. 2. Do not do any housework including vacuuming, scrubbing or yardwork for 4 weeks. 3. Do not drive or operate machinery while taking sedating or narcotic medications. 4. Post operative pain is expected. Try to wean off narcotics as soon as able and take tylenol or NSAIDS. Do not take tylenol with Norco or Percocet as this may harm your liver. No NSAIDS if you are bariatric surgery patient. 5. You may walk as desired and go up and down stairs as needed. Walking is encouraged. 6. You may shower the day after surgery. Do not take tub baths, swim or use hot tubs for 2 weeks. Pat dry wounds after with a towel. 7. Leave glue on wounds. It will fall off with time. Do not scrub around incisions. If redness develops around the glue okay to peel off in hot shower. 8. Regular diet. Take Miralax once or twice a day as needed for constipation. 9. Follow up with provider as scheduled. 10. If you experience fever (greater than 101.5), chills, vomiting or redness or drainage at surgical site, please contact your surgeons office. If you have further questions or concerns, please call your surgeons office at [phone]. DISCHARGE SUMMARY from Nurse    PATIENT INSTRUCTIONS:    After general anesthesia or intravenous sedation, for 24 hours or while taking prescription Narcotics:  · Limit your activities  · Do not drive and operate hazardous machinery  · Do not make important personal or business decisions  · Do  not drink alcoholic beverages  · If you have not urinated within 8 hours after discharge, please contact your surgeon on call.     Report the following to your surgeon:  · Excessive pain, swelling, redness or odor of or around the surgical area  · Temperature over 100.5  · Nausea and vomiting lasting longer than 4 hours or if unable to take medications  · Any signs of decreased circulation or nerve impairment to extremity: change in color, persistent  numbness, tingling, coldness or increase pain  · Any questions    What to do at Home:  Recommended activity: See surgical instructions. If you experience any of the following symptoms as noted above, please follow up with Dr. Sabrina Le. *  Please give a list of your current medications to your Primary Care Provider. *  Please update this list whenever your medications are discontinued, doses are      changed, or new medications (including over-the-counter products) are added. *  Please carry medication information at all times in case of emergency situations. These are general instructions for a healthy lifestyle:    No smoking/ No tobacco products/ Avoid exposure to second hand smoke  Surgeon General's Warning:  Quitting smoking now greatly reduces serious risk to your health. Obesity, smoking, and sedentary lifestyle greatly increases your risk for illness    A healthy diet, regular physical exercise & weight monitoring are important for maintaining a healthy lifestyle    You may be retaining fluid if you have a history of heart failure or if you experience any of the following symptoms:  Weight gain of 3 pounds or more overnight or 5 pounds in a week, increased swelling in our hands or feet or shortness of breath while lying flat in bed. Please call your doctor as soon as you notice any of these symptoms; do not wait until your next office visit. The discharge information has been reviewed with the patient. The patient verbalized understanding. Discharge medications reviewed with the patient and appropriate educational materials and side effects teaching were provided.

## 2021-02-12 NOTE — PROGRESS NOTES
I have reviewed discharge instructions with the patient and spouse. Opportunities for questions offered. The patient and spouse verbalized understanding and have no further questions at this time. Copy of AVS given to patient on discharge. 1350: Spoke with Dr. Zach Olivares office at this time. Patient requesting medication be sent to [facility]. Dr. Kimberlyn Blount office notified. Patient updated.

## 2021-02-12 NOTE — ANESTHESIA POSTPROCEDURE EVALUATION
Post-Anesthesia Evaluation and Assessment    Patient: Yokasta Horton MRN: [id number]  SSN: [id number]    YOB: 1975  Age: 41 y.o. Sex: female      I have evaluated the patient and they are stable and ready for discharge from the PACU. Cardiovascular Function/Vital Signs  Visit Vitals  /72 (BP 1 Location: Right arm, BP Patient Position: At rest)   Pulse 99   Temp 36.8 °C (98.2 °F)   Resp 16   Wt 85.7 kg (189 lb)   SpO2 94%   BMI 27.91 kg/m²       Patient is status post General anesthesia for Procedure(s):  LAPAROSCOPIC  EXCISION OF ABDOMINAL WALL MASS. Nausea/Vomiting: None    Postoperative hydration reviewed and adequate. Pain:  Pain Scale 1: Numeric (0 - 10) (02/12/21 1315)  Pain Intensity 1: 3 (02/12/21 1315)   Managed    Neurological Status:   Neuro (WDL): Within Defined Limits (02/12/21 1315)  Neuro  Neurologic State: Alert (02/12/21 1315)  LUE Motor Response: Purposeful (02/12/21 1315)  LLE Motor Response: Purposeful (02/12/21 1315)  RUE Motor Response: Purposeful (02/12/21 1315)  RLE Motor Response: Purposeful (02/12/21 1315)   At baseline    Mental Status, Level of Consciousness: Alert and  oriented to person, place, and time    Pulmonary Status:   O2 Device: Room air (02/12/21 1315)   Adequate oxygenation and airway patent    Complications related to anesthesia: None    Post-anesthesia assessment completed.  No concerns    Signed By: Merle Mtz MD     February 12, 2021

## 2021-02-17 ENCOUNTER — TELEPHONE (OUTPATIENT)
Dept: SURGERY | Age: 46
End: 2021-02-17

## 2021-02-17 ENCOUNTER — VIRTUAL VISIT (OUTPATIENT)
Dept: HEMATOLOGY | Age: 46
End: 2021-02-17
Payer: COMMERCIAL

## 2021-02-17 DIAGNOSIS — Z91.014 ALLERGY TO BEEF: ICD-10-CM

## 2021-02-17 DIAGNOSIS — K76.0 NAFL (NONALCOHOLIC FATTY LIVER): Primary | ICD-10-CM

## 2021-02-17 PROCEDURE — 99214 OFFICE O/P EST MOD 30 MIN: CPT | Performed by: NURSE PRACTITIONER

## 2021-02-17 NOTE — PROGRESS NOTES
Kaushik Garces MD, MD You Carr PA-C Donice Aquas, [facility]     Emily Espinoza, [facility]   Jared Benjamin, P-MATI Simeon, [facility]       [address]    at [address], [address] [place] [address], [facility] 22.    [phone]    FAX: [address] [place] [address], [address]    [phone]    FAX: [phone]       Patient Care Team:  Adriel Lane NP as PCP - General (Nurse Practitioner)  Adriel Lane NP as PCP - [facility]  Wilbur Jiménez MD (Gynecology)      Problem List  Date Reviewed: 2/19/2021          Codes Class Noted    Allergy to beef ICD-10-CM: Z91.018  ICD-9-CM: V15.05  2/19/2021        Abdominal wall mass ICD-10-CM: R22.2  ICD-9-CM: 789.30  1/29/2021        Elevated liver enzymes ICD-10-CM: R74.8  ICD-9-CM: 790.5  10/12/2020        Gastroparesis ICD-10-CM: K31.84  ICD-9-CM: 536.3  10/12/2020        HTN (hypertension) ICD-10-CM: I10  ICD-9-CM: 401.9  10/12/2020        Nonalcoholic steatohepatitis (DIAZ) ICD-10-CM: K75.81  ICD-9-CM: 571.8  10/12/2020        Transient ischemic attack ICD-10-CM: G45.9  ICD-9-CM: 435.9  10/12/2020        NAFL (nonalcoholic fatty liver) MUM-66-RY: K76.0  ICD-9-CM: 571.8  10/18/2019        Acquired hypothyroidism ICD-10-CM: E03.9  ICD-9-CM: 244.9  10/8/2019        Obese ICD-10-CM: E66.9  ICD-9-CM: 278.00  8/27/2019        Hypercalcemia ICD-10-CM: E83.52  ICD-9-CM: 275.42  6/19/2019        Abnormal mammogram ICD-10-CM: R92.8  ICD-9-CM: 793.80  12/19/2017    Overview Signed 12/19/2017  9:43 AM by Adriel Lane NP     Mammo results 12/19/17:  IMPRESSION: Decreased conspicuity of the left breast asymmetry.  No evidence for  malignancy in the left breast.    Recommendation:  Bilateral screening mammography in June 2018. BI-RADS Assessment Category 2: Benign finding             PTSD (post-traumatic stress disorder) ICD-10-CM: F43.10  ICD-9-CM: 309.81  11/29/2017    Overview Signed 11/29/2017  9:14 AM by Sidney Javed NP     She has been diagnosed with complex PTSD             Irritable bowel syndrome with diarrhea ICD-10-CM: K58.0  ICD-9-CM: 564.1  11/29/2017        Elevated BP without diagnosis of hypertension ICD-10-CM: R03.0  ICD-9-CM: 796.2  9/18/2017        Arthralgia ICD-10-CM: M25.50  ICD-9-CM: 719.40  8/19/2016        Hyperlipidemia ICD-10-CM: E78.5  ICD-9-CM: 272.4  8/19/2016        Vitamin D deficiency ICD-10-CM: E55.9  ICD-9-CM: 268.9  8/19/2016        Anxiety ICD-10-CM: F41.9  ICD-9-CM: 300.00  8/19/2016        DDD (degenerative disc disease), lumbar ICD-10-CM: M51.36  ICD-9-CM: 722.52  8/19/2016        Mild intermittent asthma without complication [facility]-RL: F39.47  ICD-9-CM: 493.90  8/19/2016        Fibroids ICD-10-CM: D21.9  ICD-9-CM: 215.9  3/23/2016            VIRTUAL TELEHEALTH VISIT PERFORMED DUE TO COVID-19 EPIDEMIC    CONSENT:  Jessica Ni, who was seen by synchronous, real-time, audio-video technology, and/or her healthcare decision maker, is aware that this patient-initiated, Telehealth encounter on 2/17/2021 is a billable service, with coverage as determined by her insurance carrier. She is aware that she may receive a bill and has provided verbal consent to proceed. This patient was evaluated during a Virtual Telehealth visit. A caregiver was present if appropriate. Due to this being a TeleHealth encounter performed [facility] the [facility], the physical examination was limited to that listed in the [address] returns to the [address] for management of fatty liver.  The active problem list, all pertinent past medical history, medications, radiologic findings and laboratory findings related to the liver disorder were reviewed with the patient. The patient is a 41 y.o.  female who is suspected to have fatty liver disease based upon ultrasound. Serologic evaluation for markers of chronic liver disease were negative. Imaging of the liver performed in 8/2019 demonstrated severe hepatic steatosis. Assessment of liver fibrosis was performed with Fibroscan 10/2019. The result was 4.1 kPa which correlates with no fibrosis. The CAP score of 339 suggests fatty liver. Since the last office visit the patient had imaging that identified a mass of the right oblique muscle. This was surgically removed 2/12/2021, pathology was endometriosis. She also notes having a sensitivity to red meat and has eliminated this from the diet. There has been improvement with abdominal pain and loose stools. The patient notes fatigue, and occasional pain in the right side over the liver. She has intermittent episodes of loose stools, and low grade fevers. The patient has not experienced the following symptoms: yellowing of the eyes or skin, problems concentrating, swelling of the abdomen, or swelling of the lower extremities. The patient completes all daily activities without any functional limitations. ASSESSMENT AND PLAN:  Benign steatosis/NAFL  The diagnosis is based upon imaging, Fiboscan CAP score, features of metabolic syndrome, and serologic studies that are negative for other causes of chronic liver disease. Elastography performed in 10/2019 suggests no fibrosis. NAFL is a benign form of fatty liver disease and not thought to progress to fibrosis or cirrhosis. Liver transaminases are normal.  ALP is normal.  Liver function is normal.  The platelet count is normal.      Based upon laboratory studies, Fibroscan, and imaging  the patient does not appear to have significant liver injury.     Have performed laboratory testing to monitor liver function and degree of liver injury. This included BMP, hepatic panel, CBC with platelet count, and INR. If the patient looses 10% of current body weight, which is 19 pounds, down to a weight of of 171 pounds, all steatosis will have resolved. Once all steatosis has resolved all inflammation will resolve. The Fibroscan can be repeated annually or as often as clinically indicated to assess for progression or regression of fibrosis. Sensitivity/Allergy to [employer]  Will order alpha-gal food panel. The patient has a history of tick bites. Counseling for diet and weight loss in patients with confirmed or suspected NAFLD  The patient was counseled regarding diet and exercise to achieve weight loss. The best diet for patients with fatty liver is one very low in carbohydrates and enriched with protein such as an Vi's program.      The patient was told not to consume any food products and drinks containing fructose as this enhances hepatic fat synthesis. Screening for Hepatocellular Carcinoma  HCC screening is not necessary if the patient has no evidence of cirrhosis. Treatment of other medical problems in patients with chronic liver disease  There are no contraindications for the patient to take most medications that are necessary for treatment of other medical issues. Counseling for alcohol in patients with chronic liver disease  The patient was counseled regarding alcohol consumption and the effect of alcohol on chronic liver disease. The patient does not consume any significant amount of alcohol. Vaccinations   Vaccination for viral hepatitis A is recommended since the patient has no serologic evidence of previous exposure or vaccination with immunity. Vaccination for viral hepatitis B is not needed. The patient has serologic evidence of prior exposure or vaccination with immunity.   Routine vaccinations against other bacterial and viral agents can be performed as indicated. Annual flu vaccination should be administered if indicated. ALLERGIES  Allergies   Allergen Reactions    Latex Rash     cellulitis    Sulfa (Sulfonamide Antibiotics) Anaphylaxis    Morphine Rash    Neomycin-Bacitracin-Polymyxin Other (comments)    Neosporin [Benzalkonium Chloride] Rash    Adhesive Tape-Silicones Rash       MEDICATIONS  Current Outpatient Medications   Medication Sig    LORazepam (ATIVAN) 1 mg tablet TAKE 1 TAB BY MOUTH TWO (2) TIMES A DAY AS NEEDED FOR ANXIETY. DO NOT USE DAILY    sertraline (ZOLOFT) 100 mg tablet TAKE 2 TABLETS BY MOUTH EVERY DAY (Patient taking differently: 100 mg. 1 TABLET HS)    OTHER Immunotherapy shots    traZODone (DESYREL) 50 mg tablet Take 1 Tab by mouth nightly.  atorvastatin (LIPITOR) 20 mg tablet Take 1 Tab by mouth daily. (Patient taking differently: Take 20 mg by mouth nightly.)    albuterol (PROVENTIL HFA, VENTOLIN HFA, PROAIR HFA) 90 mcg/actuation inhaler Take 1 Puff by inhalation every six (6) hours as needed for Wheezing.  diclofenac (VOLTAREN) 1 % gel Apply 2 g to affected area four (4) times daily.  aspirin delayed-release 81 mg tablet Take  by mouth daily.  CETIRIZINE HCL (ZYRTEC PO) Take 10 mg by mouth two (2) times a day. No current facility-administered medications for this visit. SYSTEM REVIEW NOT RELATED TO LIVER DISEASE OR REVIEWED ABOVE:  Constitution systems: Negative for fever, chills, weight gain, weight loss. Eyes: Negative for visual changes. ENT: Negative for sore throat, painful swallowing. Respiratory: Negative for cough, hemoptysis, SOB. Cardiology: Negative for chest pain, palpitations. GI:  Negative for constipation or diarrhea. Abdominal pain secondary to recent surgery. : Negative for urinary frequency, dysuria, hematuria, nocturia. Skin: Negative for rash. Hematology: Negative for easy bruising, blood clots.     Musculo-skelatal: Negative for back pain, muscle pain, weakness. Neurologic: Negative for headaches, dizziness, vertigo, memory problems not related to HE. Psychology: Negative for anxiety, depression. FAMILY HISTORY:  The father has/had the following following chronic disease(s): HCV, CAD, Dyslipidemia. The patient has no knowledge of the mother's medical condition. There is no family history of liver disease. The following family members have immune disorders: Aunt, Cousin Autoimmune    SOCIAL HISTORY:  The patient is . The patient has 1 child. The patient smokes marijuana. The patient consumes alcohol on social occasions never in excess. The patient works as a teacher. PHYSICAL EXAMINATION PERFORMED BY VIRTUAL TELEHEALTH:  VS: Not performed   General: No acute distress. Eyes: Sclera anicteric. ENT: No oral lesions. Skin: No rashes. spider angiomata. No jaundice. Abdomen: No obvious distention suggesting ascites. Extremities: No edema. No muscle wasting. Neurologic: Alert and oriented. Cranial nerves grossly intact.     LABORATORY STUDIES:  [facility] [address]  [address] Units 10/12/2020 9/25/2020   WBC 3.6 - 11.0 K/uL  9.3   ANC 1.8 - 8.0 K/UL  6.1   HGB 11.5 - 16.0 g/dL  15.6    - 400 K/uL  246   PLT      INR 0.8 - 1.2     AST 0 - 40 IU/L 22 18   ALT 0 - 32 IU/L 36 (H) 43   Alk Phos 39 - 117 IU/L 87 81   Bili, Total 0.0 - 1.2 mg/dL 0.7 1.4 (H)   Bili, Direct 0.00 - 0.40 mg/dL     Albumin 3.8 - 4.8 g/dL 4.7 4.5   BUN 6 - 24 mg/dL 10 9   Creat 0.57 - 1.00 mg/dL 0.80 0.79   Na 134 - 144 mmol/L 140 139   K 3.5 - 5.2 mmol/L 4.2 3.8   Cl 96 - 106 mmol/L 103 108   CO2 20 - 29 mmol/L 22 24   Glucose 65 - 99 mg/dL 98 99     Liver [facility] [address] Ref Rng & Units 8/3/2020   WBC 3.6 - 11.0 K/uL 7.8   ANC 1.8 - 8.0 K/UL 4.9   HGB 11.5 - 16.0 g/dL 15.3    - 400 K/uL 239   PLT     INR 0.8 - 1.2    AST 0 - 40 IU/L 28   ALT 0 - 32 IU/L 38 (H)   Alk Phos 39 - 117 IU/L 77   Bili, Total 0.0 - 1.2 mg/dL 1.0   Bili, Direct 0.00 - 0.40 mg/dL    Albumin 3.8 - 4.8 g/dL 4.9 (H)   BUN 6 - 24 mg/dL 8   Creat 0.57 - 1.00 mg/dL 0.86   Na 134 - 144 mmol/L 139   K 3.5 - 5.2 mmol/L 4.1   Cl 96 - 106 mmol/L 101   CO2 20 - 29 mmol/L 24   Glucose 65 - 99 mg/dL 108 (H)     Repeat testing ordered today. Will follow up when available. SEROLOGIES:  Serologies Latest Ref Rng & Units 8/27/2019   Hep A Ab, Total Negative Negative   Hep B Surface Ag Negative Negative   Hep B Core Ab, Total Negative Negative   Hep B Surface AB QL  Reactive   Hep C Ab 0.0 - 0.9 s/co ratio <0.1   Ferritin 15 - 150 ng/mL 206 (H)   Iron % Saturation 15 - 55 % 21   MAURICIO, IFA  Negative   ASMCA 0 - 19 Units 6   Ceruloplasmin 19.0 - 39.0 mg/dL 27.2   Alpha-1 antitrypsin level 90 - 200 mg/dL 131     LIVER HISTOLOGY:  10/2019. FibroScan performed at [address]. EkPa was 4.1. IQR/med 5%. . The results suggested a fibrosis level of F0. The CAP score suggests fatty liver. ENDOSCOPIC PROCEDURES:  Not available or performed    RADIOLOGY:  8/2019. Ultrasound of liver. Echogenic consistent with fatty liver. No liver mass lesions. No dilated bile ducts. No ascites. OTHER TESTING:  Not available or performed    FOLLOW-UP AFTER VIRTUAL VISIT:  Pursuant to the emergency declaration under the [facility], 1135 waiver authority and the [employer] Act, this Virtual  Visit was conducted, with the patient's (and/or their legal guardian's) consent, to reduce the patient's risk of exposure to COVID-19 and provide necessary medical care. Services were provided through a video synchronous discussion virtually to substitute for an in-person clinic visit. The patient was located in their home. The provider was located in the [place] [address] office.        All of the issues listed above in the Assessment and Plan were discussed with the patient. All questions were answered. The patient expressed a clear understanding of the above. Orders to obtain laboratory testing will be mailed to the patient. An in-person follow-up visit will be scheduled at [place] [address] in 3 months with a Fibroscan. SIGIFREDO Gibbs [phone] Phill Lozano, [address] [facility] [address]

## 2021-02-17 NOTE — TELEPHONE ENCOUNTER
Per on call surgeon and nurse to call patient to schedule an appointment for wound check with Dr. Aditi Ware for today 2/17. Patient stated she has an appointment with her liver specialist this morning at 8:40 and if she needs to be seen today she will give the office a call.

## 2021-02-19 PROBLEM — Z91.014 ALLERGY TO BEEF: Status: ACTIVE | Noted: 2021-02-19

## 2021-03-01 ENCOUNTER — VIRTUAL VISIT (OUTPATIENT)
Dept: SURGERY | Age: 46
End: 2021-03-01
Payer: COMMERCIAL

## 2021-03-01 VITALS — BODY MASS INDEX: 27.4 KG/M2 | WEIGHT: 185 LBS | HEIGHT: 69 IN

## 2021-03-01 DIAGNOSIS — R22.2 ABDOMINAL WALL MASS: ICD-10-CM

## 2021-03-01 DIAGNOSIS — Z09 POSTOPERATIVE EXAMINATION: Primary | ICD-10-CM

## 2021-03-01 PROCEDURE — 99024 POSTOP FOLLOW-UP VISIT: CPT | Performed by: NURSE PRACTITIONER

## 2021-03-01 NOTE — PROGRESS NOTES
1. Have you been to the ER, urgent care clinic since your last visit? Hospitalized since your last visit? No    2. Have you seen or consulted any other health care providers outside of the [address] [place] [address] since your last visit? Include any pap smears or colon screening.  No

## 2021-03-17 ENCOUNTER — OFFICE VISIT (OUTPATIENT)
Dept: SURGERY | Age: 46
End: 2021-03-17
Payer: COMMERCIAL

## 2021-03-17 VITALS
HEART RATE: 85 BPM | HEIGHT: 69 IN | RESPIRATION RATE: 18 BRPM | TEMPERATURE: 98.9 F | DIASTOLIC BLOOD PRESSURE: 86 MMHG | SYSTOLIC BLOOD PRESSURE: 135 MMHG | BODY MASS INDEX: 28.05 KG/M2 | WEIGHT: 189.4 LBS | OXYGEN SATURATION: 98 %

## 2021-03-17 DIAGNOSIS — Z09 POSTOPERATIVE EXAMINATION: Primary | ICD-10-CM

## 2021-03-17 PROBLEM — G89.18 POST-OP PAIN: Status: ACTIVE | Noted: 2021-03-17

## 2021-03-17 PROCEDURE — 99024 POSTOP FOLLOW-UP VISIT: CPT | Performed by: SURGERY

## 2021-03-17 NOTE — PROGRESS NOTES
Surgery Progress Note    3/17/2021    CC: Incisional pain    Subjective:     Patient is 5 weeks out from laparoscopic excision of endometrioma. She reports stabbing pain at her excision site. Otherwise tolerating a diet. Pain happens after she coughs or sneezes. Stabbing pain. This is new. Constitutional: No fever or chills  Neurologic: No headache  Eyes: No scleral icterus or irritated eyes  Nose: No nasal pain or drainage  Mouth: No oral lesions or sore throat  Cardiac: No palpations or chest pain  Pulmonary: No cough or shortness or breath  Gastrointestinal: No nausea, emesis, diarrhea, or constipation  Genitourinary: No dysuria  Musculoskeletal: Pain right lower quadrant  Skin: No rashes or lesions  Psychiatric: No anxiety or depressed mood    Objective:     Visit Vitals  /86   Pulse 85   Temp 98.9 °F (37.2 °C) (Oral)   Resp 18   Ht 5' 9\" (1.753 m)   Wt 189 lb 6.4 oz (85.9 kg)   SpO2 98%   BMI 27.97 kg/m²       General: No acute distress, conversant  Eyes: PERRLA, no scleral icterus  HENT: Normocephalic without oral lesions  Neck: Trachea midline without LAD  Cardiac: Normal pulse rate and rhythm  Pulmonary: Symmetric chest rise with normal effort  GI: Soft, NT, ND, no hernia palpated at excision site  Skin: Warm without rash  Extremities: No edema or joint stiffness  Psych: Appropriate mood and affect    Assessment:     43-year-old female status post excision of right abdominal wall endometrioma    Plan:     The symptoms that she describes sounds like the PDS closure stitches from the abdominal wall excision. I am reassured by this. No signs of hernia. I explained to her that the stitch may last 2 to 3 months. She may feel a popping sensation when they finally let go. I recommend bracing her abdominal wall when she is going to cough or sneeze. No reason to image at this time. She can follow-up as needed.     Total time involved with this patient's care was: 20 minutes, of which >50% of the time was spent counciling the patient.     Harini Morrison MD  Bariatric and General Surgeon  [facility]

## 2021-03-17 NOTE — PROGRESS NOTES
1. Have you been to the ER, urgent care clinic since your last visit? Hospitalized since your last visit? no    2. Have you seen or consulted any other health care providers outside of the [address] [place] [address] since your last visit? Include any pap smears or colon screening.  no

## 2021-04-07 LAB
BASOPHILS # BLD AUTO: 0.1 X10E3/UL (ref 0–0.2)
BASOPHILS NFR BLD AUTO: 1 %
EOSINOPHIL # BLD AUTO: 0.5 X10E3/UL (ref 0–0.4)
EOSINOPHIL NFR BLD AUTO: 6 %
ERYTHROCYTE [DISTWIDTH] IN BLOOD BY AUTOMATED COUNT: 13 % (ref 11.7–15.4)
HCT VFR BLD AUTO: 44.4 % (ref 34–46.6)
HGB BLD-MCNC: 15.2 G/DL (ref 11.1–15.9)
IMM GRANULOCYTES # BLD AUTO: 0 X10E3/UL (ref 0–0.1)
IMM GRANULOCYTES NFR BLD AUTO: 0 %
LYMPHOCYTES # BLD AUTO: 2.4 X10E3/UL (ref 0.7–3.1)
LYMPHOCYTES NFR BLD AUTO: 31 %
MCH RBC QN AUTO: 29.2 PG (ref 26.6–33)
MCHC RBC AUTO-ENTMCNC: 34.2 G/DL (ref 31.5–35.7)
MCV RBC AUTO: 85 FL (ref 79–97)
MONOCYTES # BLD AUTO: 0.6 X10E3/UL (ref 0.1–0.9)
MONOCYTES NFR BLD AUTO: 7 %
NEUTROPHILS # BLD AUTO: 4.2 X10E3/UL (ref 1.4–7)
NEUTROPHILS NFR BLD AUTO: 55 %
PLATELET # BLD AUTO: 248 X10E3/UL (ref 150–450)
RBC # BLD AUTO: 5.21 X10E6/UL (ref 3.77–5.28)
WBC # BLD AUTO: 7.7 X10E3/UL (ref 3.4–10.8)

## 2021-04-08 LAB
ALBUMIN SERPL-MCNC: 4.9 G/DL (ref 3.8–4.8)
ALP SERPL-CCNC: 97 IU/L (ref 39–117)
ALT SERPL-CCNC: 34 IU/L (ref 0–32)
AST SERPL-CCNC: 22 IU/L (ref 0–40)
BILIRUB DIRECT SERPL-MCNC: 0.19 MG/DL (ref 0–0.4)
BILIRUB SERPL-MCNC: 0.8 MG/DL (ref 0–1.2)
BUN SERPL-MCNC: 9 MG/DL (ref 6–24)
BUN/CREAT SERPL: 13 (ref 9–23)
CALCIUM SERPL-MCNC: 9.7 MG/DL (ref 8.7–10.2)
CHLORIDE SERPL-SCNC: 106 MMOL/L (ref 96–106)
CO2 SERPL-SCNC: 20 MMOL/L (ref 20–29)
CREAT SERPL-MCNC: 0.67 MG/DL (ref 0.57–1)
GLUCOSE SERPL-MCNC: 113 MG/DL (ref 65–99)
POTASSIUM SERPL-SCNC: 4.5 MMOL/L (ref 3.5–5.2)
PROT SERPL-MCNC: 7.2 G/DL (ref 6–8.5)
SODIUM SERPL-SCNC: 140 MMOL/L (ref 134–144)

## 2021-04-12 NOTE — PROGRESS NOTES
My chart message sent to the patient regarding the blood work results. The liver enzymes are normal. Alpha-gal was ordered due to her sensitivity to beef. I have not received the results so am unsure if this was performed.

## 2021-04-13 LAB
ALPHA-GAL IGE QN: <0.1 KU/L
BEEF IGE QN: <0.1 KU/L
DEPRECATED BEEF IGE RAST QL: 0
DEPRECATED LAMB IGE RAST QL: 0
DEPRECATED PORK IGE RAST QL: 0
LAMB IGE QN: <0.1 KU/L
PORK IGE QN: <0.1 KU/L

## 2021-05-17 ENCOUNTER — OFFICE VISIT (OUTPATIENT)
Dept: HEMATOLOGY | Age: 46
End: 2021-05-17
Payer: COMMERCIAL

## 2021-05-17 VITALS
HEART RATE: 89 BPM | HEIGHT: 69 IN | RESPIRATION RATE: 20 BRPM | DIASTOLIC BLOOD PRESSURE: 82 MMHG | BODY MASS INDEX: 28.35 KG/M2 | TEMPERATURE: 97.5 F | SYSTOLIC BLOOD PRESSURE: 150 MMHG | WEIGHT: 191.4 LBS | OXYGEN SATURATION: 97 %

## 2021-05-17 DIAGNOSIS — K21.9 GASTROESOPHAGEAL REFLUX DISEASE WITHOUT ESOPHAGITIS: ICD-10-CM

## 2021-05-17 DIAGNOSIS — K76.0 NAFL (NONALCOHOLIC FATTY LIVER): Primary | ICD-10-CM

## 2021-05-17 PROCEDURE — 99213 OFFICE O/P EST LOW 20 MIN: CPT | Performed by: NURSE PRACTITIONER

## 2021-05-17 PROCEDURE — 91200 LIVER ELASTOGRAPHY: CPT | Performed by: NURSE PRACTITIONER

## 2021-05-17 RX ORDER — OMEPRAZOLE 20 MG/1
20 CAPSULE, DELAYED RELEASE ORAL DAILY
Qty: 30 CAP | Refills: 5 | Status: SHIPPED | OUTPATIENT
Start: 2021-05-17 | End: 2021-09-02 | Stop reason: SDUPTHER

## 2021-05-17 NOTE — PROGRESS NOTES
My Correia MD, MD Sonali Villegas, PA-C    Valarie Weinstein, ACNP-BC     Emily CLEVELAND Alexis, AGPCNP-BC   José Miguelaisha Medley, FNP-C    Andre Ralph, [employer]       [address]    at [address], [address] [place] [address] Kimberly Lozano  22.    [phone]    FAX: [address] [place] [address], [address]    [phone]    FAX: [phone]       Patient Care Team:  Cameron Sung NP as PCP - General (Nurse Practitioner)  Cameron Sung NP as PCP - [address] [place] [address] Provider  Olga Lidia Young MD (Gynecology)      Problem List  Date Reviewed: 3/17/2021          Codes Class Noted    Post-op pain ICD-10-CM: G89.18  ICD-9-CM: 338.18  3/17/2021        Allergy to beef ICD-10-CM: Z91.018  ICD-9-CM: V15.05  2/19/2021        Abdominal wall mass ICD-10-CM: R22.2  ICD-9-CM: 789.30  1/29/2021        Elevated liver enzymes ICD-10-CM: R74.8  ICD-9-CM: 790.5  10/12/2020        Gastroparesis ICD-10-CM: K31.84  ICD-9-CM: 536.3  10/12/2020        HTN (hypertension) ICD-10-CM: I10  ICD-9-CM: 401.9  10/12/2020        Nonalcoholic steatohepatitis (DIAZ) ICD-10-CM: K75.81  ICD-9-CM: 571.8  10/12/2020        Transient ischemic attack ICD-10-CM: G45.9  ICD-9-CM: 435.9  10/12/2020        NAFL (nonalcoholic fatty liver) [id number]-[id number]: K76.0  ICD-9-CM: 571.8  10/18/2019        Acquired hypothyroidism ICD-10-CM: E03.9  ICD-9-CM: 244.9  10/8/2019        Obese ICD-10-CM: E66.9  ICD-9-CM: 278.00  8/27/2019        Hypercalcemia ICD-10-CM: E83.52  ICD-9-CM: 275.42  6/19/2019        Abnormal mammogram ICD-10-CM: R92.8  ICD-9-CM: 793.80  12/19/2017    Overview Signed 12/19/2017  9:43 AM by Cameron Sung NP     Mammo results 12/19/17:  IMPRESSION: Decreased conspicuity of the left breast asymmetry. No evidence for  malignancy in the left breast.    Recommendation:  Bilateral screening mammography in June 2018. BI-RADS Assessment Category 2: Benign finding             PTSD (post-traumatic stress disorder) ICD-10-CM: F43.10  ICD-9-CM: 309.81  11/29/2017    Overview Signed 11/29/2017  9:14 AM by Cortes Reddy NP     She has been diagnosed with complex PTSD             Irritable bowel syndrome with diarrhea ICD-10-CM: K58.0  ICD-9-CM: 564.1  11/29/2017        Elevated BP without diagnosis of hypertension ICD-10-CM: R03.0  ICD-9-CM: 796.2  9/18/2017        Arthralgia ICD-10-CM: M25.50  ICD-9-CM: 719.40  8/19/2016        Hyperlipidemia ICD-10-CM: E78.5  ICD-9-CM: 272.4  8/19/2016        Vitamin D deficiency ICD-10-CM: E55.9  ICD-9-CM: 268.9  8/19/2016        Anxiety ICD-10-CM: F41.9  ICD-9-CM: 300.00  8/19/2016        DDD (degenerative disc disease), lumbar ICD-10-CM: M51.36  ICD-9-CM: 722.52  8/19/2016        Mild intermittent asthma without complication [id number]-[id number]: A79.27  ICD-9-CM: 493.90  8/19/2016        Fibroids ICD-10-CM: D21.9  ICD-9-CM: 215.9  3/23/2016              Roderick Barnes [facility] management of fatty liver. The active problem list, all pertinent past medical history, medications, radiologic findings and laboratory findings related to the liver disorder were reviewed with the patient. The patient is a 41 y.o.  female who is suspected to have fatty liver disease based upon ultrasound. Serologic evaluation for markers of chronic liver disease were negative. Imaging of the liver performed in 8/2019 demonstrated hepatic steatosis. Assessment of liver fibrosis was performed with Fibroscan 10/2019. The result was 4.1 kPa which correlates with no fibrosis. The CAP score of 339 suggests fatty liver. She returns for follow up Fibroscan.      Since the last office visit the patient has continued to make dietary changes but has ongoing diarrhea accompanied by intermittent abdominal pain. She has tried several dietary elimination diets with no success in resolution of symptoms. The patient notes fatigue, and occasional pain in the right side over the liver. She has intermittent episodes of loose stools, and low grade fevers. The patient has not experienced the following symptoms: yellowing of the eyes or skin, problems concentrating, swelling of the abdomen, or swelling of the lower extremities. The patient completes all daily activities without any functional limitations. ASSESSMENT AND PLAN:  Benign steatosis/NAFL  The diagnosis is based upon imaging, Fiboscan CAP score, features of metabolic syndrome, and serologic studies that are negative for other causes of chronic liver disease. Elastography performed in 10/2019 suggests no fibrosis. Assessment of liver fibrosis was performed with Fibroscan in the office today. The result was 5.1 kPa which correlates with   no fibrosis. The CAP score of 335 suggests hepatic steatosis. Have performed laboratory testing to monitor liver function and degree of liver injury. This included BMP, hepatic panel, CBC with platelet count and INR. Laboratory testing from 4/07/2021 reviewed in detail. The liver transaminases, ALP, liver function, and platelet count are normal.     Based upon laboratory studies, Fibroscan, and imaging  the patient does not appear to have significant liver injury. Advised the patient continue to avoid or limit portions of pasta, bread, potatoes, rice and sugary foods. Sensitivity/Allergy to [employer]  Alpha-gal testing was negative. Counseling for diet and weight loss in patients with confirmed or suspected NAFLD  The patient was counseled regarding diet and exercise to achieve weight loss.   The best diet for patients with fatty liver is one very low in carbohydrates and enriched with protein such as an [employer] program.      The patient was told not to consume any food products and drinks containing fructose as this enhances hepatic fat synthesis. Screening for Hepatocellular Carcinoma  HCC screening is not necessary if the patient has no evidence of cirrhosis. Treatment of other medical problems in patients with chronic liver disease  There are no contraindications for the patient to take most medications that are necessary for treatment of other medical issues. Counseling for alcohol in patients with chronic liver disease  The patient was counseled regarding alcohol consumption and the effect of alcohol on chronic liver disease. The patient does not consume any significant amount of alcohol. Vaccinations   Vaccination for viral hepatitis A is recommended since the patient has no serologic evidence of previous exposure or vaccination with immunity. Vaccination for viral hepatitis B is not needed. The patient has serologic evidence of prior exposure or vaccination with immunity. Routine vaccinations against other bacterial and viral agents can be performed as indicated. Annual flu vaccination should be administered if indicated. ALLERGIES  Allergies   Allergen Reactions    Latex Rash     cellulitis    Sulfa (Sulfonamide Antibiotics) Anaphylaxis    Morphine Rash    Neomycin-Bacitracin-Polymyxin Other (comments)    Neosporin [Benzalkonium Chloride] Rash    Adhesive Tape-Silicones Rash       MEDICATIONS  Current Outpatient Medications   Medication Sig    LORazepam (ATIVAN) 1 mg tablet TAKE 1 TAB BY MOUTH TWO (2) TIMES A DAY AS NEEDED FOR ANXIETY. DO NOT USE DAILY    sertraline (ZOLOFT) 100 mg tablet TAKE 2 TABLETS BY MOUTH EVERY DAY (Patient taking differently: 100 mg. 1 TABLET HS)    OTHER Immunotherapy shots    traZODone (DESYREL) 50 mg tablet Take 1 Tab by mouth nightly.  atorvastatin (LIPITOR) 20 mg tablet Take 1 Tab by mouth daily.  (Patient taking differently: Take 20 mg by mouth nightly.)    albuterol (PROVENTIL HFA, VENTOLIN HFA, PROAIR HFA) 90 mcg/actuation inhaler Take 1 Puff by inhalation every six (6) hours as needed for Wheezing.  diclofenac (VOLTAREN) 1 % gel Apply 2 g to affected area four (4) times daily.  aspirin delayed-release 81 mg tablet Take  by mouth daily.  CETIRIZINE HCL (ZYRTEC PO) Take 10 mg by mouth two (2) times a day. No current facility-administered medications for this visit. SYSTEM REVIEW NOT RELATED TO LIVER DISEASE OR REVIEWED ABOVE:  Constitution systems: Negative for fever, chills, weight gain, weight loss. Eyes: Negative for visual changes. ENT: Negative for sore throat, painful swallowing. Respiratory: Negative for cough, hemoptysis, SOB. Cardiology: Negative for chest pain, palpitations. GI:  Negative for constipation or diarrhea. : Negative for urinary frequency, dysuria, hematuria, nocturia. Skin: Negative for rash. Hematology: Negative for easy bruising, blood clots. Musculo-skelatal: Negative for back pain, muscle pain, weakness. Neurologic: Negative for headaches, dizziness, vertigo, memory problems not related to HE. Psychology: Negative for anxiety, depression. FAMILY HISTORY:  The father has/had the following following chronic disease(s): HCV, CAD, Dyslipidemia. The patient has no knowledge of the mother's medical condition. There is no family history of liver disease. The following family members have immune disorders: Aunt, Cousin Autoimmune    SOCIAL HISTORY:  The patient is . The patient has 1 child. The patient smokes marijuana. The patient consumes alcohol on social occasions never in excess. The patient works as a teacher.        PHYSICAL EXAMINATION:  Visit Vitals  BP (!) 150/82 (BP 1 Location: Right upper arm, BP Patient Position: Sitting, BP Cuff Size: Adult)   Pulse 89   Temp 97.5 °F (36.4 °C) (Temporal)   Resp 20   Ht 5' 9\" (1.753 m) Wt 191 lb 6.4 oz (86.8 kg)   LMP 03/18/2016   SpO2 97%   BMI 28.26 kg/m²       General: No acute distress. Eyes: Sclera anicteric. ENT: No oral lesions. Thyroid normal.  Nodes: No adenopathy. Skin: No spider angiomata. No jaundice. No palmar erythema. Respiratory: Lungs clear to auscultation. Cardiovascular: Regular heart rate. No murmurs. No JVD. Abdomen: Soft non-tender, liver size normal to percussion/palpation. Spleen not palpable. No obvious ascites. Extremities: No edema. No muscle wasting. No gross arthritic changes. Neurologic: Alert and oriented. Cranial nerves grossly intact. No asterixis. LABORATORY STUDIES:  [facility] [place] of [address] [place] [address] & Units 4/7/2021   WBC 3.4 - 10.8 x10E3/uL 7.7   ANC 1.4 - 7.0 x10E3/uL 4.2   HGB 11.1 - 15.9 g/dL 15.2    - 450 x10E3/uL 248   PLT     INR 0.8 - 1.2    AST 0 - 40 IU/L 22   ALT 0 - 32 IU/L 34 (H)   Alk Phos 39 - 117 IU/L 97   Bili, Total 0.0 - 1.2 mg/dL 0.8   Bili, Direct 0.00 - 0.40 mg/dL 0.19   Albumin 3.8 - 4.8 g/dL 4.9 (H)   BUN 6 - 24 mg/dL 9   Creat 0.57 - 1.00 mg/dL 0.67   Na 134 - 144 mmol/L 140   K 3.5 - 5.2 mmol/L 4.5   Cl 96 - 106 mmol/L 106   CO2 20 - 29 mmol/L 20   Glucose 65 - 99 mg/dL 113 (H)     [facility] [place] of [address] [place] [address] Ref Rng & Units 10/12/2020 9/25/2020   WBC 3.6 - 11.0 K/uL  9.3   ANC 1.8 - 8.0 K/UL  6.1   HGB 11.5 - 16.0 g/dL  15.6    - 400 K/uL  246   PLT      INR 0.8 - 1.2     AST 0 - 40 IU/L 22 18   ALT 0 - 32 IU/L 36 (H) 43   Alk Phos 39 - 117 IU/L 87 81   Bili, Total 0.0 - 1.2 mg/dL 0.7 1.4 (H)   Bili, Direct 0.00 - 0.40 mg/dL     Albumin 3.8 - 4.8 g/dL 4.7 4.5   BUN 6 - 24 mg/dL 10 9   Creat 0.57 - 1.00 mg/dL 0.80 0.79   Na 134 - 144 mmol/L 140 139   K 3.5 - 5.2 mmol/L 4.2 3.8   Cl 96 - 106 mmol/L 103 108   CO2 20 - 29 mmol/L 22 24   Glucose 65 - 99 mg/dL 98 99     Laboratory testing from 4/07/2021 reviewed in detail.  Additional testing included to evaluate progression or regression of disease. SEROLOGIES:  Serologies Latest Ref Rng & Units 8/27/2019   Hep A Ab, Total Negative Negative   Hep B Surface Ag Negative Negative   Hep B Core Ab, Total Negative Negative   Hep B Surface AB QL  Reactive   Hep C Ab 0.0 - 0.9 s/co ratio <0.1   Ferritin 15 - 150 ng/mL 206 (H)   Iron % Saturation 15 - 55 % 21   MAURICIO, IFA  Negative   ASMCA 0 - 19 Units 6   Ceruloplasmin 19.0 - 39.0 mg/dL 27.2   Alpha-1 antitrypsin level 90 - 200 mg/dL 131     LIVER HISTOLOGY:  10/2019. FibroScan performed at [facility]. EkPa was 4.1. IQR/med 5%. . The results suggested a fibrosis level of F0. The CAP score suggests fatty liver.   5/2021. FibroScan performed at [facility]. EkPa was 5.1. IQR/med 8%. . The results suggested a fibrosis level of F0. The CAP score suggests there is hepatic steatosis. ENDOSCOPIC PROCEDURES:  Not available or performed    RADIOLOGY:  8/2019. Ultrasound of liver. Echogenic consistent with fatty liver. No liver mass lesions. No dilated bile ducts. No ascites. OTHER TESTING:  Not available or performed    FOLLOW-UP:  All of the issues listed above in the Assessment and Plan were discussed with the patient. All questions were answered. The patient expressed a clear understanding of the above. 1901 Merged with [facility] in 6 months. Will continue to follow patient due to ongoing symptoms. SONNY Gibbs-BC  [address] of [address] [address] [facility] [address] [id number] Phill Lozano, [address] [place] [address] 22.  [address] [facility] [address]

## 2021-05-17 NOTE — PROGRESS NOTES
Identified pt with two pt identifiers(name and ). Reviewed record in preparation for visit and have obtained necessary documentation. Chief Complaint   Patient presents with    Fatty Liver     NAFL    Other     DIAZ      Vitals:    21 0944   BP: (!) 150/82   Pulse: 89   Resp: 20   Temp: 97.5 °F (36.4 °C)   TempSrc: Temporal   SpO2: 97%   Weight: 191 lb 6.4 oz (86.8 kg)   Height: 5' 9\" (1.753 m)   PainSc:   0 - No pain   LMP: 2016       Health Maintenance Review: Patient reminded of \"due or due soon\" health maintenance. I have asked the patient to contact his/her primary care provider (PCP) for follow-up on his/her health maintenance. Coordination of Care Questionnaire:  :   1) Have you been to an emergency room, urgent care, or hospitalized since your last visit? If yes, where when, and reason for visit? no       2. Have seen or consulted any other health care provider since your last visit? If yes, where when, and reason for visit? NO      Patient is accompanied by self I have received verbal consent from Jenn Barnes to discuss any/all medical information while they are present in the room.

## 2021-07-06 NOTE — PROGRESS NOTES
I was in the office while conducting this encounter. Consent:  She and/or her healthcare decision maker is aware that this patient-initiated Telehealth encounter is a billable service, with coverage as determined by her insurance carrier. She is aware that she may receive a bill and has provided verbal consent to proceed: No - Not billable    This virtual visit was conducted via [employer] Pursuant to the emergency declaration under the [facility], [facility] waiver authority and the [employer] Act, this Virtual  Visit was conducted to reduce the patient's risk of exposure to COVID-19 and provide continuity of care for an established patient. Services were provided through a video synchronous discussion virtually to substitute for in-person clinic visit. Due to this being a TeleHealth evaluation, many elements of the physical examination are unable to be assessed. Total Time: minutes: 11-20 minutes. Subjective:    Rico Marroquin is a 41 y.o. female presents for postop care following diag lap and excision of abd wall mass by Dr. Ivonne Hernandez. She is receiving wound care by self who reports no problems with wound care. Pt starting experiencing burning pain in former abd wall mass site 2 days ago, after nephew jumped on her lap. Also skin in that area has a \"different sensation. \" Eating ok, moving bowels. Walking for exercise. Objective:     Visit Vitals  Ht 5' 9\" (1.753 m)   Wt 185 lb (83.9 kg)   LMP 03/18/2016   BMI 27.32 kg/m²         Wound:  Location: abd  clean, dry, no drainage, healed. \"Sensitive\" skin in LLQ  periwound skin intact, no erythema or induration         FINAL PATHOLOGIC DIAGNOSIS   Soft tissue, abdominal wall, excision:   Endometriosis     Assessment:     S/P same. Doing well postoperatively. Plan:     1. Warm heat to LLQ area. Pain is c/w nerve pain which should slowly improve with time  2.  Pt is to increase activities as tolerated. No heavy lifting >20 lbs for another 2 weeks  3. Follow-up prn    Ms. Nunu Garay has a reminder for a \"due or due soon\" health maintenance. I have asked that she contact her primary care provider for follow-up on this health maintenance. Patient verbalized understanding and agreement. Patient is a 89 y/o female admitted to [facility] due to hypercapnic respiratory failure, COPD exacerbation.

## 2021-07-30 DIAGNOSIS — E78.5 HYPERLIPIDEMIA, UNSPECIFIED HYPERLIPIDEMIA TYPE: ICD-10-CM

## 2021-07-30 RX ORDER — ATORVASTATIN CALCIUM 20 MG/1
20 TABLET, FILM COATED ORAL
Qty: 90 TABLET | Refills: 0 | Status: SHIPPED | OUTPATIENT
Start: 2021-07-30 | End: 2021-10-21 | Stop reason: SDUPTHER

## 2021-08-01 DIAGNOSIS — G47.00 INSOMNIA, UNSPECIFIED TYPE: ICD-10-CM

## 2021-08-02 RX ORDER — TRAZODONE HYDROCHLORIDE 50 MG/1
50 TABLET ORAL
Qty: 30 TABLET | Refills: 0 | Status: SHIPPED | OUTPATIENT
Start: 2021-08-02 | End: 2021-08-24 | Stop reason: SDUPTHER

## 2021-08-24 ENCOUNTER — OFFICE VISIT (OUTPATIENT)
Dept: INTERNAL MEDICINE CLINIC | Age: 46
End: 2021-08-24
Payer: COMMERCIAL

## 2021-08-24 VITALS
OXYGEN SATURATION: 98 % | HEIGHT: 69 IN | SYSTOLIC BLOOD PRESSURE: 146 MMHG | HEART RATE: 102 BPM | BODY MASS INDEX: 26.36 KG/M2 | WEIGHT: 178 LBS | RESPIRATION RATE: 18 BRPM | DIASTOLIC BLOOD PRESSURE: 84 MMHG | TEMPERATURE: 98 F

## 2021-08-24 DIAGNOSIS — I10 ESSENTIAL HYPERTENSION: ICD-10-CM

## 2021-08-24 DIAGNOSIS — M51.36 DDD (DEGENERATIVE DISC DISEASE), LUMBAR: ICD-10-CM

## 2021-08-24 DIAGNOSIS — G47.00 INSOMNIA, UNSPECIFIED TYPE: ICD-10-CM

## 2021-08-24 DIAGNOSIS — E78.5 HYPERLIPIDEMIA, UNSPECIFIED HYPERLIPIDEMIA TYPE: ICD-10-CM

## 2021-08-24 DIAGNOSIS — K58.0 IRRITABLE BOWEL SYNDROME WITH DIARRHEA: ICD-10-CM

## 2021-08-24 DIAGNOSIS — Z86.73 HISTORY OF TRANSIENT ISCHEMIC ATTACK (TIA): Primary | ICD-10-CM

## 2021-08-24 DIAGNOSIS — F41.9 ANXIETY: ICD-10-CM

## 2021-08-24 PROCEDURE — 99214 OFFICE O/P EST MOD 30 MIN: CPT | Performed by: INTERNAL MEDICINE

## 2021-08-24 RX ORDER — TRAZODONE HYDROCHLORIDE 50 MG/1
100 TABLET ORAL
Qty: 60 TABLET | Refills: 0 | Status: SHIPPED | OUTPATIENT
Start: 2021-08-24 | End: 2021-09-02 | Stop reason: DRUGHIGH

## 2021-08-24 RX ORDER — LISINOPRIL 5 MG/1
5 TABLET ORAL DAILY
Qty: 60 TABLET | Refills: 0 | Status: SHIPPED | OUTPATIENT
Start: 2021-08-24 | End: 2021-10-21

## 2021-08-24 NOTE — PROGRESS NOTES
HISTORY OF PRESENT ILLNESS  Elba Red is a 40 y.o. female. Patient was seen to establish care. Past medical history was reviewed. HTN: patient had been on BP medications in the past, but was stopped due to control. Is on a statin for history of TIA. Also reports a family history of CVA and MI. Reports that she taking her BP several times a day and is above 140 and even higher at times. Reports she is on a DASH diet. Denies CP, SOB or HA  Describes nightmares that have been in the last few months. Reports that she suffers from insomnia. has tried OTC with no help. Reports the trazodone helps staying asleep but not initiating. States that the dreams are not scary, but she is aware of everything. Does suffer from depression,a anxiety and PTSD. Has a history of fatty liver. Is seen by hepatology. Last labs were stable. Will see GI tomorrow for what is thought to be IBS.  No blood in stool or nausea   Visit Vitals  BP (!) 146/84 (BP 1 Location: Right upper arm, BP Patient Position: Sitting, BP Cuff Size: Adult)   Pulse (!) 102   Temp 98 °F (36.7 °C) (Oral)   Resp 18   Ht 5' 9\" (1.753 m)   Wt 178 lb (80.7 kg)   LMP 03/18/2016   SpO2 98%   BMI 26.29 kg/m²     Past Medical History:   Diagnosis Date    Adverse effect of anesthesia     hard to wake up    Anxiety     Arthritis     back    Asperger syndrome     Asthma     Chronic pain 2009    Depression     Gastroparesis     GERD (gastroesophageal reflux disease) 2019    Hypercholesterolemia     Hypertension 2017    NO MEDS    IBD (inflammatory bowel disease)     Ill-defined condition     discomfort hips shoulders knees and feet and hands MAURICIO was positive    Ill-defined condition     GASTROPORISIS    Irritable bowel disease 1995    Liver disease 2008    DIAZ    Neurological disorder     Psychiatric disorder     anxiety and depression    Stroke ([facility] [address] 2017    TIA    Thyroid disease 2019    NO MEDS     Past Surgical History:   Procedure Laterality Date    HX GI      COLONOSCOPY    HX HEENT  10/2020    Immunotherapy shots    HX HYSTERECTOMY  4/14/16    da Dotty Robotic Total Laparoscopic Hysterectomy with bilateral  salpingectomy more than 250 grams. Cystoscopy.     HX ORTHOPAEDIC      Spinal fusion, back surgery,rods and screws    HX ORTHOPAEDIC      achilles lenghtening    HX ORTHOPAEDIC      laminectomy    HX OTHER SURGICAL      tumor removed from nose as child    HX OTHER SURGICAL      dental extraction    HX OTHER SURGICAL  02/12/2021    LAPAROSCOPIC  EXCISION OF ABDOMINAL WALL MASS     Family History   Problem Relation Age of Onset    Cancer Mother         Cervical    Delayed Awakening Mother     Heart Disease Father     Depression Father     Bleeding Prob Father         Low platelets    Elevated Lipids Father     Hypertension Father     Psychiatric Disorder Father     Alzheimer Maternal Grandmother     Diabetes Maternal Grandfather     Hypertension Maternal Grandfather     Lung Disease Maternal Grandfather         COPD    Heart Surgery Paternal Grandmother     Elevated Lipids Paternal Grandmother     Heart Disease Paternal Grandmother     Hypertension Paternal Grandmother     Heart Surgery Paternal Grandfather     Elevated Lipids Paternal Grandfather     Heart Disease Paternal Grandfather     Hypertension Paternal Grandfather     Asthma Sister     Headache Sister     Asthma Sister     Cancer Sister         Skin    Hypertension Sister     Psychiatric Disorder Sister     Elevated Lipids Paternal Uncle     Heart Disease Paternal Uncle     Hypertension Paternal Uncle     Psychiatric Disorder Paternal Uncle     Elevated Lipids Paternal Uncle     Psychiatric Disorder Paternal Uncle     Heart Disease Paternal Aunt     Hypertension Paternal Aunt     Psychiatric Disorder Paternal Aunt      Outpatient Encounter Medications as of 8/24/2021   Medication Sig Dispense Refill    lisinopriL (PRINIVIL, ZESTRIL) 5 mg tablet Take 1 Tablet by mouth daily. 60 Tablet 0    traZODone (DESYREL) 50 mg tablet Take 2 Tablets by mouth nightly. FUTURE REFILLS MUST COME FROM NEW PCP - MARIALUISA SKIFF NO LONGER AT PRACTICE. 60 Tablet 0    atorvastatin (LIPITOR) 20 mg tablet Take 1 Tablet by mouth nightly. FUTURE REFILLS MUST COME FROM NEW PCP - MARIALUISA SKIFF NO LONGER AT PRACTICE 90 Tablet 0    omeprazole (PRILOSEC) 20 mg capsule Take 1 Cap by mouth daily. 30 Cap 5    LORazepam (ATIVAN) 1 mg tablet TAKE 1 TAB BY MOUTH TWO (2) TIMES A DAY AS NEEDED FOR ANXIETY. DO NOT USE DAILY 30 Tab 0    sertraline (ZOLOFT) 100 mg tablet TAKE 2 TABLETS BY MOUTH EVERY DAY (Patient taking differently: 100 mg. 1 TABLET HS) 180 Tab 2    OTHER Immunotherapy shots      albuterol (PROVENTIL HFA, VENTOLIN HFA, PROAIR HFA) 90 mcg/actuation inhaler Take 1 Puff by inhalation every six (6) hours as needed for Wheezing. 1 Inhaler 2    aspirin delayed-release 81 mg tablet Take  by mouth daily.  [DISCONTINUED] traZODone (DESYREL) 50 mg tablet Take 1 Tablet by mouth nightly. FUTURE REFILLS MUST COME FROM NEW PCP - MARIALUISA SKIFF NO LONGER AT PRACTICE. 30 Tablet 0    diclofenac (VOLTAREN) 1 % gel Apply 2 g to affected area four (4) times daily. (Patient not taking: Reported on 8/24/2021) 100 g 2    CETIRIZINE HCL (ZYRTEC PO) Take 10 mg by mouth two (2) times a day. (Patient not taking: Reported on 8/24/2021)       No facility-administered encounter medications on file as of 8/24/2021. HPI    Review of Systems   Constitutional: Negative. Respiratory: Negative. Cardiovascular: Negative. Gastrointestinal: Negative. Musculoskeletal: Negative. Neurological: Negative. Psychiatric/Behavioral: Positive for depression. The patient has insomnia. The patient is not nervous/anxious. Physical Exam  Vitals and nursing note reviewed. HENT:      Head: Normocephalic. Cardiovascular:      Rate and Rhythm: Normal rate and regular rhythm.    Pulmonary: Effort: Pulmonary effort is normal.      Breath sounds: Normal breath sounds. Abdominal:      Palpations: Abdomen is soft. Musculoskeletal:         General: Normal range of motion. Skin:     General: Skin is warm. Neurological:      Mental Status: She is alert and oriented to person, place, and time. Psychiatric:         Mood and Affect: Mood is anxious. ASSESSMENT and PLAN  Diagnoses and all orders for this visit:    1. History of transient ischemic attack (TIA)    2. Hyperlipidemia, unspecified hyperlipidemia type    3. Irritable bowel syndrome with diarrhea    4. DDD (degenerative disc disease), lumbar    5. Anxiety    6. Essential hypertension  -     lisinopriL (PRINIVIL, ZESTRIL) 5 mg tablet; Take 1 Tablet by mouth daily. 7. Insomnia, unspecified type  -     traZODone (DESYREL) 50 mg tablet; Take 2 Tablets by mouth nightly. FUTURE REFILLS MUST COME FROM NEW PCP - KATIE SKIFF NO LONGER AT PRACTICE.       Follow-up and Dispositions    · Return in about 6 months (around 2/24/2022), or if symptoms worsen or fail to improve.       lab results and schedule of future lab studies reviewed with patient  reviewed diet, exercise and weight control  cardiovascular risk and specific lipid/LDL goals reviewed  reviewed medications and side effects in detail

## 2021-08-24 NOTE — PROGRESS NOTES
Health Maintenance Due   Topic Date Due    COVID-19 Vaccine (1) Never done    Lipid Screen  08/03/2021       Chief Complaint   Patient presents with    New Patient    Hypertension    Chest Pain    Labs       1. Have you been to the ER, urgent care clinic since your last visit? Hospitalized since your last visit? No    2. Have you seen or consulted any other health care providers outside of the [address] [place] [address] since your last visit? Include any pap smears or colon screening. No    3) Do you have an Advance Directive on file? no    4) Are you interested in receiving information on Advance Directives? NO      Patient is accompanied by self I have received verbal consent from Jaspal Lutz to discuss any/all medical information while they are present in the room.

## 2021-08-30 DIAGNOSIS — G47.00 INSOMNIA, UNSPECIFIED TYPE: ICD-10-CM

## 2021-08-30 RX ORDER — TRAZODONE HYDROCHLORIDE 50 MG/1
100 TABLET ORAL
Qty: 60 TABLET | Refills: 0 | Status: CANCELLED | OUTPATIENT
Start: 2021-08-30

## 2021-09-02 DIAGNOSIS — K21.9 GASTROESOPHAGEAL REFLUX DISEASE WITHOUT ESOPHAGITIS: ICD-10-CM

## 2021-09-02 DIAGNOSIS — G47.00 INSOMNIA, UNSPECIFIED TYPE: ICD-10-CM

## 2021-09-02 RX ORDER — TRAZODONE HYDROCHLORIDE 100 MG/1
100 TABLET ORAL
Qty: 90 TABLET | Refills: 0 | Status: SHIPPED | OUTPATIENT
Start: 2021-09-02 | End: 2022-01-05

## 2021-09-02 RX ORDER — OMEPRAZOLE 20 MG/1
20 CAPSULE, DELAYED RELEASE ORAL DAILY
Qty: 90 CAPSULE | Refills: 1 | Status: SHIPPED | OUTPATIENT
Start: 2021-09-02 | End: 2022-07-18 | Stop reason: ALTCHOICE

## 2021-09-09 ENCOUNTER — HOSPITAL ENCOUNTER (OUTPATIENT)
Dept: MAMMOGRAPHY | Age: 46
Discharge: HOME | End: 2021-09-09
Attending: INTERNAL MEDICINE
Payer: COMMERCIAL

## 2021-09-09 ENCOUNTER — TRANSCRIBE ORDER (OUTPATIENT)
Dept: MAMMOGRAPHY | Age: 46
End: 2021-09-09

## 2021-09-09 DIAGNOSIS — Z12.31 VISIT FOR SCREENING MAMMOGRAM: ICD-10-CM

## 2021-09-09 DIAGNOSIS — Z12.31 VISIT FOR SCREENING MAMMOGRAM: Primary | ICD-10-CM

## 2021-09-09 PROCEDURE — 77063 BREAST TOMOSYNTHESIS BI: CPT

## 2021-09-17 ENCOUNTER — OFFICE VISIT (OUTPATIENT)
Dept: INTERNAL MEDICINE CLINIC | Age: 46
End: 2021-09-17
Payer: COMMERCIAL

## 2021-09-17 VITALS
HEART RATE: 83 BPM | RESPIRATION RATE: 18 BRPM | OXYGEN SATURATION: 98 % | WEIGHT: 186 LBS | BODY MASS INDEX: 27.55 KG/M2 | HEIGHT: 69 IN | SYSTOLIC BLOOD PRESSURE: 132 MMHG | DIASTOLIC BLOOD PRESSURE: 80 MMHG | TEMPERATURE: 98.1 F

## 2021-09-17 DIAGNOSIS — S69.91XA INJURY OF RIGHT THUMB, INITIAL ENCOUNTER: Primary | ICD-10-CM

## 2021-09-17 DIAGNOSIS — Z87.898 H/O MULTIPLE PULMONARY NODULES: ICD-10-CM

## 2021-09-17 PROCEDURE — 99214 OFFICE O/P EST MOD 30 MIN: CPT | Performed by: INTERNAL MEDICINE

## 2021-09-17 NOTE — PROGRESS NOTES
Health Maintenance Due   Topic Date Due    Lipid Screen  08/03/2021    Flu Vaccine (1) 09/01/2021       Chief Complaint   Patient presents with    Elbow Pain     Pt states the right     Hand Injury     L Thumb     Hypertension       1. Have you been to the ER, urgent care clinic since your last visit? Hospitalized since your last visit? Yes, 9/13/21, L thumb injury     2. Have you seen or consulted any other health care providers outside of the [address] [place] [address] since your last visit? Include any pap smears or colon screening. No    3) Do you have an Advance Directive on file? no    4) Are you interested in receiving information on Advance Directives? NO      Patient is accompanied by self I have received verbal consent from Belén Mann to discuss any/all medical information while they are present in the room.

## 2021-09-18 NOTE — PROGRESS NOTES
HISTORY OF PRESENT ILLNESS  Meeta Jay is a 40 y.o. female. Patient reports that a couple of weeks ago she was trying to manipulate a bed, and it enclosed on her hand. It then effected her left thumb and created on small open area. Than has now healed. Reports that her hand was grabbed to hold and was very sensitive. She then went to [facility] were they completed xrays. It showed swelling. Today is a lot better, but does have an occasional tingling and pain. Is able to open and close.  are good. Has used a splint during this time and taken OTC. Also reports that pre COVID she was found to have pulmonary nodules. Was going to be referred to pulmonary, but it got held after the pandemic hit. Is a previous smoker.    Visit Vitals  /80 (BP 1 Location: Right upper arm, BP Patient Position: Sitting, BP Cuff Size: Adult)   Pulse 83   Temp 98.1 °F (36.7 °C) (Oral)   Resp 18   Ht 5' 9\" (1.753 m)   Wt 186 lb (84.4 kg)   LMP 03/18/2016   SpO2 98%   BMI 27.47 kg/m²     Past Medical History:   Diagnosis Date    Adverse effect of anesthesia     hard to wake up    Anxiety     Arthritis     back    Asperger syndrome     Asthma     Chronic pain 2009    Depression     Gastroparesis     GERD (gastroesophageal reflux disease) 2019    Hypercholesterolemia     Hypertension 2017    NO MEDS    IBD (inflammatory bowel disease)     Ill-defined condition     discomfort hips shoulders knees and feet and hands MAURICIO was positive    Ill-defined condition     GASTROPORISIS    Irritable bowel disease 1995    Liver disease 2008    DIAZ    Neurological disorder     Psychiatric disorder     anxiety and depression    Stroke ([facility] [address] 2017    TIA    Thyroid disease 2019    NO MEDS     Past Surgical History:   Procedure Laterality Date    HX GI      COLONOSCOPY    HX HEENT  10/2020    Immunotherapy shots    HX HYSTERECTOMY  4/14/16    da Dotty Robotic Total Laparoscopic Hysterectomy with bilateral  salpingectomy more than 250 grams. Cystoscopy.  HX ORTHOPAEDIC      Spinal fusion, back surgery,rods and screws    HX ORTHOPAEDIC      achilles lenghtening    HX ORTHOPAEDIC      laminectomy    HX OTHER SURGICAL      tumor removed from nose as child    HX OTHER SURGICAL      dental extraction    HX OTHER SURGICAL  02/12/2021    LAPAROSCOPIC  EXCISION OF ABDOMINAL WALL MASS     Family History   Problem Relation Age of Onset    Cancer Mother         Cervical    Delayed Awakening Mother     Heart Disease Father     Depression Father     Bleeding Prob Father         Low platelets    Elevated Lipids Father     Hypertension Father     Psychiatric Disorder Father     Alzheimer Maternal Grandmother     Diabetes Maternal Grandfather     Hypertension Maternal Grandfather     Lung Disease Maternal Grandfather         COPD    Heart Surgery Paternal Grandmother     Elevated Lipids Paternal Grandmother     Heart Disease Paternal Grandmother     Hypertension Paternal Grandmother     Heart Surgery Paternal Grandfather     Elevated Lipids Paternal Grandfather     Heart Disease Paternal Grandfather     Hypertension Paternal Grandfather     Asthma Sister     Headache Sister     Asthma Sister     Cancer Sister         Skin    Hypertension Sister     Psychiatric Disorder Sister     Elevated Lipids Paternal Uncle     Heart Disease Paternal Uncle     Hypertension Paternal Uncle     Psychiatric Disorder Paternal Uncle     Elevated Lipids Paternal Uncle     Psychiatric Disorder Paternal Uncle     Heart Disease Paternal Aunt     Hypertension Paternal Aunt     Psychiatric Disorder Paternal Aunt      Outpatient Encounter Medications as of 9/17/2021   Medication Sig Dispense Refill    traZODone (DESYREL) 100 mg tablet Take 1 Tablet by mouth nightly. 90 Tablet 0    omeprazole (PRILOSEC) 20 mg capsule Take 1 Capsule by mouth daily.  90 Capsule 1    lisinopriL (PRINIVIL, ZESTRIL) 5 mg tablet Take 1 Tablet by mouth daily. 60 Tablet 0    atorvastatin (LIPITOR) 20 mg tablet Take 1 Tablet by mouth nightly. FUTURE REFILLS MUST COME FROM NEW PCP - KATIE SKIFF NO LONGER AT PRACTICE 90 Tablet 0    LORazepam (ATIVAN) 1 mg tablet TAKE 1 TAB BY MOUTH TWO (2) TIMES A DAY AS NEEDED FOR ANXIETY. DO NOT USE DAILY 30 Tab 0    sertraline (ZOLOFT) 100 mg tablet TAKE 2 TABLETS BY MOUTH EVERY DAY (Patient taking differently: 100 mg. 1 TABLET HS) 180 Tab 2    OTHER Immunotherapy shots      albuterol (PROVENTIL HFA, VENTOLIN HFA, PROAIR HFA) 90 mcg/actuation inhaler Take 1 Puff by inhalation every six (6) hours as needed for Wheezing. 1 Inhaler 2    diclofenac (VOLTAREN) 1 % gel Apply 2 g to affected area four (4) times daily. (Patient not taking: Reported on 8/24/2021) 100 g 2    aspirin delayed-release 81 mg tablet Take  by mouth daily.  CETIRIZINE HCL (ZYRTEC PO) Take 10 mg by mouth two (2) times a day. (Patient not taking: Reported on 8/24/2021)       No facility-administered encounter medications on file as of 9/17/2021. HPI    Review of Systems   Constitutional: Negative. Respiratory: Negative. Cardiovascular: Negative. Musculoskeletal: Positive for joint pain. Neurological: Positive for tingling. Negative for sensory change. Physical Exam  Vitals and nursing note reviewed. Cardiovascular:      Rate and Rhythm: Normal rate and regular rhythm. Pulmonary:      Effort: Pulmonary effort is normal.      Breath sounds: Normal breath sounds. Musculoskeletal:      Left hand: No swelling. Normal range of motion. Normal strength. Skin:     General: Skin is warm. Neurological:      Mental Status: She is alert and oriented to person, place, and time. ASSESSMENT and PLAN  Diagnoses and all orders for this visit:    1. Injury of right thumb, initial encounter    2.  H/O multiple pulmonary nodules  -     REFERRAL TO PULMONARY DISEASE        -     Will seek previous CT scans to compare     Follow-up and Dispositions    · Return if symptoms worsen or fail to improve, for Follow up.       lab results and schedule of future lab studies reviewed with patient  reviewed medications and side effects in detail

## 2021-10-21 DIAGNOSIS — E78.5 HYPERLIPIDEMIA, UNSPECIFIED HYPERLIPIDEMIA TYPE: ICD-10-CM

## 2021-10-21 DIAGNOSIS — I10 ESSENTIAL HYPERTENSION: ICD-10-CM

## 2021-10-21 RX ORDER — LISINOPRIL 5 MG/1
TABLET ORAL
Qty: 60 TABLET | Refills: 0 | Status: SHIPPED | OUTPATIENT
Start: 2021-10-21 | End: 2021-12-13

## 2021-10-21 RX ORDER — ATORVASTATIN CALCIUM 20 MG/1
TABLET, FILM COATED ORAL
Qty: 90 TABLET | Refills: 0 | OUTPATIENT
Start: 2021-10-21

## 2021-10-21 RX ORDER — ATORVASTATIN CALCIUM 20 MG/1
20 TABLET, FILM COATED ORAL
Qty: 90 TABLET | Refills: 0 | Status: SHIPPED | OUTPATIENT
Start: 2021-10-21 | End: 2021-12-03 | Stop reason: SDUPTHER

## 2021-11-16 DIAGNOSIS — F43.10 POST-TRAUMATIC STRESS DISORDER, UNSPECIFIED: ICD-10-CM

## 2021-11-16 DIAGNOSIS — J45.20 MILD INTERMITTENT ASTHMA WITHOUT COMPLICATION: ICD-10-CM

## 2021-11-16 RX ORDER — ALBUTEROL SULFATE 90 UG/1
1 AEROSOL, METERED RESPIRATORY (INHALATION)
Qty: 1 EACH | Refills: 1 | Status: SHIPPED | OUTPATIENT
Start: 2021-11-16 | End: 2022-05-09

## 2021-11-16 RX ORDER — SERTRALINE HYDROCHLORIDE 100 MG/1
200 TABLET, FILM COATED ORAL DAILY
Qty: 180 TABLET | Refills: 2 | Status: SHIPPED | OUTPATIENT
Start: 2021-11-16 | End: 2022-08-02 | Stop reason: SDUPTHER

## 2021-12-03 ENCOUNTER — PATIENT MESSAGE (OUTPATIENT)
Dept: INTERNAL MEDICINE CLINIC | Age: 46
End: 2021-12-03

## 2021-12-03 DIAGNOSIS — E78.5 HYPERLIPIDEMIA, UNSPECIFIED HYPERLIPIDEMIA TYPE: ICD-10-CM

## 2021-12-03 DIAGNOSIS — F41.9 ANXIETY: ICD-10-CM

## 2021-12-03 RX ORDER — LORAZEPAM 1 MG/1
TABLET ORAL
Qty: 30 TABLET | Refills: 0 | Status: SHIPPED | OUTPATIENT
Start: 2021-12-03 | End: 2022-08-02 | Stop reason: SDUPTHER

## 2021-12-03 RX ORDER — ATORVASTATIN CALCIUM 20 MG/1
20 TABLET, FILM COATED ORAL
Qty: 90 TABLET | Refills: 0 | Status: SHIPPED | OUTPATIENT
Start: 2021-12-03 | End: 2022-05-31

## 2021-12-13 DIAGNOSIS — I10 ESSENTIAL HYPERTENSION: ICD-10-CM

## 2021-12-13 RX ORDER — LISINOPRIL 5 MG/1
TABLET ORAL
Qty: 60 TABLET | Refills: 0 | Status: SHIPPED | OUTPATIENT
Start: 2021-12-13 | End: 2022-02-05

## 2022-01-04 DIAGNOSIS — G47.00 INSOMNIA, UNSPECIFIED TYPE: ICD-10-CM

## 2022-01-05 RX ORDER — TRAZODONE HYDROCHLORIDE 100 MG/1
TABLET ORAL
Qty: 90 TABLET | Refills: 0 | Status: SHIPPED | OUTPATIENT
Start: 2022-01-05 | End: 2022-04-05

## 2022-02-09 LAB — SARS-COV-2, NAA: NOT DETECTED

## 2022-03-18 PROBLEM — G89.18 POST-OP PAIN: Status: ACTIVE | Noted: 2021-03-17

## 2022-03-18 PROBLEM — E83.52 HYPERCALCEMIA: Status: ACTIVE | Noted: 2019-06-19

## 2022-03-18 PROBLEM — R74.8 ELEVATED LIVER ENZYMES: Status: ACTIVE | Noted: 2020-10-12

## 2022-03-19 PROBLEM — R92.8 ABNORMAL MAMMOGRAM: Status: ACTIVE | Noted: 2017-12-19

## 2022-03-19 PROBLEM — R03.0 ELEVATED BP WITHOUT DIAGNOSIS OF HYPERTENSION: Status: ACTIVE | Noted: 2017-09-18

## 2022-03-19 PROBLEM — E66.9 OBESE: Status: ACTIVE | Noted: 2019-08-27

## 2022-03-19 PROBLEM — R22.2 ABDOMINAL WALL MASS: Status: ACTIVE | Noted: 2021-01-29

## 2022-03-19 PROBLEM — G45.9 TRANSIENT ISCHEMIC ATTACK: Status: ACTIVE | Noted: 2020-10-12

## 2022-03-19 PROBLEM — Z91.014 ALLERGY TO BEEF: Status: ACTIVE | Noted: 2021-02-19

## 2022-03-19 PROBLEM — I10 HTN (HYPERTENSION): Status: ACTIVE | Noted: 2020-10-12

## 2022-03-19 PROBLEM — E03.9 ACQUIRED HYPOTHYROIDISM: Status: ACTIVE | Noted: 2019-10-08

## 2022-03-19 PROBLEM — K75.81 NONALCOHOLIC STEATOHEPATITIS (NASH): Status: ACTIVE | Noted: 2020-10-12

## 2022-03-19 PROBLEM — K58.0 IRRITABLE BOWEL SYNDROME WITH DIARRHEA: Status: ACTIVE | Noted: 2017-11-29

## 2022-03-19 PROBLEM — K31.84 GASTROPARESIS: Status: ACTIVE | Noted: 2020-10-12

## 2022-03-20 PROBLEM — K76.0 NAFL (NONALCOHOLIC FATTY LIVER): Status: ACTIVE | Noted: 2019-10-18

## 2022-03-20 PROBLEM — F43.10 PTSD (POST-TRAUMATIC STRESS DISORDER): Status: ACTIVE | Noted: 2017-11-29

## 2022-03-25 ENCOUNTER — TRANSCRIBE ORDER (OUTPATIENT)
Dept: SCHEDULING | Age: 47
End: 2022-03-25

## 2022-03-25 DIAGNOSIS — R91.1 LUNG NODULE: Primary | ICD-10-CM

## 2022-04-05 ENCOUNTER — HOSPITAL ENCOUNTER (OUTPATIENT)
Dept: CT IMAGING | Age: 47
Discharge: HOME | End: 2022-04-05
Attending: INTERNAL MEDICINE
Payer: COMMERCIAL

## 2022-04-05 DIAGNOSIS — R91.1 LUNG NODULE: ICD-10-CM

## 2022-04-05 DIAGNOSIS — G47.00 INSOMNIA, UNSPECIFIED TYPE: ICD-10-CM

## 2022-04-05 PROCEDURE — 71250 CT THORAX DX C-: CPT

## 2022-04-05 RX ORDER — TRAZODONE HYDROCHLORIDE 100 MG/1
TABLET ORAL
Qty: 90 TABLET | Refills: 0 | Status: SHIPPED | OUTPATIENT
Start: 2022-04-05 | End: 2022-07-03

## 2022-05-08 DIAGNOSIS — J45.20 MILD INTERMITTENT ASTHMA WITHOUT COMPLICATION: ICD-10-CM

## 2022-05-09 RX ORDER — ALBUTEROL SULFATE 90 UG/1
AEROSOL, METERED RESPIRATORY (INHALATION)
Qty: 6.7 EACH | Refills: 1 | Status: SHIPPED | OUTPATIENT
Start: 2022-05-09

## 2022-05-31 DIAGNOSIS — E78.5 HYPERLIPIDEMIA, UNSPECIFIED HYPERLIPIDEMIA TYPE: ICD-10-CM

## 2022-05-31 RX ORDER — ATORVASTATIN CALCIUM 20 MG/1
20 TABLET, FILM COATED ORAL
Qty: 90 TABLET | Refills: 0 | Status: SHIPPED | OUTPATIENT
Start: 2022-05-31 | End: 2022-09-06

## 2022-07-03 DIAGNOSIS — G47.00 INSOMNIA, UNSPECIFIED TYPE: ICD-10-CM

## 2022-07-03 RX ORDER — TRAZODONE HYDROCHLORIDE 100 MG/1
TABLET ORAL
Qty: 90 TABLET | Refills: 0 | Status: SHIPPED | OUTPATIENT
Start: 2022-07-03 | End: 2022-10-08

## 2022-07-18 ENCOUNTER — OFFICE VISIT (OUTPATIENT)
Dept: INTERNAL MEDICINE CLINIC | Age: 47
End: 2022-07-18
Payer: COMMERCIAL

## 2022-07-18 VITALS
WEIGHT: 197.2 LBS | RESPIRATION RATE: 16 BRPM | TEMPERATURE: 98.2 F | OXYGEN SATURATION: 99 % | SYSTOLIC BLOOD PRESSURE: 122 MMHG | DIASTOLIC BLOOD PRESSURE: 70 MMHG | BODY MASS INDEX: 29.21 KG/M2 | HEART RATE: 74 BPM | HEIGHT: 69 IN

## 2022-07-18 DIAGNOSIS — I10 ESSENTIAL HYPERTENSION: ICD-10-CM

## 2022-07-18 DIAGNOSIS — Z00.00 WELL ADULT EXAM: Primary | ICD-10-CM

## 2022-07-18 DIAGNOSIS — F41.9 ANXIETY: ICD-10-CM

## 2022-07-18 DIAGNOSIS — E78.5 HYPERLIPIDEMIA, UNSPECIFIED HYPERLIPIDEMIA TYPE: ICD-10-CM

## 2022-07-18 DIAGNOSIS — E55.9 VITAMIN D DEFICIENCY: ICD-10-CM

## 2022-07-18 DIAGNOSIS — M21.372 LEFT FOOT DROP: ICD-10-CM

## 2022-07-18 PROCEDURE — 99214 OFFICE O/P EST MOD 30 MIN: CPT | Performed by: INTERNAL MEDICINE

## 2022-07-18 PROCEDURE — 90471 IMMUNIZATION ADMIN: CPT | Performed by: INTERNAL MEDICINE

## 2022-07-18 PROCEDURE — 90677 PCV20 VACCINE IM: CPT | Performed by: INTERNAL MEDICINE

## 2022-07-18 NOTE — PROGRESS NOTES
HISTORY OF PRESENT ILLNESS  Dannielle Stroud is a 56 y.o. female. Patient was seen for a follow up. Reports that she was seen by neuro for her foot drop. EMG testing was negative after her TIA. She was fitted for a brace and this has drastically improved her back pain and hip pain. When off she is having some gait instability. Would like to get second dose of PNA vaccine. Will need her lab work repeated. Lipids were elevated last time. Would like to consider coming off her Zoloft. Has been on thi since her TIA. Does feel like she is anxious, but will like to try weaning off the medications. Takes her lorazepam PRN. Visit Vitals  /70 (BP 1 Location: Left upper arm, BP Patient Position: Sitting, BP Cuff Size: Adult)   Pulse 74   Temp 98.2 °F (36.8 °C) (Oral)   Resp 16   Ht 5' 9\" (1.753 m)   Wt 197 lb 3.2 oz (89.4 kg)   LMP 03/18/2016   SpO2 99%   BMI 29.12 kg/m²     Past Medical History:   Diagnosis Date    Adverse effect of anesthesia     hard to wake up    Anxiety     Arthritis     back    Asperger syndrome     Asthma     Chronic pain 2009    Depression     Gastroparesis     GERD (gastroesophageal reflux disease) 2019    Hypercholesterolemia     Hypertension 2017    NO MEDS    IBD (inflammatory bowel disease)     Ill-defined condition     discomfort hips shoulders knees and feet and hands MAURICIO was positive    Ill-defined condition     GASTROPORISIS    Irritable bowel disease 1995    Liver disease 2008    DIAZ    Neurological disorder     Psychiatric disorder     anxiety and depression    Stroke ([facility] [address] 2017    TIA    Thyroid disease 2019    NO MEDS     Past Surgical History:   Procedure Laterality Date    HX GI      COLONOSCOPY    HX HEENT  10/2020    Immunotherapy shots    HX HYSTERECTOMY  4/14/16    da Dotty Robotic Total Laparoscopic Hysterectomy with bilateral  salpingectomy more than 250 grams. Cystoscopy.     HX ORTHOPAEDIC      Spinal fusion, back surgery,rods and screws    HX ORTHOPAEDIC      achilles lenghtening    HX ORTHOPAEDIC      laminectomy    HX OTHER SURGICAL      tumor removed from nose as child    HX OTHER SURGICAL      dental extraction    HX OTHER SURGICAL  02/12/2021    LAPAROSCOPIC  EXCISION OF ABDOMINAL WALL MASS     Family History   Problem Relation Age of Onset    Cancer Mother         Cervical    Delayed Awakening Mother     Heart Disease Father     Depression Father     Bleeding Prob Father         Low platelets    Elevated Lipids Father     Hypertension Father     Psychiatric Disorder Father     Alzheimer's Disease Maternal Grandmother     Diabetes Maternal Grandfather     Hypertension Maternal Grandfather     Lung Disease Maternal Grandfather         COPD    Heart Surgery Paternal Grandmother     Elevated Lipids Paternal Grandmother     Heart Disease Paternal Grandmother     Hypertension Paternal Grandmother     Heart Surgery Paternal Grandfather     Elevated Lipids Paternal Grandfather     Heart Disease Paternal Grandfather     Hypertension Paternal Grandfather     Asthma Sister     Headache Sister     Asthma Sister     Cancer Sister         Skin    Hypertension Sister     Psychiatric Disorder Sister     Elevated Lipids Paternal Uncle     Heart Disease Paternal Uncle     Hypertension Paternal Uncle     Psychiatric Disorder Paternal Uncle     Elevated Lipids Paternal Uncle     Psychiatric Disorder Paternal Uncle     Heart Disease Paternal Aunt     Hypertension Paternal Aunt     Psychiatric Disorder Paternal Aunt      Outpatient Encounter Medications as of 7/18/2022   Medication Sig Dispense Refill    traZODone (DESYREL) 100 mg tablet TAKE 1 TABLET BY MOUTH EVERY DAY AT NIGHT 90 Tablet 0    atorvastatin (LIPITOR) 20 mg tablet TAKE 1 TABLET BY MOUTH NIGHTLY.  FUTURE REFILLS MUST COME FROM NEW PCP - KATIE SKIFF NO LONGER AT PRACTICE 90 Tablet 0    albuterol (PROVENTIL HFA, VENTOLIN HFA, PROAIR HFA) 90 mcg/actuation inhaler TAKE 1 PUFF BY INHALATION EVERY SIX HOURS AS NEEDED FOR WHEEZING. 6.7 Each 1    LORazepam (ATIVAN) 1 mg tablet TAKE 1 TAB BY MOUTH TWO (2) TIMES A DAY AS NEEDED FOR ANXIETY. DO NOT USE DAILY 30 Tablet 0    sertraline (ZOLOFT) 100 mg tablet Take 2 Tablets by mouth daily. 180 Tablet 2    aspirin delayed-release 81 mg tablet Take  by mouth daily.  CETIRIZINE HCL (ZYRTEC PO) Take 10 mg by mouth two (2) times a day.  [DISCONTINUED] lisinopriL (PRINIVIL, ZESTRIL) 5 mg tablet TAKE 1 TABLET BY MOUTH EVERY DAY 90 Tablet 1    [DISCONTINUED] omeprazole (PRILOSEC) 20 mg capsule Take 1 Capsule by mouth daily. 90 Capsule 1    [DISCONTINUED] OTHER Immunotherapy shots      [DISCONTINUED] diclofenac (VOLTAREN) 1 % gel Apply 2 g to affected area four (4) times daily. (Patient not taking: Reported on 8/24/2021) 100 g 2     No facility-administered encounter medications on file as of 7/18/2022. HPI    Review of Systems   Constitutional: Negative. Respiratory: Negative. Cardiovascular: Negative. Gastrointestinal: Negative. Musculoskeletal: Positive for joint pain. Neurological: Negative. Psychiatric/Behavioral: The patient is nervous/anxious. Physical Exam  Vitals and nursing note reviewed. Cardiovascular:      Rate and Rhythm: Normal rate and regular rhythm. Pulmonary:      Effort: Pulmonary effort is normal.      Breath sounds: Normal breath sounds. Abdominal:      General: Bowel sounds are normal.      Palpations: Abdomen is soft. Musculoskeletal:      Comments: Patient with brace on left foot    Skin:     General: Skin is warm. Neurological:      Mental Status: She is alert and oriented to person, place, and time. Psychiatric:         Behavior: Behavior normal.         ASSESSMENT and PLAN  Diagnoses and all orders for this visit:    1. Well adult exam  -     METABOLIC PANEL, COMPREHENSIVE; Future  -     CBC WITH AUTOMATED DIFF;  Future  -     PNEUMOCOCCAL, PCV20, PREVNAR 20, (AGE 18 YRS+), IM, PF    2. Hyperlipidemia, unspecified hyperlipidemia type  -     LIPID PANEL; Future    3. Essential hypertension    4. Vitamin D deficiency  -     VITAMIN D, 25 HYDROXY; Future    5. BMI 29.0-29.9,adult  -     HEMOGLOBIN A1C WITH EAG; Future    6. Left foot drop        -     Being followed by [facility]        -     Patient with a brace on   7.  Anxiety        -     Wants to follow up with psych.         -     If considering reducing Zoloft, 50 mg decrease every 2 weeks as tolerated     Follow-up and Dispositions    · Return in about 6 months (around 1/18/2023), or if symptoms worsen or fail to improve.       lab results and schedule of future lab studies reviewed with patient  reviewed diet, exercise and weight control  cardiovascular risk and specific lipid/LDL goals reviewed  reviewed medications and side effects in detail

## 2022-07-19 LAB
25(OH)D3+25(OH)D2 SERPL-MCNC: 17.9 NG/ML (ref 30–100)
ALBUMIN SERPL-MCNC: 5.1 G/DL (ref 3.8–4.8)
ALBUMIN/GLOB SERPL: 2 {RATIO} (ref 1.2–2.2)
ALP SERPL-CCNC: 89 IU/L (ref 44–121)
ALT SERPL-CCNC: 50 IU/L (ref 0–32)
AST SERPL-CCNC: 26 IU/L (ref 0–40)
BASOPHILS # BLD AUTO: 0.1 X10E3/UL (ref 0–0.2)
BASOPHILS NFR BLD AUTO: 1 %
BILIRUB SERPL-MCNC: 0.9 MG/DL (ref 0–1.2)
BUN SERPL-MCNC: 10 MG/DL (ref 6–24)
BUN/CREAT SERPL: 13 (ref 9–23)
CALCIUM SERPL-MCNC: 10.3 MG/DL (ref 8.7–10.2)
CHLORIDE SERPL-SCNC: 102 MMOL/L (ref 96–106)
CHOLEST SERPL-MCNC: 232 MG/DL (ref 100–199)
CO2 SERPL-SCNC: 22 MMOL/L (ref 20–29)
CREAT SERPL-MCNC: 0.78 MG/DL (ref 0.57–1)
EGFR: 95 ML/MIN/1.73
EOSINOPHIL # BLD AUTO: 0.4 X10E3/UL (ref 0–0.4)
EOSINOPHIL NFR BLD AUTO: 4 %
ERYTHROCYTE [DISTWIDTH] IN BLOOD BY AUTOMATED COUNT: 13.1 % (ref 11.7–15.4)
EST. AVERAGE GLUCOSE BLD GHB EST-MCNC: 117 MG/DL
GLOBULIN SER CALC-MCNC: 2.6 G/DL (ref 1.5–4.5)
GLUCOSE SERPL-MCNC: 91 MG/DL (ref 65–99)
HBA1C MFR BLD: 5.7 % (ref 4.8–5.6)
HCT VFR BLD AUTO: 45.5 % (ref 34–46.6)
HDLC SERPL-MCNC: 42 MG/DL
HGB BLD-MCNC: 15.5 G/DL (ref 11.1–15.9)
IMM GRANULOCYTES # BLD AUTO: 0 X10E3/UL (ref 0–0.1)
IMM GRANULOCYTES NFR BLD AUTO: 0 %
IMP & REVIEW OF LAB RESULTS: NORMAL
LDLC SERPL CALC-MCNC: 121 MG/DL (ref 0–99)
LYMPHOCYTES # BLD AUTO: 2.8 X10E3/UL (ref 0.7–3.1)
LYMPHOCYTES NFR BLD AUTO: 29 %
MCH RBC QN AUTO: 29.1 PG (ref 26.6–33)
MCHC RBC AUTO-ENTMCNC: 34.1 G/DL (ref 31.5–35.7)
MCV RBC AUTO: 86 FL (ref 79–97)
MONOCYTES # BLD AUTO: 0.8 X10E3/UL (ref 0.1–0.9)
MONOCYTES NFR BLD AUTO: 8 %
NEUTROPHILS # BLD AUTO: 5.6 X10E3/UL (ref 1.4–7)
NEUTROPHILS NFR BLD AUTO: 58 %
PLATELET # BLD AUTO: 262 X10E3/UL (ref 150–450)
POTASSIUM SERPL-SCNC: 4.7 MMOL/L (ref 3.5–5.2)
PROT SERPL-MCNC: 7.7 G/DL (ref 6–8.5)
RBC # BLD AUTO: 5.32 X10E6/UL (ref 3.77–5.28)
SODIUM SERPL-SCNC: 141 MMOL/L (ref 134–144)
TRIGL SERPL-MCNC: 393 MG/DL (ref 0–149)
VLDLC SERPL CALC-MCNC: 69 MG/DL (ref 5–40)
WBC # BLD AUTO: 9.7 X10E3/UL (ref 3.4–10.8)

## 2022-07-19 NOTE — PROGRESS NOTES
Lipids have gone back up. Really needs to consider increasing statin and really working on fat, starch, and fats intake   ALT elevated. Is she taking in tylenol or alcohol regularly? A1c is showing pre DM. Has to be mindful of carb and sugar control . Vitamin d low, please send in replacement for 12 weeks, weekly.  [id number]

## 2022-07-21 NOTE — PROGRESS NOTES
Health Maintenance Due   Topic Date Due   â¢ Hepatitis A Vaccine (1 of 2 - 2-dose series) Never done   â¢ COVID-19 Vaccine (1) Never done   â¢ Varicella Vaccine (1 of 2 - 2-dose childhood series) Never done   â¢ Annual Physical (ages 2-20)  11/23/2021       Patient is due for topics listed above, he wishes to proceed with Immunization(s) Hep A and Well Child Exam, but is not proceeding with Immunization(s) COVID-19 and Varicella at this time.  The following has occurred: Latent TB testing is normal (no infection), thyroid labs normal. Blood chemistry normal.  The only thing off is your blood ferritin level is elevated. Are you taking iron supplements? Your eosinophils were also slightly high but that just means allergies.

## 2022-08-02 DIAGNOSIS — F41.9 ANXIETY: ICD-10-CM

## 2022-08-02 DIAGNOSIS — F43.10 POST-TRAUMATIC STRESS DISORDER, UNSPECIFIED: ICD-10-CM

## 2022-08-02 RX ORDER — SERTRALINE HYDROCHLORIDE 100 MG/1
200 TABLET, FILM COATED ORAL DAILY
Qty: 180 TABLET | Refills: 2 | Status: SHIPPED | OUTPATIENT
Start: 2022-08-02 | End: 2022-08-02 | Stop reason: SDUPTHER

## 2022-08-02 RX ORDER — LORAZEPAM 1 MG/1
TABLET ORAL
Qty: 30 TABLET | Refills: 0 | Status: SHIPPED | OUTPATIENT
Start: 2022-08-02 | End: 2022-08-02 | Stop reason: SDUPTHER

## 2022-08-02 RX ORDER — LORAZEPAM 1 MG/1
TABLET ORAL
Qty: 30 TABLET | Refills: 0 | Status: SHIPPED | OUTPATIENT
Start: 2022-08-02 | End: 2022-10-04 | Stop reason: SDUPTHER

## 2022-08-02 RX ORDER — SERTRALINE HYDROCHLORIDE 100 MG/1
200 TABLET, FILM COATED ORAL DAILY
Qty: 180 TABLET | Refills: 2 | Status: SHIPPED | OUTPATIENT
Start: 2022-08-02 | End: 2022-11-03 | Stop reason: SDUPTHER

## 2022-09-06 DIAGNOSIS — E78.5 HYPERLIPIDEMIA, UNSPECIFIED HYPERLIPIDEMIA TYPE: ICD-10-CM

## 2022-09-06 RX ORDER — ATORVASTATIN CALCIUM 20 MG/1
TABLET, FILM COATED ORAL
Qty: 90 TABLET | Refills: 0 | Status: SHIPPED | OUTPATIENT
Start: 2022-09-06

## 2022-09-27 ENCOUNTER — TRANSCRIBE ORDER (OUTPATIENT)
Dept: SCHEDULING | Age: 47
End: 2022-09-27

## 2022-09-27 DIAGNOSIS — Z12.31 ENCOUNTER FOR MAMMOGRAM TO ESTABLISH BASELINE MAMMOGRAM: Primary | ICD-10-CM

## 2022-10-04 DIAGNOSIS — F41.9 ANXIETY: ICD-10-CM

## 2022-10-04 RX ORDER — LORAZEPAM 1 MG/1
TABLET ORAL
Qty: 30 TABLET | Refills: 0 | Status: SHIPPED | OUTPATIENT
Start: 2022-10-04

## 2022-10-08 DIAGNOSIS — G47.00 INSOMNIA, UNSPECIFIED TYPE: ICD-10-CM

## 2022-10-08 RX ORDER — TRAZODONE HYDROCHLORIDE 100 MG/1
TABLET ORAL
Qty: 90 TABLET | Refills: 0 | Status: SHIPPED | OUTPATIENT
Start: 2022-10-08

## 2022-11-03 DIAGNOSIS — F43.10 POST-TRAUMATIC STRESS DISORDER, UNSPECIFIED: ICD-10-CM

## 2022-11-03 RX ORDER — SERTRALINE HYDROCHLORIDE 100 MG/1
200 TABLET, FILM COATED ORAL DAILY
Qty: 180 TABLET | Refills: 2 | Status: SHIPPED | OUTPATIENT
Start: 2022-11-03

## 2022-11-07 ENCOUNTER — HOSPITAL ENCOUNTER (OUTPATIENT)
Dept: MAMMOGRAPHY | Age: 47
Discharge: HOME | End: 2022-11-07
Attending: INTERNAL MEDICINE
Payer: COMMERCIAL

## 2022-11-07 DIAGNOSIS — Z12.31 ENCOUNTER FOR MAMMOGRAM TO ESTABLISH BASELINE MAMMOGRAM: ICD-10-CM

## 2022-11-07 PROCEDURE — 77063 BREAST TOMOSYNTHESIS BI: CPT

## 2022-11-15 DIAGNOSIS — E78.5 HYPERLIPIDEMIA, UNSPECIFIED HYPERLIPIDEMIA TYPE: ICD-10-CM

## 2022-11-15 RX ORDER — ATORVASTATIN CALCIUM 20 MG/1
20 TABLET, FILM COATED ORAL DAILY
Qty: 90 TABLET | Refills: 1 | Status: SHIPPED | OUTPATIENT
Start: 2022-11-15

## 2022-11-15 NOTE — TELEPHONE ENCOUNTER
Requested Prescriptions     Pending Prescriptions Disp Refills    atorvastatin (LIPITOR) 20 mg tablet 90 Tablet 0       Allergies   Allergen Reactions    Latex Rash     cellulitis    Sulfa (Sulfonamide Antibiotics) Anaphylaxis    Morphine Rash    Neomycin-Bacitracin-Polymyxin Other (comments)    Neosporin [Benzalkonium Chloride] Rash    Adhesive Tape-Silicones Rash       Last visit with ordering provider: 7/18/22  Next visit with ordering provider: none on file   Is order duplicate: no    Current Outpatient Medications   Medication Instructions    albuterol (PROVENTIL HFA, VENTOLIN HFA, PROAIR HFA) 90 mcg/actuation inhaler TAKE 1 PUFF BY INHALATION EVERY SIX HOURS AS NEEDED FOR WHEEZING. aspirin delayed-release 81 mg tablet Oral, DAILY    atorvastatin (LIPITOR) 20 mg tablet TAKE 1 TABLET NIGHTLY. FUTURE REFILLS MUST COME FROM NEW PCP - KATIE SKIFF NO LONGER AT PRACTICE    CETIRIZINE HCL (ZYRTEC PO) 10 mg, Oral, 2 TIMES DAILY    LORazepam (ATIVAN) 1 mg tablet TAKE 1 TAB BY MOUTH TWO (2) TIMES A DAY AS NEEDED FOR ANXIETY.  DO NOT USE DAILY    sertraline (ZOLOFT) 200 mg, Oral, DAILY    traZODone (DESYREL) 100 mg tablet TAKE 1 TABLET BY MOUTH EVERY DAY AT NIGHT       Signed By: Arti Young     November 15, 2022

## 2023-01-06 DIAGNOSIS — G47.00 INSOMNIA, UNSPECIFIED TYPE: ICD-10-CM

## 2023-01-06 RX ORDER — TRAZODONE HYDROCHLORIDE 100 MG/1
TABLET ORAL
Qty: 90 TABLET | Refills: 0 | Status: SHIPPED | OUTPATIENT
Start: 2023-01-06

## 2023-01-31 ENCOUNTER — TRANSCRIBE ORDER (OUTPATIENT)
Dept: SCHEDULING | Age: 48
End: 2023-01-31

## 2023-01-31 DIAGNOSIS — S66.809A INTERMETACARPAL LIGAMENT INJURY: Primary | ICD-10-CM

## 2023-01-31 DIAGNOSIS — M72.2 PLANTAR FIBROMATOSIS: ICD-10-CM

## 2023-02-04 ENCOUNTER — HOSPITAL ENCOUNTER (OUTPATIENT)
Dept: MRI IMAGING | Age: 48
End: 2023-02-04
Attending: PODIATRIST
Payer: COMMERCIAL

## 2023-02-04 DIAGNOSIS — M72.2 PLANTAR FIBROMATOSIS: ICD-10-CM

## 2023-02-04 DIAGNOSIS — S66.809A INTERMETACARPAL LIGAMENT INJURY: ICD-10-CM

## 2023-02-04 PROCEDURE — 73718 MRI LOWER EXTREMITY W/O DYE: CPT

## 2023-03-08 DIAGNOSIS — F41.9 ANXIETY: ICD-10-CM

## 2023-03-09 RX ORDER — LORAZEPAM 1 MG/1
TABLET ORAL
Qty: 30 TABLET | Refills: 0 | Status: SHIPPED | OUTPATIENT
Start: 2023-03-09

## 2023-03-21 ENCOUNTER — OFFICE VISIT (OUTPATIENT)
Dept: INTERNAL MEDICINE CLINIC | Age: 48
End: 2023-03-21
Payer: COMMERCIAL

## 2023-03-21 ENCOUNTER — HOSPITAL ENCOUNTER (OUTPATIENT)
Dept: NON INVASIVE DIAGNOSTICS | Age: 48
Discharge: HOME | End: 2023-03-21
Payer: COMMERCIAL

## 2023-03-21 ENCOUNTER — HOSPITAL ENCOUNTER (OUTPATIENT)
Dept: GENERAL RADIOLOGY | Age: 48
Discharge: HOME | End: 2023-03-21
Payer: COMMERCIAL

## 2023-03-21 VITALS
HEART RATE: 101 BPM | DIASTOLIC BLOOD PRESSURE: 80 MMHG | SYSTOLIC BLOOD PRESSURE: 125 MMHG | RESPIRATION RATE: 18 BRPM | HEIGHT: 69 IN | BODY MASS INDEX: 28.58 KG/M2 | WEIGHT: 193 LBS

## 2023-03-21 DIAGNOSIS — E78.2 MIXED HYPERLIPIDEMIA: ICD-10-CM

## 2023-03-21 DIAGNOSIS — K57.90 DIVERTICULOSIS: ICD-10-CM

## 2023-03-21 DIAGNOSIS — I10 ESSENTIAL HYPERTENSION: ICD-10-CM

## 2023-03-21 DIAGNOSIS — Z01.818 PRE-OP EXAM: ICD-10-CM

## 2023-03-21 DIAGNOSIS — Z86.73 HISTORY OF TRANSIENT ISCHEMIC ATTACK (TIA): ICD-10-CM

## 2023-03-21 DIAGNOSIS — K58.0 IRRITABLE BOWEL SYNDROME WITH DIARRHEA: ICD-10-CM

## 2023-03-21 DIAGNOSIS — Z01.818 PRE-OP EXAM: Primary | ICD-10-CM

## 2023-03-21 PROCEDURE — 99214 OFFICE O/P EST MOD 30 MIN: CPT | Performed by: INTERNAL MEDICINE

## 2023-03-21 PROCEDURE — 71046 X-RAY EXAM CHEST 2 VIEWS: CPT

## 2023-03-21 PROCEDURE — 93005 ELECTROCARDIOGRAM TRACING: CPT

## 2023-03-21 NOTE — PROGRESS NOTES
Identified pt with two pt identifiers(name and ). Reviewed record in preparation for visit and have obtained necessary documentation. All patient medications has been reviewed. Chief Complaint   Patient presents with    Pre-op Exam       3 most recent PHQ Screens 2022   PHQ Not Done -   Little interest or pleasure in doing things Several days   Feeling down, depressed, irritable, or hopeless Several days   Total Score PHQ 2 2   Trouble falling or staying asleep, or sleeping too much -   Feeling tired or having little energy -   Poor appetite, weight loss, or overeating -   Feeling bad about yourself - or that you are a failure or have let yourself or your family down -   Trouble concentrating on things such as school, work, reading, or watching TV -   Moving or speaking so slowly that other people could have noticed; or the opposite being so fidgety that others notice -   Thoughts of being better off dead, or hurting yourself in some way -   PHQ 9 Score -   How difficult have these problems made it for you to do your work, take care of your home and get along with others -     Abuse Screening Questionnaire 2022   Do you ever feel afraid of your partner? N   Are you in a relationship with someone who physically or mentally threatens you? N   Is it safe for you to go home? Y       Health Maintenance Due   Topic    COVID-19 Vaccine (3 - Booster for Moderna series)     Health Maintenance Review: Patient reminded of \"due or due soon\" health maintenance. I have asked the patient to contact his/her primary care provider (PCP) for follow-up on his/her health maintenance. There were no vitals filed for this visit.     Wt Readings from Last 3 Encounters:   22 197 lb 3.2 oz (89.4 kg)   21 186 lb (84.4 kg)   21 178 lb (80.7 kg)     Temp Readings from Last 3 Encounters:   22 98.2 °F (36.8 °C) (Oral)   21 98.1 °F (36.7 °C) (Oral)   21 98 °F (36.7 °C) (Oral)     BP Readings from Last 3 Encounters:   07/18/22 122/70   09/17/21 132/80   08/24/21 (!) 146/84     Pulse Readings from Last 3 Encounters:   07/18/22 74   09/17/21 83   08/24/21 (!) 102       1. \"Have you been to the ER, urgent care clinic since your last visit? Hospitalized since your last visit? \" No    2. \"Have you seen or consulted any other health care providers outside of the [address] [place] [address] since your last visit? \" No

## 2023-03-21 NOTE — PROGRESS NOTES
HISTORY OF PRESENT ILLNESS  Bev Waterman is a 56 y.o. female. Patient was seen for a pre op exam. Is going to have plantar pain fixed in the coming weeks. She has had no falls, but extreme pain to the bottom of the foot. Patient with a history of a TIA so will complete chest xray and EKG for follow up. Also need of a CMP and CBC. Is not on any blood thinners. Allergies have been reviewed. BP today is stable and on a DASH diet. Saw in the last few months. Scope done confirmed diverticular inflammation and IBS. Pre-op Exam  Pertinent negatives include no abdominal pain. Visit Vitals  /80 (BP 1 Location: Left upper arm, BP Patient Position: Sitting, BP Cuff Size: Adult)   Pulse (!) 101   Resp 18   Ht 5' 9\" (1.753 m)   Wt 193 lb (87.5 kg)   LMP 03/18/2016   BMI 28.50 kg/m²     Past Medical History:   Diagnosis Date    Adverse effect of anesthesia     hard to wake up    Anxiety     Arthritis     back    Asperger syndrome     Asthma     Chronic pain 2009    Depression     Gastroparesis     GERD (gastroesophageal reflux disease) 2019    Hypercholesterolemia     Hypertension 2017    NO MEDS    IBD (inflammatory bowel disease)     Ill-defined condition     discomfort hips shoulders knees and feet and hands MAURICIO was positive    Ill-defined condition     GASTROPORISIS    Irritable bowel disease 1995    Liver disease 2008    DIAZ    Neurological disorder     Psychiatric disorder     anxiety and depression    Stroke ([facility] [address] 2017    TIA    Thyroid disease 2019    NO MEDS     Past Surgical History:   Procedure Laterality Date    HX GI      COLONOSCOPY    HX HEENT  10/2020    Immunotherapy shots    HX HYSTERECTOMY  4/14/16    da Dotty Robotic Total Laparoscopic Hysterectomy with bilateral  salpingectomy more than 250 grams. Cystoscopy.     HX ORTHOPAEDIC      Spinal fusion, back surgery,rods and screws    HX ORTHOPAEDIC      achilles lenghtening    HX ORTHOPAEDIC      laminectomy    HX OTHER SURGICAL      tumor removed from nose as child    HX OTHER SURGICAL      dental extraction    HX OTHER SURGICAL  02/12/2021    LAPAROSCOPIC  EXCISION OF ABDOMINAL WALL MASS     Family History   Problem Relation Age of Onset    Cancer Mother         Cervical    Delayed Awakening Mother     Heart Disease Father     Depression Father     Bleeding Prob Father         Low platelets    Elevated Lipids Father     Hypertension Father     Psychiatric Disorder Father     Alzheimer's Disease Maternal Grandmother     Diabetes Maternal Grandfather     Hypertension Maternal Grandfather     Lung Disease Maternal Grandfather         COPD    Heart Surgery Paternal Grandmother     Elevated Lipids Paternal Grandmother     Heart Disease Paternal Grandmother     Hypertension Paternal Grandmother     Heart Surgery Paternal Grandfather     Elevated Lipids Paternal Grandfather     Heart Disease Paternal Grandfather     Hypertension Paternal Grandfather     Asthma Sister     Headache Sister     Asthma Sister     Cancer Sister         Skin    Hypertension Sister     Psychiatric Disorder Sister     Elevated Lipids Paternal Uncle     Heart Disease Paternal Uncle     Hypertension Paternal Uncle     Psychiatric Disorder Paternal Uncle     Elevated Lipids Paternal Uncle     Psychiatric Disorder Paternal Uncle     Heart Disease Paternal Aunt     Hypertension Paternal Aunt     Psychiatric Disorder Paternal Aunt      Outpatient Encounter Medications as of 3/21/2023   Medication Sig Dispense Refill    LORazepam (ATIVAN) 1 mg tablet TAKE 1 TAB BY MOUTH TWO (2) TIMES A DAY AS NEEDED FOR ANXIETY. DO NOT USE DAILY 30 Tablet 0    traZODone (DESYREL) 100 mg tablet TAKE 1 TABLET BY MOUTH EVERY DAY AT NIGHT 90 Tablet 0    atorvastatin (LIPITOR) 20 mg tablet Take 1 Tablet by mouth daily. 90 Tablet 1    sertraline (ZOLOFT) 100 mg tablet Take 2 Tablets by mouth daily.  180 Tablet 2    albuterol (PROVENTIL HFA, VENTOLIN HFA, PROAIR HFA) 90 mcg/actuation inhaler TAKE 1 PUFF BY INHALATION EVERY SIX HOURS AS NEEDED FOR WHEEZING. 6.7 Each 1    CETIRIZINE HCL (ZYRTEC PO) Take 10 mg by mouth two (2) times a day. [DISCONTINUED] aspirin delayed-release 81 mg tablet Take  by mouth daily. No facility-administered encounter medications on file as of 3/21/2023. Review of Systems   Constitutional:  Negative for chills and fever. Respiratory:  Negative for cough and hemoptysis. Cardiovascular: Negative. Gastrointestinal:  Negative for abdominal pain and heartburn. Musculoskeletal: Negative. Neurological: Negative. Psychiatric/Behavioral: Negative. Physical Exam  Vitals and nursing note reviewed. Cardiovascular:      Rate and Rhythm: Normal rate and regular rhythm. Pulses: Normal pulses. Pulmonary:      Effort: Pulmonary effort is normal.      Breath sounds: Normal breath sounds. Abdominal:      General: Bowel sounds are normal.      Palpations: Abdomen is soft. Musculoskeletal:         General: Normal range of motion. Cervical back: Neck supple. Skin:     General: Skin is warm. Neurological:      Mental Status: She is alert and oriented to person, place, and time. Psychiatric:         Behavior: Behavior normal.       ASSESSMENT and PLAN  Diagnoses and all orders for this visit:    1. Pre-op exam  -     METABOLIC PANEL, COMPREHENSIVE; Future  -     CBC WITH AUTOMATED DIFF; Future  -     EKG, 12 LEAD, INITIAL; Future  -     XR CHEST PA LAT; Future        -     patient is cleared for surgery pending labs, EKG and xray   2. Essential hypertension  -     EKG, 12 LEAD, INITIAL; Future    3. Mixed hyperlipidemia  -     METABOLIC PANEL, COMPREHENSIVE; Future  -     CBC WITH AUTOMATED DIFF; Future  -     EKG, 12 LEAD, INITIAL; Future    4. History of transient ischemic attack (TIA)  -     METABOLIC PANEL, COMPREHENSIVE; Future  -     CBC WITH AUTOMATED DIFF; Future    5. Irritable bowel syndrome with diarrhea  -     REFERRAL TO NUTRITION    6. Diverticulosis  -     REFERRAL TO NUTRITION        lab results and schedule of future lab studies reviewed with patient  reviewed diet, exercise and weight control  cardiovascular risk and specific lipid/LDL goals reviewed  reviewed medications and side effects in detail

## 2023-03-22 ENCOUNTER — TELEPHONE (OUTPATIENT)
Dept: INTERNAL MEDICINE CLINIC | Age: 48
End: 2023-03-22

## 2023-03-22 LAB
ALBUMIN SERPL-MCNC: 5 G/DL (ref 3.8–4.8)
ALBUMIN/GLOB SERPL: 1.8 {RATIO} (ref 1.2–2.2)
ALP SERPL-CCNC: 97 IU/L (ref 44–121)
ALT SERPL-CCNC: 51 IU/L (ref 0–32)
AST SERPL-CCNC: 26 IU/L (ref 0–40)
BASOPHILS # BLD AUTO: 0.1 X10E3/UL (ref 0–0.2)
BASOPHILS NFR BLD AUTO: 1 %
BILIRUB SERPL-MCNC: 0.9 MG/DL (ref 0–1.2)
BUN SERPL-MCNC: 11 MG/DL (ref 6–24)
BUN/CREAT SERPL: 12 (ref 9–23)
CALCIUM SERPL-MCNC: 10.9 MG/DL (ref 8.7–10.2)
CHLORIDE SERPL-SCNC: 103 MMOL/L (ref 96–106)
CO2 SERPL-SCNC: 23 MMOL/L (ref 20–29)
CREAT SERPL-MCNC: 0.91 MG/DL (ref 0.57–1)
EGFRCR SERPLBLD CKD-EPI 2021: 79 ML/MIN/1.73
EOSINOPHIL # BLD AUTO: 0.3 X10E3/UL (ref 0–0.4)
EOSINOPHIL NFR BLD AUTO: 3 %
ERYTHROCYTE [DISTWIDTH] IN BLOOD BY AUTOMATED COUNT: 12.6 % (ref 11.7–15.4)
GLOBULIN SER CALC-MCNC: 2.8 G/DL (ref 1.5–4.5)
GLUCOSE SERPL-MCNC: 116 MG/DL (ref 70–99)
HCT VFR BLD AUTO: 48.8 % (ref 34–46.6)
HGB BLD-MCNC: 16.1 G/DL (ref 11.1–15.9)
IMM GRANULOCYTES # BLD AUTO: 0 X10E3/UL (ref 0–0.1)
IMM GRANULOCYTES NFR BLD AUTO: 0 %
LYMPHOCYTES # BLD AUTO: 2.3 X10E3/UL (ref 0.7–3.1)
LYMPHOCYTES NFR BLD AUTO: 26 %
MCH RBC QN AUTO: 28.2 PG (ref 26.6–33)
MCHC RBC AUTO-ENTMCNC: 33 G/DL (ref 31.5–35.7)
MCV RBC AUTO: 86 FL (ref 79–97)
MONOCYTES # BLD AUTO: 0.7 X10E3/UL (ref 0.1–0.9)
MONOCYTES NFR BLD AUTO: 8 %
NEUTROPHILS # BLD AUTO: 5.6 X10E3/UL (ref 1.4–7)
NEUTROPHILS NFR BLD AUTO: 62 %
PLATELET # BLD AUTO: 239 X10E3/UL (ref 150–450)
POTASSIUM SERPL-SCNC: 5.2 MMOL/L (ref 3.5–5.2)
PROT SERPL-MCNC: 7.8 G/DL (ref 6–8.5)
RBC # BLD AUTO: 5.7 X10E6/UL (ref 3.77–5.28)
SODIUM SERPL-SCNC: 141 MMOL/L (ref 134–144)
WBC # BLD AUTO: 8.9 X10E3/UL (ref 3.4–10.8)

## 2023-03-22 NOTE — PROGRESS NOTES
Liver levels still remain high. Make sure she is not intaking alcohol or tylenol use often.  May need to do an US to check for fatty liver   CBC stable

## 2023-03-22 NOTE — TELEPHONE ENCOUNTER
----- Message from Orquidea Martinez NP sent at 3/22/2023  8:36 AM EDT -----  Liver levels still remain high. Make sure she is not intaking alcohol or tylenol use often.  May need to do an US to check for fatty liver   CBC stable

## 2023-03-23 LAB
ATRIAL RATE: 80 BPM
CALCULATED P AXIS, ECG09: 17 DEGREES
CALCULATED R AXIS, ECG10: 3 DEGREES
CALCULATED T AXIS, ECG11: 33 DEGREES
DIAGNOSIS, 93000: NORMAL
P-R INTERVAL, ECG05: 142 MS
Q-T INTERVAL, ECG07: 370 MS
QRS DURATION, ECG06: 84 MS
QTC CALCULATION (BEZET), ECG08: 426 MS
VENTRICULAR RATE, ECG03: 80 BPM

## 2023-06-03 DIAGNOSIS — E78.5 HYPERLIPIDEMIA, UNSPECIFIED: ICD-10-CM

## 2023-06-05 RX ORDER — ATORVASTATIN CALCIUM 20 MG/1
TABLET, FILM COATED ORAL
Qty: 90 TABLET | Refills: 1 | Status: SHIPPED | OUTPATIENT
Start: 2023-06-05

## 2023-07-08 DIAGNOSIS — G47.00 INSOMNIA, UNSPECIFIED: ICD-10-CM

## 2023-07-10 RX ORDER — TRAZODONE HYDROCHLORIDE 100 MG/1
TABLET ORAL
Qty: 90 TABLET | Refills: 1 | Status: SHIPPED | OUTPATIENT
Start: 2023-07-10

## 2023-10-27 DIAGNOSIS — G47.00 INSOMNIA, UNSPECIFIED: ICD-10-CM

## 2023-10-27 DIAGNOSIS — E78.5 HYPERLIPIDEMIA, UNSPECIFIED: ICD-10-CM

## 2023-10-27 RX ORDER — TRAZODONE HYDROCHLORIDE 100 MG/1
TABLET ORAL
Qty: 90 TABLET | Refills: 1 | Status: SHIPPED | OUTPATIENT
Start: 2023-10-27

## 2023-10-27 RX ORDER — ATORVASTATIN CALCIUM 20 MG/1
TABLET, FILM COATED ORAL
Qty: 90 TABLET | Refills: 1 | Status: SHIPPED | OUTPATIENT
Start: 2023-10-27

## 2023-11-12 DIAGNOSIS — F43.10 POST-TRAUMATIC STRESS DISORDER, UNSPECIFIED: ICD-10-CM

## 2023-11-13 RX ORDER — SERTRALINE HYDROCHLORIDE 100 MG/1
200 TABLET, FILM COATED ORAL DAILY
Qty: 180 TABLET | Refills: 2 | Status: SHIPPED | OUTPATIENT
Start: 2023-11-13

## 2023-11-19 ENCOUNTER — OFFICE VISIT (OUTPATIENT)
Age: 48
End: 2023-11-19

## 2023-11-19 VITALS
OXYGEN SATURATION: 98 % | BODY MASS INDEX: 29.33 KG/M2 | HEART RATE: 94 BPM | SYSTOLIC BLOOD PRESSURE: 120 MMHG | DIASTOLIC BLOOD PRESSURE: 81 MMHG | WEIGHT: 198 LBS | RESPIRATION RATE: 18 BRPM | HEIGHT: 69 IN | TEMPERATURE: 97.5 F

## 2023-11-19 DIAGNOSIS — L23.7 POISON IVY: Primary | ICD-10-CM

## 2023-11-19 RX ORDER — PREDNISONE 10 MG/1
TABLET ORAL
Qty: 30 TABLET | Refills: 0 | Status: SHIPPED | OUTPATIENT
Start: 2023-11-19 | End: 2023-12-01

## 2023-12-08 DIAGNOSIS — F41.9 ANXIETY DISORDER, UNSPECIFIED: ICD-10-CM

## 2023-12-08 RX ORDER — LORAZEPAM 1 MG/1
TABLET ORAL
Qty: 20 TABLET | Refills: 0 | Status: SHIPPED | OUTPATIENT
Start: 2023-12-08 | End: 2023-12-18

## 2023-12-24 NOTE — PROGRESS NOTES
Ms. Selwyn Cote is back to review test results and decide about indications for management. She is here with her  again. Echo:    IMPRESSIONS:  Mild LVH with mild diastolic impairment. No clear substrate for  cardiogenic syncope. SUMMARY:  Left ventricle: The posteromedial papillary muscle is thickened with  turbulence in mid ventricle, but it was not possible to demonstrate a  gradient. The cavity was small. Systolic function was vigorous by visual  assessment. Ejection fraction was estimated to be 70 %. There were no  regional wall motion abnormalities. Wall thickness was moderately  increased. There was moderate concentric hypertrophy. There was mild  asymmetric hypertrophy of the papillary muscles. Features were consistent  with a pseudonormal left ventricular filling pattern, with concomitant  abnormal relaxation and increased filling pressure (grade 2 diastolic  Dysfunction). Holter:    Holter monitor recording provided 49 hours one minute of readable data. No   symptoms are reported in the diary. Tracing quality is adequate with some   baseline artifact. Base rhythm is sinus with normal NM interval. There are   extremely rare unifocal VPC's. Rate variations are physiologic and occur   without significant ST segment changes. There are no other arrhythmias noted.     The echocardiogram demonstrates modest left ventricular hypertrophy with moderate diastolic impairment. The problem is not severe in stroke volume remains adequate barring untoward tachycardia. No arrhythmias are noted but she also did not have symptoms during the study. She has not had any further episodes of the severity that led to the initial consultation since the last time she was here. On examination today, she is in no distress. She is 5 feet 9 inches tall, she weighs 182 pounds. Room air oxygen saturation is 98%. Resting pulse is 78/min and regular. Blood pressure is 113/82.   Lungs are clear, neck vessels are normal, cardiac auscultation is normal.  There is no gallop. Peripheral pulses are intact, there is no renal bruit, there is no edema, there is no sign of DVT. As previously noted there is no cyanosis or clubbing. Impression: Essential hypertension    Mild or early hypertensive heart disease with diminished left ventricular diastolic compliance, intact systolic function    Episodic near syncope that appears for the most part to be postural    There is no anatomic substrate for syncope and although no symptoms were captured, no arrhythmias were demonstrated on Holter. Plan: We provided the patient with printed information regarding the DASH lifestyle and diet. With explained the RAAAS system and its potential to be provoked by thiazide diuretics as hypertensive treatment    In the interests of  curtailing further diastolic stiffening of the left ventricle, we discontinued hydrochlorothiazide     To the extent that this drug can contracted plasma volume it might make her more prone to postural syncope     The potential of HCTZ to trigger angiotensin and worsening hypertrophy is probably important at this early stage of hypertensive heart disease    If her blood pressure rises according to self-monitoring we can adjust her medications without restarting the diuretic.     Ms. Maddie Huerta can return to her primary physician at this point but she is welcome to come back or call if there are new problems or difficulties There are no Wet Read(s) to document.

## 2024-01-09 ENCOUNTER — TRANSCRIBE ORDERS (OUTPATIENT)
Facility: HOSPITAL | Age: 49
End: 2024-01-09

## 2024-01-09 DIAGNOSIS — Z12.31 VISIT FOR SCREENING MAMMOGRAM: Primary | ICD-10-CM

## 2024-01-12 ENCOUNTER — HOSPITAL ENCOUNTER (OUTPATIENT)
Facility: HOSPITAL | Age: 49
Discharge: HOME | End: 2024-01-12
Payer: COMMERCIAL

## 2024-01-12 VITALS — WEIGHT: 190 LBS | HEIGHT: 69 IN | BODY MASS INDEX: 28.14 KG/M2

## 2024-01-12 DIAGNOSIS — Z12.31 VISIT FOR SCREENING MAMMOGRAM: ICD-10-CM

## 2024-01-12 PROCEDURE — 77063 BREAST TOMOSYNTHESIS BI: CPT

## 2024-04-23 DIAGNOSIS — G47.00 INSOMNIA, UNSPECIFIED: ICD-10-CM

## 2024-04-23 DIAGNOSIS — E78.5 HYPERLIPIDEMIA, UNSPECIFIED: ICD-10-CM

## 2024-04-23 RX ORDER — ATORVASTATIN CALCIUM 20 MG/1
TABLET, FILM COATED ORAL
Qty: 90 TABLET | Refills: 1 | OUTPATIENT
Start: 2024-04-23

## 2024-04-23 RX ORDER — TRAZODONE HYDROCHLORIDE 100 MG/1
TABLET ORAL
Qty: 90 TABLET | Refills: 1 | OUTPATIENT
Start: 2024-04-23

## 2025-02-12 ENCOUNTER — TRANSCRIBE ORDERS (OUTPATIENT)
Facility: HOSPITAL | Age: 50
End: 2025-02-12

## 2025-02-12 DIAGNOSIS — R91.1 LUNG NODULE: Primary | ICD-10-CM

## 2025-03-07 ENCOUNTER — HOSPITAL ENCOUNTER (OUTPATIENT)
Facility: HOSPITAL | Age: 50
Discharge: HOME | End: 2025-03-10
Payer: COMMERCIAL

## 2025-03-07 VITALS — HEIGHT: 69 IN | WEIGHT: 190 LBS | BODY MASS INDEX: 28.14 KG/M2

## 2025-03-07 DIAGNOSIS — R91.1 LUNG NODULE: ICD-10-CM

## 2025-03-07 DIAGNOSIS — Z12.31 SCREENING MAMMOGRAM FOR BREAST CANCER: ICD-10-CM

## 2025-03-07 PROCEDURE — 77067 SCR MAMMO BI INCL CAD: CPT

## 2025-03-07 PROCEDURE — 77063 BREAST TOMOSYNTHESIS BI: CPT

## 2025-03-07 PROCEDURE — 71250 CT THORAX DX C-: CPT

## 2025-06-05 ENCOUNTER — TRANSCRIBE ORDERS (OUTPATIENT)
Facility: HOSPITAL | Age: 50
End: 2025-06-05

## 2025-06-05 DIAGNOSIS — M77.42 METATARSALGIA OF LEFT FOOT: ICD-10-CM

## 2025-06-05 DIAGNOSIS — M20.62 ACQUIRED DEFORMITY OF LEFT TOE: Primary | ICD-10-CM

## 2025-06-25 ENCOUNTER — HOSPITAL ENCOUNTER (OUTPATIENT)
Facility: HOSPITAL | Age: 50
Discharge: HOME | End: 2025-06-28
Attending: PODIATRIST
Payer: COMMERCIAL

## 2025-06-25 DIAGNOSIS — M20.62 ACQUIRED DEFORMITY OF LEFT TOE: ICD-10-CM

## 2025-06-25 DIAGNOSIS — M77.42 METATARSALGIA OF LEFT FOOT: ICD-10-CM

## 2025-06-25 PROCEDURE — 73718 MRI LOWER EXTREMITY W/O DYE: CPT

## (undated) DEVICE — SURGICAL PROCEDURE KIT GEN LAPAROSCOPY LF

## (undated) DEVICE — TISSUE RETRIEVAL SYSTEM: Brand: INZII RETRIEVAL SYSTEM

## (undated) DEVICE — GARMENT,MEDLINE,DVT,INT,CALF,MED, GEN2: Brand: MEDLINE

## (undated) DEVICE — 4-PORT MANIFOLD: Brand: NEPTUNE 2

## (undated) DEVICE — STERILE POLYISOPRENE POWDER-FREE SURGICAL GLOVES WITH EMOLLIENT COATING: Brand: PROTEXIS

## (undated) DEVICE — LAPAROSCOPIC TROCAR SLEEVE/SINGLE USE: Brand: KII® OPTICAL ACCESS SYSTEM

## (undated) DEVICE — TROCAR: Brand: KII® SLEEVE

## (undated) DEVICE — TUBING, SUCTION, 1/4" X 12', STRAIGHT: Brand: MEDLINE

## (undated) DEVICE — DERMABOND SKIN ADH 0.7ML -- DERMABOND ADVANCED 12/BX

## (undated) DEVICE — STRAP,POSITIONING,KNEE/BODY,FOAM,4X60": Brand: MEDLINE

## (undated) DEVICE — NEEDLE SPNL 22GA L3.5IN BLK HUB S STL REG WALL FIT STYL W/

## (undated) DEVICE — REM POLYHESIVE ADULT PATIENT RETURN ELECTRODE: Brand: VALLEYLAB

## (undated) DEVICE — SCISSORS ENDOSCP DIA5MM CRV MPLR CAUT W/ RATCH HNDL

## (undated) DEVICE — FILTER SMK EVAC FLO CLR MEGADYNE

## (undated) DEVICE — SUTURE PDS II SZ 0 L27IN ABSRB VLT L26MM CT-2 1/2 CIR Z334H

## (undated) DEVICE — SUTURE MCRYL SZ 4-0 L27IN ABSRB UD L19MM PS-2 1/2 CIR PRIM Y426H

## (undated) DEVICE — INFECTION CONTROL KIT SYS

## (undated) DEVICE — PREP SKN CHLRAPRP APL 26ML STR --

## (undated) DEVICE — DRAPE,UTILTY,TAPE,15X26, 4EA/PK: Brand: MEDLINE